# Patient Record
Sex: FEMALE | Race: OTHER | Employment: FULL TIME | URBAN - METROPOLITAN AREA
[De-identification: names, ages, dates, MRNs, and addresses within clinical notes are randomized per-mention and may not be internally consistent; named-entity substitution may affect disease eponyms.]

---

## 2017-01-23 RX ORDER — ESOMEPRAZOLE MAGNESIUM 40 MG/1
CAPSULE, DELAYED RELEASE ORAL
Qty: 30 CAPSULE | Refills: 2 | Status: SHIPPED | OUTPATIENT
Start: 2017-01-23

## 2017-11-30 DIAGNOSIS — I10 HTN (HYPERTENSION), BENIGN: ICD-10-CM

## 2017-11-30 RX ORDER — LISINOPRIL 20 MG/1
TABLET ORAL
Qty: 30 TABLET | Refills: 0 | Status: SHIPPED | OUTPATIENT
Start: 2017-11-30 | End: 2018-03-01 | Stop reason: SDUPTHER

## 2018-03-01 ENCOUNTER — TELEPHONE (OUTPATIENT)
Dept: INTERNAL MEDICINE CLINIC | Age: 58
End: 2018-03-01

## 2018-03-01 DIAGNOSIS — I10 HTN (HYPERTENSION), BENIGN: ICD-10-CM

## 2018-03-01 RX ORDER — LISINOPRIL 20 MG/1
20 TABLET ORAL DAILY
Qty: 30 TABLET | Refills: 0 | Status: SHIPPED | OUTPATIENT
Start: 2018-03-01 | End: 2018-03-12 | Stop reason: SDUPTHER

## 2018-03-12 ENCOUNTER — OFFICE VISIT (OUTPATIENT)
Dept: INTERNAL MEDICINE CLINIC | Age: 58
End: 2018-03-12
Payer: COMMERCIAL

## 2018-03-12 VITALS
HEIGHT: 62 IN | TEMPERATURE: 98.9 F | WEIGHT: 184.6 LBS | DIASTOLIC BLOOD PRESSURE: 80 MMHG | RESPIRATION RATE: 16 BRPM | HEART RATE: 76 BPM | SYSTOLIC BLOOD PRESSURE: 138 MMHG | OXYGEN SATURATION: 99 % | BODY MASS INDEX: 33.97 KG/M2

## 2018-03-12 DIAGNOSIS — I10 HTN (HYPERTENSION), BENIGN: Primary | ICD-10-CM

## 2018-03-12 DIAGNOSIS — L21.9 SEBORRHEIC DERMATITIS: ICD-10-CM

## 2018-03-12 DIAGNOSIS — Z12.11 SCREENING FOR COLON CANCER: ICD-10-CM

## 2018-03-12 DIAGNOSIS — M32.19 OTHER SYSTEMIC LUPUS ERYTHEMATOSUS WITH OTHER ORGAN INVOLVEMENT (HCC): ICD-10-CM

## 2018-03-12 DIAGNOSIS — Z12.31 VISIT FOR SCREENING MAMMOGRAM: ICD-10-CM

## 2018-03-12 PROCEDURE — 99214 OFFICE O/P EST MOD 30 MIN: CPT | Performed by: FAMILY MEDICINE

## 2018-03-12 RX ORDER — GABAPENTIN 300 MG/1
2 CAPSULE ORAL EVERY EVENING
COMMUNITY
Start: 2018-02-02 | End: 2018-12-02

## 2018-03-12 RX ORDER — LISINOPRIL 20 MG/1
20 TABLET ORAL DAILY
Qty: 90 TABLET | Refills: 3 | Status: SHIPPED | OUTPATIENT
Start: 2018-03-12

## 2018-03-12 RX ORDER — IBUPROFEN 800 MG/1
800 TABLET ORAL EVERY 8 HOURS PRN
COMMUNITY
Start: 2018-02-09

## 2018-03-12 ASSESSMENT — PATIENT HEALTH QUESTIONNAIRE - PHQ9
SUM OF ALL RESPONSES TO PHQ QUESTIONS 1-9: 1
SUM OF ALL RESPONSES TO PHQ9 QUESTIONS 1 & 2: 1
2. FEELING DOWN, DEPRESSED OR HOPELESS: 1
1. LITTLE INTEREST OR PLEASURE IN DOING THINGS: 0

## 2018-03-12 NOTE — PATIENT INSTRUCTIONS
Patient Education        Seborrheic Dermatitis: Care Instructions  Your Care Instructions  Seborrheic dermatitis (say \"vpy-tcu-KHW-ick ahm-spz-AE-tus\") is a skin problem that causes a reddish rash with greasy, flaky, yellow skin patches. The rash may appear on many parts of the body. It may be on the scalp, face (especially the eyebrow area and between the nose and mouth), ears, breasts, underarms, and genital area. The flaky skin on the scalp is called dandruff. This rash is often a long-term (chronic) condition. It may last for years. But the symptoms may come and go. Symptoms can be treated with special creams, shampoos, or other skin care. The cause of seborrheic dermatitis is not fully understood. It may occur when skin glands make too much oil. It may get worse in cold weather or with stress. A type of skin fungus, or yeast, may also be linked with this condition. Follow-up care is a key part of your treatment and safety. Be sure to make and go to all appointments, and call your doctor if you are having problems. It's also a good idea to know your test results and keep a list of the medicines you take. How can you care for yourself at home? · If your doctor prescribes a steroid cream, dandruff shampoo, or antifungal cream or medicine, use it as directed. If your doctor prescribes other medicine, take it as directed. · Use a dandruff shampoo if seborrheic dermatitis affects your scalp. This includes Head & Shoulders, Sebulex, and Selsun Blue. You may need to try a few kinds of shampoo to find the one that works best for you. · To help with itching:  ¨ Use hydrocortisone cream. Follow the directions on the label. ¨ Use cold, wet cloths. ¨ Take an over-the-counter antihistamine, such as diphenhydramine (Benadryl) or loratadine (Claritin). Read and follow all instructions on the label. When should you call for help?   Call your doctor now or seek immediate medical care if:  ? · You have signs of

## 2018-03-12 NOTE — PROGRESS NOTES
Patient: Ryan Santoyo    YOB: 1960    Date: 3/12/18    Chief Complaint   Patient presents with    Hypertension     6 month follow up. She states she feels like someting is crawling sensation in her right ear.  Health Maintenance     She declines flu, tdap vaccine. Patient Active Problem List    Diagnosis Date Noted    Seborrheic dermatitis 03/12/2018    Fibromyalgia     Raynaud phenomenon     Reflux esophagitis     HTN (hypertension), benign     Systemic lupus erythematosus (HCC)        Allergies   Allergen Reactions    Sulfa Antibiotics Hives    Sulfa Antibiotics        Vitals:    03/12/18 1740   BP: 138/80   Site: Right Arm   Position: Sitting   Cuff Size: Large Adult   Pulse: 76   Resp: 16   Temp: 98.9 °F (37.2 °C)   TempSrc: Oral   SpO2: 99%   Weight: 184 lb 9.6 oz (83.7 kg)   Height: 5' 2\" (1.575 m)      Body mass index is 33.76 kg/m². HPI    She is here to follow-up on her blood pressure and her reflux. She now has fibromyalgia and of course is been dealing with her systemic lupus. She's also been told she has plantar fasciitis as well as what sounds like a neuroma on her foot. She has no headache no cough no fever no chills no nausea no vomiting no problems with her bowels or bladder. We'll write her another mammogram ordered. She feels occasionally like there's something crawling in her ears but she was told she had a skin irritation of her ears in the past but she's just fearful that there is something in her ear canal.  No trouble with hearing. Review of Systems    Constitutional: Negative for fatigue, fever and positive for sweats. HEENT: Negative for eye discharge and vision loss. Negative for ear drainage, hearing loss and nasal drainage. Respiratory: Negative for cough, dyspnea and wheezing. Cardiovascular:  Negative for chest pain, claudication and irregular heartbeat/palpitations.   Gastrointestinal: Negative for abdominal pain, nausea, constipation [facility] DIGITAL SCREEN W OR WO CAD BILATERAL               Plan:  Current Outpatient Prescriptions   Medication Sig Dispense Refill    gabapentin (NEURONTIN) 300 MG capsule Take 2 capsules by mouth every evening.  ibuprofen (ADVIL;MOTRIN) 800 MG tablet Take 800 mg by mouth every 8 hours as needed      lisinopril (PRINIVIL;ZESTRIL) 20 MG tablet Take 1 tablet by mouth daily 90 tablet 3    esomeprazole (NEXIUM) 40 MG delayed release capsule take 1 capsule by mouth once daily 30 capsule 2    methotrexate 2.5 MG tablet       hydroxychloroquine (PLAQUENIL) 200 MG tablet Take 200 mg by mouth 2 times daily.  folic acid (FOLVITE) 1 MG tablet Take 1 mg by mouth daily.  Cholecalciferol (VITAMIN D3) 1000 UNITS TABS Take 4,000 Units by mouth daily        No current facility-administered medications for this visit. Orders Placed This Encounter   Procedures    [facility] DIGITAL SCREEN W OR WO CAD BILATERAL     Standing Status:   Future     Standing Expiration Date:   5/12/2019    Comprehensive Metabolic Panel     Standing Status:   Future     Standing Expiration Date:   3/12/2019    POCT Fecal Immunochemical Test (FIT)     Standing Status:   Future     Standing Expiration Date:   3/12/2019       Orders Placed This Encounter   Medications    lisinopril (PRINIVIL;ZESTRIL) 20 MG tablet     Sig: Take 1 tablet by mouth daily     Dispense:  90 tablet     Refill:  3             Return in about 6 months (around 9/12/2018).     Dr. Kye Liagn      3/12/18  6:04 PM

## 2018-03-27 ENCOUNTER — NURSE ONLY (OUTPATIENT)
Dept: INTERNAL MEDICINE CLINIC | Age: 58
End: 2018-03-27
Payer: COMMERCIAL

## 2018-03-27 DIAGNOSIS — I10 HTN (HYPERTENSION), BENIGN: Primary | ICD-10-CM

## 2018-03-27 DIAGNOSIS — Z12.11 SCREENING FOR COLON CANCER: ICD-10-CM

## 2018-03-27 PROCEDURE — 82274 ASSAY TEST FOR BLOOD FECAL: CPT | Performed by: FAMILY MEDICINE

## 2018-03-27 PROCEDURE — 36415 COLL VENOUS BLD VENIPUNCTURE: CPT | Performed by: FAMILY MEDICINE

## 2018-03-28 LAB
ALBUMIN SERPL-MCNC: 4.3 G/DL (ref 3.9–4.9)
ALP BLD-CCNC: 169 U/L (ref 40–130)
ALT SERPL-CCNC: 60 U/L (ref 0–33)
ANION GAP SERPL CALCULATED.3IONS-SCNC: 14 MEQ/L (ref 7–13)
AST SERPL-CCNC: 76 U/L (ref 0–35)
BILIRUB SERPL-MCNC: 0.4 MG/DL (ref 0–1.2)
BUN BLDV-MCNC: 9 MG/DL (ref 6–20)
CALCIUM SERPL-MCNC: 8.9 MG/DL (ref 8.6–10.2)
CHLORIDE BLD-SCNC: 101 MEQ/L (ref 98–107)
CO2: 27 MEQ/L (ref 22–29)
CREAT SERPL-MCNC: 0.69 MG/DL (ref 0.5–0.9)
GFR AFRICAN AMERICAN: >60
GFR NON-AFRICAN AMERICAN: >60
GLOBULIN: 2.6 G/DL (ref 2.3–3.5)
GLUCOSE BLD-MCNC: 76 MG/DL (ref 74–109)
POTASSIUM SERPL-SCNC: 4 MEQ/L (ref 3.5–5.1)
SODIUM BLD-SCNC: 142 MEQ/L (ref 132–144)
TOTAL PROTEIN: 6.9 G/DL (ref 6.4–8.1)

## 2018-04-03 LAB
CONTROL: NORMAL
HEMOCCULT STL QL: NORMAL

## 2018-04-14 ENCOUNTER — HOSPITAL ENCOUNTER (OUTPATIENT)
Dept: WOMENS IMAGING | Age: 58
Discharge: HOME | End: 2018-04-16
Payer: COMMERCIAL

## 2018-04-14 DIAGNOSIS — Z12.31 VISIT FOR SCREENING MAMMOGRAM: ICD-10-CM

## 2018-04-14 PROCEDURE — 77067 SCR MAMMO BI INCL CAD: CPT

## 2018-12-20 ENCOUNTER — TELEPHONE (OUTPATIENT)
Dept: INTERNAL MEDICINE CLINIC | Age: 58
End: 2018-12-20

## 2018-12-20 NOTE — TELEPHONE ENCOUNTER
[address] called back and states the lab work was ordered by Dr. Marvel Dos Santos, and patient just wanted you to know about them.

## 2020-04-21 ENCOUNTER — TELEPHONE (OUTPATIENT)
Dept: FAMILY MEDICINE CLINIC | Age: 60
End: 2020-04-21

## 2020-04-28 ENCOUNTER — TELEPHONE (OUTPATIENT)
Dept: FAMILY MEDICINE CLINIC | Age: 60
End: 2020-04-28

## 2023-04-07 ENCOUNTER — OFFICE VISIT (OUTPATIENT)
Dept: PRIMARY CARE | Facility: CLINIC | Age: 63
End: 2023-04-07
Payer: COMMERCIAL

## 2023-04-07 VITALS
BODY MASS INDEX: 36.78 KG/M2 | SYSTOLIC BLOOD PRESSURE: 122 MMHG | TEMPERATURE: 97.9 F | HEIGHT: 61 IN | RESPIRATION RATE: 16 BRPM | DIASTOLIC BLOOD PRESSURE: 72 MMHG | WEIGHT: 194.8 LBS

## 2023-04-07 DIAGNOSIS — K21.9 GASTROESOPHAGEAL REFLUX DISEASE WITHOUT ESOPHAGITIS: Primary | ICD-10-CM

## 2023-04-07 DIAGNOSIS — L91.8 SKIN TAG: ICD-10-CM

## 2023-04-07 DIAGNOSIS — R73.03 PREDIABETES: ICD-10-CM

## 2023-04-07 PROBLEM — H25.13 AGE-RELATED NUCLEAR CATARACT OF BOTH EYES: Status: ACTIVE | Noted: 2023-04-07

## 2023-04-07 PROBLEM — R51.9 HEADACHE: Status: ACTIVE | Noted: 2023-04-07

## 2023-04-07 PROBLEM — B36.0 TINEA VERSICOLOR: Status: ACTIVE | Noted: 2023-04-07

## 2023-04-07 PROBLEM — I10 HYPERTENSION: Status: ACTIVE | Noted: 2023-04-07

## 2023-04-07 PROBLEM — M79.673 FOOT PAIN: Status: ACTIVE | Noted: 2023-04-07

## 2023-04-07 PROBLEM — R10.11 ABDOMINAL PAIN, ACUTE, RIGHT UPPER QUADRANT: Status: ACTIVE | Noted: 2023-04-07

## 2023-04-07 PROBLEM — D69.6 THROMBOCYTOPENIA (CMS-HCC): Status: ACTIVE | Noted: 2023-04-07

## 2023-04-07 PROBLEM — T16.2XXA FOREIGN BODY OF LEFT EAR: Status: ACTIVE | Noted: 2023-04-07

## 2023-04-07 PROBLEM — H18.413 ARCUS SENILIS, BILATERAL: Status: ACTIVE | Noted: 2023-04-07

## 2023-04-07 PROBLEM — E66.812 CLASS 2 SEVERE OBESITY DUE TO EXCESS CALORIES WITH SERIOUS COMORBIDITY AND BODY MASS INDEX (BMI) OF 37.0 TO 37.9 IN ADULT: Status: ACTIVE | Noted: 2023-04-07

## 2023-04-07 PROBLEM — E55.9 VITAMIN D DEFICIENCY: Status: RESOLVED | Noted: 2023-04-07 | Resolved: 2023-04-07

## 2023-04-07 PROBLEM — E66.01 CLASS 2 SEVERE OBESITY DUE TO EXCESS CALORIES WITH SERIOUS COMORBIDITY AND BODY MASS INDEX (BMI) OF 37.0 TO 37.9 IN ADULT (MULTI): Status: ACTIVE | Noted: 2023-04-07

## 2023-04-07 PROBLEM — M94.0 COSTOCHONDRITIS: Status: ACTIVE | Noted: 2023-04-07

## 2023-04-07 PROBLEM — H25.042 POSTERIOR SUBCAPSULAR AGE-RELATED CATARACT OF LEFT EYE: Status: ACTIVE | Noted: 2023-04-07

## 2023-04-07 PROBLEM — M25.569 KNEE PAIN: Status: ACTIVE | Noted: 2023-04-07

## 2023-04-07 PROBLEM — R74.8 ALKALINE PHOSPHATASE ELEVATION: Status: ACTIVE | Noted: 2023-04-07

## 2023-04-07 PROBLEM — I83.90 VARICOSE VEINS OF LOWER EXTREMITY: Status: ACTIVE | Noted: 2023-04-07

## 2023-04-07 PROBLEM — K13.0 ANGULAR CHEILOSIS: Status: ACTIVE | Noted: 2023-04-07

## 2023-04-07 PROBLEM — M79.7 FIBROMYALGIA: Status: ACTIVE | Noted: 2023-04-07

## 2023-04-07 PROBLEM — T78.40XA ALLERGIC REACTION: Status: ACTIVE | Noted: 2023-04-07

## 2023-04-07 PROBLEM — S83.90XA KNEE SPRAIN: Status: ACTIVE | Noted: 2023-04-07

## 2023-04-07 PROBLEM — I83.90 VARICOSE VEIN OF LEG: Status: ACTIVE | Noted: 2023-04-07

## 2023-04-07 PROBLEM — M32.9 SLE (SYSTEMIC LUPUS ERYTHEMATOSUS) (MULTI): Status: ACTIVE | Noted: 2023-04-07

## 2023-04-07 PROBLEM — K13.79 SORE IN MOUTH: Status: ACTIVE | Noted: 2023-04-07

## 2023-04-07 PROBLEM — M23.90 KNEE INTERNAL DERANGEMENT: Status: ACTIVE | Noted: 2023-04-07

## 2023-04-07 PROBLEM — R74.01 TRANSAMINITIS: Status: ACTIVE | Noted: 2023-04-07

## 2023-04-07 PROBLEM — S93.609A FOOT SPRAIN: Status: ACTIVE | Noted: 2023-04-07

## 2023-04-07 PROCEDURE — 3078F DIAST BP <80 MM HG: CPT | Performed by: PHYSICIAN ASSISTANT

## 2023-04-07 PROCEDURE — 1036F TOBACCO NON-USER: CPT | Performed by: PHYSICIAN ASSISTANT

## 2023-04-07 PROCEDURE — 3074F SYST BP LT 130 MM HG: CPT | Performed by: PHYSICIAN ASSISTANT

## 2023-04-07 PROCEDURE — 99213 OFFICE O/P EST LOW 20 MIN: CPT | Performed by: PHYSICIAN ASSISTANT

## 2023-04-07 RX ORDER — ESOMEPRAZOLE MAGNESIUM 40 MG/1
40 CAPSULE, DELAYED RELEASE ORAL DAILY
Qty: 90 CAPSULE | Refills: 1 | Status: SHIPPED | OUTPATIENT
Start: 2023-04-07 | End: 2023-10-09 | Stop reason: SDUPTHER

## 2023-04-07 RX ORDER — HYDROXYCHLOROQUINE SULFATE 200 MG/1
200 TABLET ORAL 2 TIMES DAILY
COMMUNITY

## 2023-04-07 RX ORDER — METFORMIN HYDROCHLORIDE 500 MG/1
500 TABLET ORAL DAILY
COMMUNITY
Start: 2021-09-10 | End: 2023-05-01 | Stop reason: SDUPTHER

## 2023-04-07 RX ORDER — CHOLECALCIFEROL (VITAMIN D3) 1250 MCG
50000 TABLET ORAL
COMMUNITY
Start: 2021-09-10 | End: 2023-10-09 | Stop reason: ALTCHOICE

## 2023-04-07 RX ORDER — ESOMEPRAZOLE MAGNESIUM 40 MG/1
40 CAPSULE, DELAYED RELEASE ORAL DAILY
COMMUNITY
End: 2023-04-07 | Stop reason: SDUPTHER

## 2023-04-07 RX ORDER — AZATHIOPRINE 50 MG/1
25 TABLET ORAL DAILY
COMMUNITY
End: 2023-10-09 | Stop reason: ALTCHOICE

## 2023-04-07 RX ORDER — DULOXETIN HYDROCHLORIDE 60 MG/1
1 CAPSULE, DELAYED RELEASE ORAL DAILY
COMMUNITY
Start: 2022-10-20 | End: 2023-10-09 | Stop reason: SDUPTHER

## 2023-04-07 RX ORDER — PREGABALIN 150 MG/1
150 CAPSULE ORAL 3 TIMES DAILY PRN
COMMUNITY

## 2023-04-07 RX ORDER — TRIAMCINOLONE ACETONIDE 5 MG/G
CREAM TOPICAL
COMMUNITY
Start: 2022-10-20

## 2023-04-07 RX ORDER — LISINOPRIL 10 MG/1
10 TABLET ORAL DAILY
COMMUNITY
End: 2023-10-09 | Stop reason: SDUPTHER

## 2023-04-07 RX ORDER — BELIMUMAB 200 MG/ML
1 SOLUTION SUBCUTANEOUS
COMMUNITY

## 2023-04-07 NOTE — PROGRESS NOTES
"Subjective   Patient ID: Daylin Tyler is a 62 y.o. female who presents for Skin Tag (Patient would like to have skin tags removed from both eyes. She has one on her top right eyelid and another bottom left eye. ) and Med Refill.    HPI   Right upper eyelid skin lesion   Left lower eyelid skin lesion     Review of Systems   Skin:         Skin lesions right upper eyelid, left lower eyelid        Objective   /72 (BP Location: Right arm, Patient Position: Sitting)   Temp 36.6 °C (97.9 °F)   Resp 16   Ht 1.549 m (5' 1\")   Wt 88.4 kg (194 lb 12.8 oz)   BMI 36.81 kg/m²     Physical Exam  Skin:     Findings: Lesion (right upper eyelid skin lesion. Left lower eyelid skin tag) present.         PROCEDURE   - snip leftlower eyelid  - cryo right upper eyelid   Patient ID: Daylin Tyler is a 62 y.o. female.    Destruction of lesion    Date/Time: 4/9/2023 9:50 PM    Performed by: Regina Romo PA-C  Authorized by: Regina Romo PA-C    Number of Lesions: 2  Lesion 1:     Body area: head/neck    Head/neck location: right upper eyelid.    Malignancy: benign lesion      Destruction method: cryotherapy    Lesion 2:     Body area: head/neck    Head/neck location: left lower eyelid.    Malignancy: benign lesion      Destruction method: scissors used for extraction        Assessment/Plan   Problem List Items Addressed This Visit          Digestive    GERD (gastroesophageal reflux disease) - Primary    Relevant Medications    esomeprazole (NexIUM) 40 mg DR capsule       Endocrine/Metabolic    Prediabetes    Relevant Orders    Lipid Panel    CBC    Hemoglobin A1C          "

## 2023-04-22 ENCOUNTER — LAB (OUTPATIENT)
Dept: LAB | Facility: LAB | Age: 63
End: 2023-04-22
Payer: COMMERCIAL

## 2023-04-22 DIAGNOSIS — R73.03 PREDIABETES: ICD-10-CM

## 2023-04-22 LAB
CHOLESTEROL (MG/DL) IN SER/PLAS: 147 MG/DL (ref 0–199)
CHOLESTEROL IN HDL (MG/DL) IN SER/PLAS: 65.1 MG/DL
CHOLESTEROL/HDL RATIO: 2.3
ERYTHROCYTE DISTRIBUTION WIDTH (RATIO) BY AUTOMATED COUNT: 14 % (ref 11.5–14.5)
ERYTHROCYTE MEAN CORPUSCULAR HEMOGLOBIN CONCENTRATION (G/DL) BY AUTOMATED: 31.5 G/DL (ref 32–36)
ERYTHROCYTE MEAN CORPUSCULAR VOLUME (FL) BY AUTOMATED COUNT: 89 FL (ref 80–100)
ERYTHROCYTES (10*6/UL) IN BLOOD BY AUTOMATED COUNT: 4.26 X10E12/L (ref 4–5.2)
ESTIMATED AVERAGE GLUCOSE FOR HBA1C: 143 MG/DL
HEMATOCRIT (%) IN BLOOD BY AUTOMATED COUNT: 37.8 % (ref 36–46)
HEMOGLOBIN (G/DL) IN BLOOD: 11.9 G/DL (ref 12–16)
HEMOGLOBIN A1C/HEMOGLOBIN TOTAL IN BLOOD: 6.6 %
LDL: 61 MG/DL (ref 0–99)
LEUKOCYTES (10*3/UL) IN BLOOD BY AUTOMATED COUNT: 4.3 X10E9/L (ref 4.4–11.3)
PLATELETS (10*3/UL) IN BLOOD AUTOMATED COUNT: 85 X10E9/L (ref 150–450)
TRIGLYCERIDE (MG/DL) IN SER/PLAS: 103 MG/DL (ref 0–149)
VLDL: 21 MG/DL (ref 0–40)

## 2023-04-22 PROCEDURE — 85027 COMPLETE CBC AUTOMATED: CPT

## 2023-04-22 PROCEDURE — 80061 LIPID PANEL: CPT

## 2023-04-22 PROCEDURE — 83036 HEMOGLOBIN GLYCOSYLATED A1C: CPT

## 2023-04-22 PROCEDURE — 36415 COLL VENOUS BLD VENIPUNCTURE: CPT

## 2023-04-24 DIAGNOSIS — E11.9 TYPE 2 DIABETES MELLITUS WITHOUT COMPLICATION, WITHOUT LONG-TERM CURRENT USE OF INSULIN (MULTI): Primary | ICD-10-CM

## 2023-04-24 PROBLEM — Z00.00 ANNUAL PHYSICAL EXAM: Status: ACTIVE | Noted: 2023-04-24

## 2023-04-25 ENCOUNTER — TELEPHONE (OUTPATIENT)
Dept: PRIMARY CARE | Facility: CLINIC | Age: 63
End: 2023-04-25
Payer: COMMERCIAL

## 2023-04-26 ENCOUNTER — TELEPHONE (OUTPATIENT)
Dept: PRIMARY CARE | Facility: CLINIC | Age: 63
End: 2023-04-26
Payer: COMMERCIAL

## 2023-05-01 DIAGNOSIS — E11.9 TYPE 2 DIABETES MELLITUS WITHOUT COMPLICATION, WITHOUT LONG-TERM CURRENT USE OF INSULIN (MULTI): Primary | ICD-10-CM

## 2023-05-01 RX ORDER — METFORMIN HYDROCHLORIDE 500 MG/1
500 TABLET ORAL
Qty: 90 TABLET | Refills: 3 | Status: SHIPPED | OUTPATIENT
Start: 2023-05-01 | End: 2024-04-12 | Stop reason: SINTOL

## 2023-05-23 DIAGNOSIS — D69.6 LOW PLATELET COUNT (CMS-HCC): Primary | ICD-10-CM

## 2023-07-14 ENCOUNTER — TRANSCRIBE ORDERS (OUTPATIENT)
Dept: ADMINISTRATIVE | Age: 63
End: 2023-07-14

## 2023-07-14 DIAGNOSIS — R94.5 NONSPECIFIC ABNORMAL RESULTS OF LIVER FUNCTION STUDY: Primary | ICD-10-CM

## 2023-07-20 ENCOUNTER — HOSPITAL ENCOUNTER (OUTPATIENT)
Dept: ULTRASOUND IMAGING | Age: 63
Discharge: HOME | End: 2023-07-22
Attending: INTERNAL MEDICINE
Payer: COMMERCIAL

## 2023-07-20 DIAGNOSIS — R94.5 NONSPECIFIC ABNORMAL RESULTS OF LIVER FUNCTION STUDY: ICD-10-CM

## 2023-07-20 PROCEDURE — 76705 ECHO EXAM OF ABDOMEN: CPT

## 2023-10-09 ENCOUNTER — LAB (OUTPATIENT)
Dept: LAB | Facility: LAB | Age: 63
End: 2023-10-09
Payer: COMMERCIAL

## 2023-10-09 ENCOUNTER — OFFICE VISIT (OUTPATIENT)
Dept: PRIMARY CARE | Facility: CLINIC | Age: 63
End: 2023-10-09
Payer: COMMERCIAL

## 2023-10-09 ENCOUNTER — TELEPHONE (OUTPATIENT)
Dept: PRIMARY CARE | Facility: CLINIC | Age: 63
End: 2023-10-09

## 2023-10-09 VITALS
OXYGEN SATURATION: 98 % | HEART RATE: 72 BPM | SYSTOLIC BLOOD PRESSURE: 130 MMHG | RESPIRATION RATE: 18 BRPM | HEIGHT: 61 IN | DIASTOLIC BLOOD PRESSURE: 94 MMHG | BODY MASS INDEX: 35.87 KG/M2 | TEMPERATURE: 98.4 F | WEIGHT: 190 LBS

## 2023-10-09 DIAGNOSIS — R21 RASH OF FACE: ICD-10-CM

## 2023-10-09 DIAGNOSIS — L70.0 ACNE VULGARIS: ICD-10-CM

## 2023-10-09 DIAGNOSIS — Z12.31 ENCOUNTER FOR SCREENING MAMMOGRAM FOR BREAST CANCER: ICD-10-CM

## 2023-10-09 DIAGNOSIS — Z23 FLU VACCINE NEED: ICD-10-CM

## 2023-10-09 DIAGNOSIS — K21.9 GASTROESOPHAGEAL REFLUX DISEASE WITHOUT ESOPHAGITIS: Primary | ICD-10-CM

## 2023-10-09 DIAGNOSIS — E11.9 TYPE 2 DIABETES MELLITUS WITHOUT COMPLICATION, WITHOUT LONG-TERM CURRENT USE OF INSULIN (MULTI): ICD-10-CM

## 2023-10-09 DIAGNOSIS — Z71.89 CARDIAC RISK COUNSELING: ICD-10-CM

## 2023-10-09 DIAGNOSIS — Z00.00 ANNUAL PHYSICAL EXAM: ICD-10-CM

## 2023-10-09 DIAGNOSIS — F32.A DEPRESSION, UNSPECIFIED DEPRESSION TYPE: ICD-10-CM

## 2023-10-09 DIAGNOSIS — I10 PRIMARY HYPERTENSION: ICD-10-CM

## 2023-10-09 DIAGNOSIS — E66.01 CLASS 2 SEVERE OBESITY DUE TO EXCESS CALORIES WITH SERIOUS COMORBIDITY AND BODY MASS INDEX (BMI) OF 37.0 TO 37.9 IN ADULT (MULTI): ICD-10-CM

## 2023-10-09 DIAGNOSIS — J01.01 ACUTE RECURRENT MAXILLARY SINUSITIS: ICD-10-CM

## 2023-10-09 LAB
ALBUMIN SERPL BCP-MCNC: 3.8 G/DL (ref 3.4–5)
ALP SERPL-CCNC: 184 U/L (ref 33–136)
ALT SERPL W P-5'-P-CCNC: 30 U/L (ref 7–45)
ANION GAP SERPL CALC-SCNC: 11 MMOL/L (ref 10–20)
AST SERPL W P-5'-P-CCNC: 47 U/L (ref 9–39)
BILIRUB SERPL-MCNC: 1 MG/DL (ref 0–1.2)
BUN SERPL-MCNC: 10 MG/DL (ref 6–23)
CALCIUM SERPL-MCNC: 9 MG/DL (ref 8.6–10.3)
CHLORIDE SERPL-SCNC: 106 MMOL/L (ref 98–107)
CO2 SERPL-SCNC: 29 MMOL/L (ref 21–32)
CREAT SERPL-MCNC: 0.67 MG/DL (ref 0.5–1.05)
CREAT UR-MCNC: 103.8 MG/DL (ref 20–320)
ERYTHROCYTE [DISTWIDTH] IN BLOOD BY AUTOMATED COUNT: 14.3 % (ref 11.5–14.5)
GFR SERPL CREATININE-BSD FRML MDRD: >90 ML/MIN/1.73M*2
GLUCOSE SERPL-MCNC: 121 MG/DL (ref 74–99)
HCT VFR BLD AUTO: 38.8 % (ref 36–46)
HGB BLD-MCNC: 12 G/DL (ref 12–16)
MCH RBC QN AUTO: 26.7 PG (ref 26–34)
MCHC RBC AUTO-ENTMCNC: 30.9 G/DL (ref 32–36)
MCV RBC AUTO: 86 FL (ref 80–100)
MICROALBUMIN UR-MCNC: <7 MG/L
MICROALBUMIN/CREAT UR: NORMAL MG/G{CREAT}
NRBC BLD-RTO: 0 /100 WBCS (ref 0–0)
PLATELET # BLD AUTO: 93 X10*3/UL (ref 150–450)
PMV BLD AUTO: 11.6 FL (ref 7.5–11.5)
POTASSIUM SERPL-SCNC: 4 MMOL/L (ref 3.5–5.3)
PROT SERPL-MCNC: 6.8 G/DL (ref 6.4–8.2)
RBC # BLD AUTO: 4.5 X10*6/UL (ref 4–5.2)
SODIUM SERPL-SCNC: 142 MMOL/L (ref 136–145)
VIT B12 SERPL-MCNC: 1376 PG/ML (ref 211–911)
WBC # BLD AUTO: 4.5 X10*3/UL (ref 4.4–11.3)

## 2023-10-09 PROCEDURE — 90686 IIV4 VACC NO PRSV 0.5 ML IM: CPT | Performed by: PHYSICIAN ASSISTANT

## 2023-10-09 PROCEDURE — 4010F ACE/ARB THERAPY RXD/TAKEN: CPT | Performed by: PHYSICIAN ASSISTANT

## 2023-10-09 PROCEDURE — 90471 IMMUNIZATION ADMIN: CPT | Performed by: PHYSICIAN ASSISTANT

## 2023-10-09 PROCEDURE — 82570 ASSAY OF URINE CREATININE: CPT

## 2023-10-09 PROCEDURE — 82043 UR ALBUMIN QUANTITATIVE: CPT

## 2023-10-09 PROCEDURE — 85027 COMPLETE CBC AUTOMATED: CPT

## 2023-10-09 PROCEDURE — 36415 COLL VENOUS BLD VENIPUNCTURE: CPT

## 2023-10-09 PROCEDURE — 3044F HG A1C LEVEL LT 7.0%: CPT | Performed by: PHYSICIAN ASSISTANT

## 2023-10-09 PROCEDURE — 3080F DIAST BP >= 90 MM HG: CPT | Performed by: PHYSICIAN ASSISTANT

## 2023-10-09 PROCEDURE — 1036F TOBACCO NON-USER: CPT | Performed by: PHYSICIAN ASSISTANT

## 2023-10-09 PROCEDURE — 3075F SYST BP GE 130 - 139MM HG: CPT | Performed by: PHYSICIAN ASSISTANT

## 2023-10-09 PROCEDURE — 99396 PREV VISIT EST AGE 40-64: CPT | Performed by: PHYSICIAN ASSISTANT

## 2023-10-09 PROCEDURE — 3008F BODY MASS INDEX DOCD: CPT | Performed by: PHYSICIAN ASSISTANT

## 2023-10-09 PROCEDURE — 83036 HEMOGLOBIN GLYCOSYLATED A1C: CPT

## 2023-10-09 PROCEDURE — 82607 VITAMIN B-12: CPT

## 2023-10-09 PROCEDURE — 80053 COMPREHEN METABOLIC PANEL: CPT

## 2023-10-09 RX ORDER — DULOXETIN HYDROCHLORIDE 60 MG/1
60 CAPSULE, DELAYED RELEASE ORAL DAILY
Qty: 90 CAPSULE | Refills: 1 | Status: SHIPPED | OUTPATIENT
Start: 2023-10-09

## 2023-10-09 RX ORDER — BUPROPION HYDROCHLORIDE 150 MG/1
150 TABLET ORAL EVERY MORNING
Qty: 90 TABLET | Refills: 1 | Status: SHIPPED | OUTPATIENT
Start: 2023-10-09 | End: 2024-04-12

## 2023-10-09 RX ORDER — AMOXICILLIN AND CLAVULANATE POTASSIUM 875; 125 MG/1; MG/1
875 TABLET, FILM COATED ORAL
Qty: 20 TABLET | Refills: 0 | Status: SHIPPED | OUTPATIENT
Start: 2023-10-09 | End: 2023-10-19

## 2023-10-09 RX ORDER — FOLIC ACID 1 MG/1
1 TABLET ORAL DAILY
Qty: 90 TABLET | Refills: 1 | Status: SHIPPED | OUTPATIENT
Start: 2023-10-09 | End: 2024-10-08

## 2023-10-09 RX ORDER — LISINOPRIL 20 MG/1
20 TABLET ORAL DAILY
Qty: 90 TABLET | Refills: 1 | Status: SHIPPED | OUTPATIENT
Start: 2023-10-09 | End: 2024-04-12 | Stop reason: SDUPTHER

## 2023-10-09 RX ORDER — CLINDAMYCIN AND BENZOYL PEROXIDE 10; 50 MG/G; MG/G
GEL TOPICAL 2 TIMES DAILY
Qty: 50 G | Refills: 1 | Status: SHIPPED | OUTPATIENT
Start: 2023-10-09

## 2023-10-09 RX ORDER — ESOMEPRAZOLE MAGNESIUM 40 MG/1
40 CAPSULE, DELAYED RELEASE ORAL DAILY
Qty: 90 CAPSULE | Refills: 1 | Status: SHIPPED | OUTPATIENT
Start: 2023-10-09

## 2023-10-09 ASSESSMENT — ENCOUNTER SYMPTOMS: DYSPHORIC MOOD: 1

## 2023-10-09 NOTE — PROGRESS NOTES
Subjective   Patient ID: Daylin Tyler is a 63 y.o. female who presents for Annual Exam (Pt here today for a check up and med refills. Pt having a sinus pressure, drainage down throat, right ear full past couple weeks off/on. Pt has been having blisters all over face off/on states they said it was due to Lupus but not sure. ).    HPI     Preventive:   - Lives at home in [place] with  (Will) and 2 Schitzus (Shadow and Monica) - home life is good    - Employment - [employer] in [place]   - Labs: DUE   - Colon CA: UTD   - Mamm: DUE   - PAP: DUE, DECLINED   - Flu: UTD   - Shingrix: DUE, DECLINED   - Td: UTD ? She thinks   - COVID-19 vax: UTD   - Diet: trying to clean up her diet - has lost 7lbs since last year  - Exercise:   - Sexual hx: not active x 8 years ( impotent)   - Tobacco: never   - Illicit drugs: never   - Alcohol: on weekends drinks beer with her mom - will drink 2 beer per sitting     Rash:  - gerts little blisters on her face   - her rheum says it could be part of her lupus  - they hurt   - has been using hydrocortiosne OTC - not helping   - they get red     Sinus pressure  - onset: a couple weeks ago   - Clinical coure: up and down   - Associated with right earache, b/l maxilary, post nasal drainage   - Not taking anything for it right now     HTN:   - BP today: 130/94 mmHg   - current med: lisinopril 10 mg   - Compliant: yes but ran out of her rx past couple days   - Monitoring BP at home:   - Caffeine intake: no   - NSAID use: no   - Sodium intake: yes   - Activity level: 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        Objective   BP  "(!) 130/94   Pulse 72   Temp 36.9 °C (98.4 °F)   Resp 18   Ht 1.549 m (5' 1\")   Wt 86.2 kg (190 lb)   SpO2 98%   BMI 35.90 kg/m²     Physical Exam  Constitutional:       General: She is not in acute distress.     Appearance: She is obese.   HENT:      Head: Normocephalic.      Right Ear: Tympanic membrane and ear canal normal.      Left Ear: Tympanic membrane and ear canal normal.      Nose: Nose normal.      Mouth/Throat:      Mouth: Mucous membranes are moist.      Pharynx: Oropharynx is clear.   Eyes:      Extraocular Movements: Extraocular movements intact.      Conjunctiva/sclera: Conjunctivae normal.      Pupils: Pupils are equal, round, and reactive to light.   Cardiovascular:      Rate and Rhythm: Normal rate and regular rhythm.      Pulses: Normal pulses.      Heart sounds: No murmur heard.  Pulmonary:      Effort: Pulmonary effort is normal.      Breath sounds: Normal breath sounds. No wheezing, rhonchi or rales.   Abdominal:      General: Abdomen is protuberant. Bowel sounds are normal. There is no distension.      Palpations: Abdomen is soft. There is no mass.      Tenderness: There is no abdominal tenderness. There is no guarding.   Musculoskeletal:         General: Normal range of motion.      Cervical back: Neck supple.   Lymphadenopathy:      Cervical: No cervical adenopathy.   Skin:     General: Skin is warm and dry.      Findings: No lesion or rash.   Neurological:      General: No focal deficit present.      Mental Status: She is alert.      Gait: Gait normal.   Psychiatric:         Mood and Affect: Mood normal.         Assessment/Plan   Problem List Items Addressed This Visit             ICD-10-CM    GERD (gastroesophageal reflux disease) - Primary K21.9    Relevant Medications    esomeprazole (NexIUM) 40 mg DR capsule    Hypertension I10     PRIMARY HYPERTENSION:   - BP in the office today is 130/94 mmHg   - Pt is currently taking lisinopril 10 mg   - Plan is to taper up to lisinopril 20 " mg   - As always, pt is encouraged to continue healthy lifestyle modifications e.g. reduce salt in the diet, reduce fatty foods, red meat, carbs, sweets, and increase fruits, vegetables, fish, and whole grains.  Reduce NSAID use. Increase physical activity, particularly cardiovascular exercise at least 30 minutes daily. Reduce alcohol intake to small amounts in moderation and avoid cigarette smoking.          Relevant Medications    lisinopril 20 mg tablet    folic acid (Folvite) 1 mg tablet    Type 2 diabetes mellitus without complication, without long-term current use of insulin (CMS/[facility]) E11.9    Relevant Orders    Albumin , Urine Random    Comprehensive Metabolic Panel    Hemoglobin A1C    CBC    Vitamin B12    Class 2 severe obesity due to excess calories with serious comorbidity and body mass index (BMI) of 37.0 to 37.9 in adult (CMS/[facility]) E66.01, Z68.37    Annual physical exam Z00.00     PREVENTIVE CARE SCREENING:  - Mood is good  - Home life is good, lives in [place] with    - 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 She thinks   - COVID-19: UTD   Women's health:  - PAP: DUE, DECLINED   - Mammogram: DUE, ordered today   Lifestyle Modification:  - Discussed DIET -   limit snacks, processed foods, sugary and greasy foods, fast foods. Increase healthy alternatives, whole grains, fruits vegetables.  - Encouraged to take daily multivitamin.    - Discussed EXERCISE -   Recommended weight training for bone health and 30 minutes of cardiovascular exercise 5-7 days a week.  - Encouraged pt to get yearly eye and dental exams           Other Visit Diagnoses         Codes    Depression, unspecified depression type     F32.A    Relevant Medications    DULoxetine (Cymbalta) 60 mg DR capsule    buPROPion XL (Wellbutrin XL) 150  mg 24 hr tablet    Flu vaccine need     Z23    Relevant Orders    Flu vaccine (IIV4) age 6 months and greater, preservative free (Completed)    Acne vulgaris     L70.0    Relevant Medications    clindamycin-benzoyl peroxide (Benzaclin) gel    Rash of face     R21    Relevant Medications    clindamycin-benzoyl peroxide (Benzaclin) gel    Other Relevant Orders    Referral to Dermatology    Encounter for screening mammogram for breast cancer     Z12.31    Relevant Orders    BI mammo bilateral screening tomosynthesis    Cardiac risk counseling     Z71.89    Relevant Orders    CT cardiac scoring wo IV contrast    Acute recurrent maxillary sinusitis     J01.01    Relevant Medications    amoxicillin-pot clavulanate (Augmentin) 875-125 mg tablet

## 2023-10-09 NOTE — ASSESSMENT & PLAN NOTE
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 She thinks   - COVID-19: UTD   Women's health:  - PAP: DUE, DECLINED   - Mammogram: DUE, ordered today   Lifestyle Modification:  - Discussed DIET -   limit snacks, processed foods, sugary and greasy foods, fast foods. Increase healthy alternatives, whole grains, fruits vegetables.  - Encouraged to take daily multivitamin.    - Discussed EXERCISE -   Recommended weight training for bone health and 30 minutes of cardiovascular exercise 5-7 days a week.  - Encouraged pt to get yearly eye and dental exams

## 2023-10-09 NOTE — ASSESSMENT & PLAN NOTE
PRIMARY HYPERTENSION:   - BP in the office today is 130/94 mmHg   - Pt is currently taking lisinopril 10 mg   - Plan is to taper up to lisinopril 20 mg   - As always, pt is encouraged to continue healthy lifestyle modifications e.g. reduce salt in the diet, reduce fatty foods, red meat, carbs, sweets, and increase fruits, vegetables, fish, and whole grains.  Reduce NSAID use. Increase physical activity, particularly cardiovascular exercise at least 30 minutes daily. Reduce alcohol intake to small amounts in moderation and avoid cigarette smoking.

## 2023-10-10 LAB
EST. AVERAGE GLUCOSE BLD GHB EST-MCNC: 146 MG/DL
HBA1C MFR BLD: 6.7 %

## 2023-10-21 ENCOUNTER — HOSPITAL ENCOUNTER (OUTPATIENT)
Dept: RADIOLOGY | Facility: HOSPITAL | Age: 63
Discharge: HOME | End: 2023-10-21
Payer: COMMERCIAL

## 2023-10-21 DIAGNOSIS — Z12.31 ENCOUNTER FOR SCREENING MAMMOGRAM FOR BREAST CANCER: ICD-10-CM

## 2023-10-21 PROCEDURE — 77067 SCR MAMMO BI INCL CAD: CPT

## 2023-10-21 PROCEDURE — 77067 SCR MAMMO BI INCL CAD: CPT | Performed by: RADIOLOGY

## 2023-10-21 PROCEDURE — 77063 BREAST TOMOSYNTHESIS BI: CPT | Performed by: RADIOLOGY

## 2023-10-30 ENCOUNTER — HOSPITAL ENCOUNTER (OUTPATIENT)
Dept: RADIOLOGY | Facility: HOSPITAL | Age: 63
Discharge: HOME | End: 2023-10-30

## 2023-10-30 DIAGNOSIS — Z71.89 CARDIAC RISK COUNSELING: ICD-10-CM

## 2023-10-30 PROCEDURE — 75571 CT HRT W/O DYE W/CA TEST: CPT

## 2024-02-27 ENCOUNTER — OFFICE VISIT (OUTPATIENT)
Dept: OTOLARYNGOLOGY | Facility: CLINIC | Age: 64
End: 2024-02-27
Payer: COMMERCIAL

## 2024-02-27 DIAGNOSIS — H69.90 DYSFUNCTION OF EUSTACHIAN TUBE, UNSPECIFIED LATERALITY: Primary | ICD-10-CM

## 2024-02-27 PROCEDURE — 99213 OFFICE O/P EST LOW 20 MIN: CPT | Performed by: PHYSICIAN ASSISTANT

## 2024-02-27 PROCEDURE — 3008F BODY MASS INDEX DOCD: CPT | Performed by: PHYSICIAN ASSISTANT

## 2024-02-27 PROCEDURE — 1036F TOBACCO NON-USER: CPT | Performed by: PHYSICIAN ASSISTANT

## 2024-02-27 PROCEDURE — 4010F ACE/ARB THERAPY RXD/TAKEN: CPT | Performed by: PHYSICIAN ASSISTANT

## 2024-02-27 NOTE — PROGRESS NOTES
Daylin Tyler is a 63 y.o. year old female patient with Foreign Body in Ear     Patient presents to the office today for assessment of ears.  The patient is here today complaining that he feels as though something is moving or falling in the ears.  She has occasional popping cracking phenomenon with swallow.  She denies change in hearing.  She denies tinnitus.  She is without other ENT related concerns at this time.      Review of Systems   All other systems reviewed and are negative.        Physical Exam: General appearance: No acute distress. Normal facies. Symmetric facial movement. No gross lesions of the face are noted.  The external ear structures appear normal. The ear canals patent and the tympanic membranes are intact without evidence of air-fluid levels, retraction, or congenital defects.  Anterior rhinoscopy notes essentially a midline nasal septum. Examination is noted for normal healthy mucosal membranes without any evidence of lesions, polyps, or exudate. The tongue is normally mobile. There are no lesions on the gingiva, buccal, or oral mucosa. There are no oral cavity masses.  The neck is negative for mass lymphadenopathy. The trachea and parotid are clear. The thyroid bed is grossly unremarkable. The salivary gland structures are grossly unremarkable      Assessment/Plan   1.  Eustachian tube dysfunction    Patient seen in the office today for assessment of ears with complaints of popping cracking phenomenon in the ears as well as a sensation of something moving.  Her physical examination today was unremarkable with no evidence of foreign body cerumen or other abnormality.  Certainly the patient could consider utilization of nasal steroids.  There is nothing more worrisome today.  I did express that she could also utilize heat in the preauricular region.  We will see her back as needed

## 2024-04-12 ENCOUNTER — OFFICE VISIT (OUTPATIENT)
Dept: PRIMARY CARE | Facility: CLINIC | Age: 64
End: 2024-04-12
Payer: COMMERCIAL

## 2024-04-12 VITALS
WEIGHT: 188 LBS | TEMPERATURE: 98 F | BODY MASS INDEX: 35.5 KG/M2 | HEIGHT: 61 IN | OXYGEN SATURATION: 100 % | SYSTOLIC BLOOD PRESSURE: 142 MMHG | RESPIRATION RATE: 18 BRPM | HEART RATE: 76 BPM | DIASTOLIC BLOOD PRESSURE: 68 MMHG

## 2024-04-12 DIAGNOSIS — E11.59 HYPERTENSION ASSOCIATED WITH TYPE 2 DIABETES MELLITUS (MULTI): ICD-10-CM

## 2024-04-12 DIAGNOSIS — I15.2 HYPERTENSION ASSOCIATED WITH TYPE 2 DIABETES MELLITUS (MULTI): ICD-10-CM

## 2024-04-12 DIAGNOSIS — I10 PRIMARY HYPERTENSION: ICD-10-CM

## 2024-04-12 DIAGNOSIS — E11.59 TYPE 2 DIABETES MELLITUS WITH OTHER CIRCULATORY COMPLICATION, WITHOUT LONG-TERM CURRENT USE OF INSULIN (MULTI): Primary | ICD-10-CM

## 2024-04-12 DIAGNOSIS — E11.9 TYPE 2 DIABETES MELLITUS WITHOUT COMPLICATION, WITHOUT LONG-TERM CURRENT USE OF INSULIN (MULTI): ICD-10-CM

## 2024-04-12 LAB — POC HEMOGLOBIN A1C: 7.2 % (ref 4.2–6.5)

## 2024-04-12 PROCEDURE — 4010F ACE/ARB THERAPY RXD/TAKEN: CPT | Performed by: PHYSICIAN ASSISTANT

## 2024-04-12 PROCEDURE — 3078F DIAST BP <80 MM HG: CPT | Performed by: PHYSICIAN ASSISTANT

## 2024-04-12 PROCEDURE — 3008F BODY MASS INDEX DOCD: CPT | Performed by: PHYSICIAN ASSISTANT

## 2024-04-12 PROCEDURE — 3077F SYST BP >= 140 MM HG: CPT | Performed by: PHYSICIAN ASSISTANT

## 2024-04-12 PROCEDURE — 83036 HEMOGLOBIN GLYCOSYLATED A1C: CPT | Performed by: PHYSICIAN ASSISTANT

## 2024-04-12 PROCEDURE — 99213 OFFICE O/P EST LOW 20 MIN: CPT | Performed by: PHYSICIAN ASSISTANT

## 2024-04-12 RX ORDER — CLOBETASOL PROPIONATE 0.46 MG/ML
SOLUTION TOPICAL
COMMUNITY
Start: 2024-03-13

## 2024-04-12 RX ORDER — METFORMIN HYDROCHLORIDE 500 MG/1
500 TABLET, EXTENDED RELEASE ORAL
Qty: 90 TABLET | Refills: 0 | Status: SHIPPED | OUTPATIENT
Start: 2024-04-12 | End: 2024-07-11

## 2024-04-12 RX ORDER — LISINOPRIL 30 MG/1
30 TABLET ORAL DAILY
Qty: 90 TABLET | Refills: 1 | Status: SHIPPED | OUTPATIENT
Start: 2024-04-12

## 2024-04-12 ASSESSMENT — ENCOUNTER SYMPTOMS
SHORTNESS OF BREATH: 0
PALPITATIONS: 0

## 2024-04-12 NOTE — PROGRESS NOTES
"Subjective   Patient ID: Daylin Tyler is a 63 y.o. female who presents for Follow-up (Pt here today for a 6 month follow up for DM2; hasn't been checking sugars but just got a new monitor yesterday. Pt states stopped Metformin about a month after taking due to stomach pain and abnormal stool. Pt also only took Rybelsus for two months last year and hasn't been on since. ).    HPI     T2DM follow up:   Stopped her medsfor dm   Too much upset stomach with metformin  Rybelsus was good but stopped it because ran out of fills     HTN follow up   - has been compliant with her lisinopril 20 mg   - BP still running high     Review of Systems   Respiratory:  Negative for shortness of breath.    Cardiovascular:  Negative for chest pain, palpitations and leg swelling.       Objective   /68   Pulse 76   Temp 36.7 °C (98 °F)   Resp 18   Ht 1.549 m (5' 1\")   Wt 85.3 kg (188 lb)   SpO2 100%   BMI 35.52 kg/m²     Physical Exam  Constitutional:       Appearance: Normal appearance.   Cardiovascular:      Rate and Rhythm: Normal rate and regular rhythm.      Pulses: Normal pulses.      Heart sounds: Normal heart sounds. No murmur heard.  Pulmonary:      Effort: Pulmonary effort is normal.      Breath sounds: Normal breath sounds.   Neurological:      Mental Status: She is alert.   Psychiatric:         Mood and Affect: Mood and affect normal.         Assessment/Plan     Problem List Items Addressed This Visit       Hypertension associated with type 2 diabetes mellitus (Multi)    Overview     - Current med: lisinopril 20 mg          Current Assessment & Plan     - BP in the office today is 142/68 mmHg   - Plan is to taper up to lisinopril 30 mg   - As always, pt is encouraged to continue healthy lifestyle modifications e.g. reduce salt in the diet, reduce fatty foods, red meat, carbs, sweets, and increase fruits, vegetables, fish, and whole grains.  Reduce NSAID use. Increase physical activity, particularly cardiovascular " exercise at least 30 minutes daily. Reduce alcohol intake to small amounts in moderation and avoid cigarette smoking.          Relevant Medications    lisinopril 30 mg tablet    Type 2 diabetes mellitus with circulatory disorder, without long-term current use of insulin (Multi) - Primary    Overview     - Most recent A1c is 7.2% up from 6.6% prior   - Current meds: none   - Historical meds : metformin (diarrhea), Rybelsus (ran out of rx)   - Most recent eGFR was 103 (2/8/24)  - Most recent UACr was negative (10/9/23)   - Pt is on ACE-I - lisinopril   - NOT on aspirin   - NOT on statin          Current Assessment & Plan     - Diabetes is worsening, pt admits she stopped all her meds   - Educated about the importance of good compliance with tx to prevent serious complications of T2DM and pt expressed understanding and agreed  - Too much diarrhea with metformin will try switching to extended release formulation, take with dinner  - Restart Rybelsus as well   - Follow up with me in 3 months            Relevant Medications    metFORMIN XR (Glucophage-XR) 500 mg 24 hr tablet    semaglutide (Rybelsus) 3 mg tablet

## 2024-04-12 NOTE — ASSESSMENT & PLAN NOTE
- BP in the office today is 142/68 mmHg   - Plan is to taper up to lisinopril 30 mg   - As always, pt is encouraged to continue healthy lifestyle modifications e.g. reduce salt in the diet, reduce fatty foods, red meat, carbs, sweets, and increase fruits, vegetables, fish, and whole grains.  Reduce NSAID use. Increase physical activity, particularly cardiovascular exercise at least 30 minutes daily. Reduce alcohol intake to small amounts in moderation and avoid cigarette smoking.

## 2024-04-12 NOTE — ASSESSMENT & PLAN NOTE
- Diabetes is worsening, pt admits she stopped all her meds   - Educated about the importance of good compliance with tx to prevent serious complications of T2DM and pt expressed understanding and agreed  - Too much diarrhea with metformin will try switching to extended release formulation, take with dinner  - Restart Rybelsus as well   - Follow up with me in 3 months

## 2024-04-23 DIAGNOSIS — E11.59 TYPE 2 DIABETES MELLITUS WITH OTHER CIRCULATORY COMPLICATION, WITHOUT LONG-TERM CURRENT USE OF INSULIN (MULTI): ICD-10-CM

## 2024-04-23 DIAGNOSIS — Z12.11 SCREENING FOR COLON CANCER: Primary | ICD-10-CM

## 2024-04-26 ENCOUNTER — TELEPHONE (OUTPATIENT)
Dept: PRIMARY CARE | Facility: CLINIC | Age: 64
End: 2024-04-26
Payer: COMMERCIAL

## 2024-05-11 LAB — NONINV COLON CA DNA+OCC BLD SCRN STL QL: NEGATIVE

## 2024-05-14 ENCOUNTER — TELEMEDICINE (OUTPATIENT)
Dept: PHARMACY | Facility: HOSPITAL | Age: 64
End: 2024-05-14
Payer: COMMERCIAL

## 2024-05-14 DIAGNOSIS — E11.9 TYPE 2 DIABETES MELLITUS WITHOUT COMPLICATION, WITHOUT LONG-TERM CURRENT USE OF INSULIN (MULTI): ICD-10-CM

## 2024-05-14 DIAGNOSIS — E11.59 TYPE 2 DIABETES MELLITUS WITH OTHER CIRCULATORY COMPLICATION, WITHOUT LONG-TERM CURRENT USE OF INSULIN (MULTI): ICD-10-CM

## 2024-05-14 NOTE — PROGRESS NOTES
Pharmacist Clinic: ARLTEH Osorio was referred to the Clinical Pharmacy Team for financial assistance with Rybelsus.    Referring Provider: Regina Romo PA-C  Problem List Items Addressed This Visit       Type 2 diabetes mellitus with circulatory disorder, without long-term current use of insulin (Multi)     HISTORY OF PRESENT ILLNESS  Spoke with patient today regarding diabetes management and financial assistance with [employer]. Patient reports taking metformin  mg daily, however has not been able to  Rybelsus due to $900/month copay. Patient does test blood glucose and reports fasting blood glucose readings between 180-200 mg/dL. Patient has been trying to decrease sweets.    MEDICATION ASSESSMENT    Allergies: Sulfamethoxazole     [id number] - [place], [place] - [address]  [address]  [facility] [address]  Phone: [phone] Fax: [phone]    LAB  Lab Results   Component Value Date    BILITOT 1.0 10/09/2023    CALCIUM 9.0 10/09/2023    CO2 29 10/09/2023     10/09/2023    CREATININE 0.67 10/09/2023    GLUCOSE 121 (H) 10/09/2023    ALKPHOS 184 (H) 10/09/2023    K 4.0 10/09/2023    PROT 6.8 10/09/2023     10/09/2023    AST 47 (H) 10/09/2023    ALT 30 10/09/2023    BUN 10 10/09/2023    ANIONGAP 11 10/09/2023    ALBUMIN 3.8 10/09/2023    LIPASE 35 03/13/2020    EGFR >90 10/09/2023     Lab Results   Component Value Date    TRIG 103 04/22/2023    CHOL 147 04/22/2023    HDL 65.1 04/22/2023     Lab Results   Component Value Date    HGBA1C 7.2 (A) 04/12/2024       DRUG INTERACTIONS  - No significant drug-drug interactions exist that require adjustment to therapy    CURRENT PHARMACOTHERAPY  - metformin  mg daily     PATIENT EDUCATION/DISCUSSION  - Reviewed blood glucose goals, and advised blood glucose readings are too high  - Hemoglobin A1c on 4/12/2024 was 7.2%, and has increased over the past year  - Advised will attempt  patient assistance program for  Rybelsus  - Patient verbally reports monthly or yearly income which is less than 400% federal poverty level  - Application for program has been submitted for the following medications: Rybelsus 3 mg  - Patient aware this process may take up to 2 weeks. Patient to bring federal 1040 form to [facility] later this week  - If approved medication must be filled through [facility] include 234 beds at the [facility] pharmacy and may be picked up or mailed to patient.   - Counseled patient on MOA, expectations, side effects, duration of therapy, contraindications, administration, and monitoring parameters  - Answered all patient questions and concerns; provided [facility] phone number if issues/questions arise    RECOMMENDATIONS/PLAN  1.  Start: Rybelsus 3 mg daily  2. Prescription sent to [facility] include 234 beds at the [facility] pharmacy  3. Continue: Continue all meds under the continuation of care with the referring provider and clinical pharmacy team.    Clinical Pharmacist follow up: 5/27/24 @ 4 pm  Type of Encounter: Virtual    Thank you,  Apoorva Koch, PharmD    Verbal consent to manage patient's drug therapy was obtained from patient. They were informed they may decline to participate or withdraw from participation in pharmacy services at any time.

## 2024-05-20 PROCEDURE — RXMED WILLOW AMBULATORY MEDICATION CHARGE

## 2024-05-24 ENCOUNTER — PHARMACY VISIT (OUTPATIENT)
Dept: PHARMACY | Facility: CLINIC | Age: 64
End: 2024-05-24
Payer: COMMERCIAL

## 2024-05-28 ENCOUNTER — TELEMEDICINE (OUTPATIENT)
Dept: PHARMACY | Facility: HOSPITAL | Age: 64
End: 2024-05-28
Payer: COMMERCIAL

## 2024-05-28 DIAGNOSIS — E11.59 TYPE 2 DIABETES MELLITUS WITH OTHER CIRCULATORY COMPLICATION, WITHOUT LONG-TERM CURRENT USE OF INSULIN (MULTI): ICD-10-CM

## 2024-05-28 DIAGNOSIS — E11.9 TYPE 2 DIABETES MELLITUS WITHOUT COMPLICATION, WITHOUT LONG-TERM CURRENT USE OF INSULIN (MULTI): ICD-10-CM

## 2024-05-28 NOTE — PROGRESS NOTES
Pharmacist Clinic: ARLETH Goins was referred to the Clinical Pharmacy Team for financial assistance with Rybelsus.    Referring Provider: Regina Room PA-C  Problem List Items Addressed This Visit       Type 2 diabetes mellitus with circulatory disorder, without long-term current use of insulin (Multi)     Other Visit Diagnoses       Type 2 diabetes mellitus without complication, without long-term current use of insulin (Multi)              HISTORY OF PRESENT ILLNESS  Spoke with patient today regarding diabetes management and financial assistance with Rybelsus. Patient reports taking metformin  mg daily, however has not been able to  Rybelsus due to $900/month copay.     Since last visit, patient was approved for  PAP for Rybelsus until 5/2025.     MEDICATION ASSESSMENT    Allergies: Sulfamethoxazole     [facility] - [place], [place] - [address]  [address]  [facility] [address]  Phone: [phone] Fax: [phone]    [facility]  [address], [address]  [facility] [address]  Phone: [phone] Fax: [phone]    LAB  Lab Results   Component Value Date    BILITOT 1.0 10/09/2023    CALCIUM 9.0 10/09/2023    CO2 29 10/09/2023     10/09/2023    CREATININE 0.67 10/09/2023    GLUCOSE 121 (H) 10/09/2023    ALKPHOS 184 (H) 10/09/2023    K 4.0 10/09/2023    PROT 6.8 10/09/2023     10/09/2023    AST 47 (H) 10/09/2023    ALT 30 10/09/2023    BUN 10 10/09/2023    ANIONGAP 11 10/09/2023    ALBUMIN 3.8 10/09/2023    LIPASE 35 03/13/2020    EGFR >90 10/09/2023     Lab Results   Component Value Date    TRIG 103 04/22/2023    CHOL 147 04/22/2023    HDL 65.1 04/22/2023     Lab Results   Component Value Date    HGBA1C 7.2 (A) 04/12/2024       DRUG INTERACTIONS  - No significant drug-drug interactions exist that require adjustment to therapy    CURRENT PHARMACOTHERAPY  - metformin  mg daily   - Rybelsus 3 mg daily    PATIENT  EDUCATION/DISCUSSION  - Will plan to start Rybelsus, however patient has not yet received medication in mail from  pharmacy  - [facility] to reach out to [facility] pharmacy for mail order  - Advised patient to call [facility] if has not received medication   - Counseled patient on MOA, expectations, side effects, duration of therapy, contraindications, administration, and monitoring parameters  - Answered all patient questions and concerns; provided [facility] phone number if issues/questions arise    RECOMMENDATIONS/PLAN  1.  Start: Rybelsus 3 mg daily  2. Continue: Continue all meds under the continuation of care with the referring provider and clinical pharmacy team.    Clinical Pharmacist follow up: 7/23/24 @ 12:30 pm (patient's lunch break)  Type of Encounter: Virtual    Thank you,  Apoorva Koch, PharmD    Verbal consent to manage patient's drug therapy was obtained from patient. They were informed they may decline to participate or withdraw from participation in pharmacy services at any time.

## 2024-05-30 ENCOUNTER — TELEPHONE (OUTPATIENT)
Dept: PHARMACY | Facility: HOSPITAL | Age: 64
End: 2024-05-30
Payer: COMMERCIAL

## 2024-05-30 NOTE — TELEPHONE ENCOUNTER
[facility] received voicemail from patient that Rybelsus prescription was delivered to her home. No further action needed.    Will plan to follow-up on 7/23/2024.    Thank you,  Apoorva Koch, PharmD

## 2024-06-29 DIAGNOSIS — I10 PRIMARY HYPERTENSION: ICD-10-CM

## 2024-07-02 RX ORDER — LISINOPRIL 20 MG/1
20 TABLET ORAL DAILY
Qty: 90 TABLET | Refills: 1 | Status: SHIPPED | OUTPATIENT
Start: 2024-07-02 | End: 2024-07-03 | Stop reason: WASHOUT

## 2024-07-03 ENCOUNTER — APPOINTMENT (OUTPATIENT)
Dept: PRIMARY CARE | Facility: CLINIC | Age: 64
End: 2024-07-03
Payer: COMMERCIAL

## 2024-07-03 ENCOUNTER — LAB (OUTPATIENT)
Dept: LAB | Facility: LAB | Age: 64
End: 2024-07-03
Payer: COMMERCIAL

## 2024-07-03 VITALS
RESPIRATION RATE: 18 BRPM | OXYGEN SATURATION: 100 % | BODY MASS INDEX: 35.12 KG/M2 | HEIGHT: 61 IN | TEMPERATURE: 98.9 F | DIASTOLIC BLOOD PRESSURE: 82 MMHG | HEART RATE: 85 BPM | WEIGHT: 186 LBS | SYSTOLIC BLOOD PRESSURE: 122 MMHG

## 2024-07-03 DIAGNOSIS — R74.01 TRANSAMINITIS: ICD-10-CM

## 2024-07-03 DIAGNOSIS — Z23 NEED FOR SHINGLES VACCINE: ICD-10-CM

## 2024-07-03 DIAGNOSIS — E11.59 TYPE 2 DIABETES MELLITUS WITH OTHER CIRCULATORY COMPLICATION, WITHOUT LONG-TERM CURRENT USE OF INSULIN (MULTI): ICD-10-CM

## 2024-07-03 DIAGNOSIS — Z00.00 ANNUAL PHYSICAL EXAM: Primary | ICD-10-CM

## 2024-07-03 DIAGNOSIS — D64.9 ANEMIA, UNSPECIFIED TYPE: ICD-10-CM

## 2024-07-03 DIAGNOSIS — Z00.00 ANNUAL PHYSICAL EXAM: ICD-10-CM

## 2024-07-03 DIAGNOSIS — D64.9 ANEMIA, UNSPECIFIED TYPE: Primary | ICD-10-CM

## 2024-07-03 LAB
ALBUMIN SERPL BCP-MCNC: 3.8 G/DL (ref 3.4–5)
ALP SERPL-CCNC: 193 U/L (ref 33–136)
ALT SERPL W P-5'-P-CCNC: 33 U/L (ref 7–45)
ANION GAP SERPL CALC-SCNC: 11 MMOL/L (ref 10–20)
AST SERPL W P-5'-P-CCNC: 62 U/L (ref 9–39)
BILIRUB SERPL-MCNC: 0.7 MG/DL (ref 0–1.2)
BUN SERPL-MCNC: 8 MG/DL (ref 6–23)
CALCIUM SERPL-MCNC: 8.9 MG/DL (ref 8.6–10.3)
CHLORIDE SERPL-SCNC: 106 MMOL/L (ref 98–107)
CHOLEST SERPL-MCNC: 155 MG/DL (ref 0–199)
CHOLESTEROL/HDL RATIO: 2.4
CO2 SERPL-SCNC: 28 MMOL/L (ref 21–32)
CREAT SERPL-MCNC: 0.49 MG/DL (ref 0.5–1.05)
CREAT UR-MCNC: 69.4 MG/DL (ref 20–320)
EGFRCR SERPLBLD CKD-EPI 2021: >90 ML/MIN/1.73M*2
ERYTHROCYTE [DISTWIDTH] IN BLOOD BY AUTOMATED COUNT: 16.8 % (ref 11.5–14.5)
FERRITIN SERPL-MCNC: 26 NG/ML (ref 8–150)
GLUCOSE SERPL-MCNC: 185 MG/DL (ref 74–99)
HAV IGM SER QL: NONREACTIVE
HBV CORE IGM SER QL: NONREACTIVE
HBV SURFACE AG SERPL QL IA: NONREACTIVE
HCT VFR BLD AUTO: 35.8 % (ref 36–46)
HCV AB SER QL: NONREACTIVE
HDLC SERPL-MCNC: 64.6 MG/DL
HGB BLD-MCNC: 10.8 G/DL (ref 12–16)
LDLC SERPL CALC-MCNC: 66 MG/DL
MCH RBC QN AUTO: 25.2 PG (ref 26–34)
MCHC RBC AUTO-ENTMCNC: 30.2 G/DL (ref 32–36)
MCV RBC AUTO: 84 FL (ref 80–100)
MICROALBUMIN UR-MCNC: <7 MG/L
MICROALBUMIN/CREAT UR: NORMAL MG/G{CREAT}
NON HDL CHOLESTEROL: 90 MG/DL (ref 0–149)
NRBC BLD-RTO: 0 /100 WBCS (ref 0–0)
PLATELET # BLD AUTO: 86 X10*3/UL (ref 150–450)
POC HEMOGLOBIN A1C: 6.9 % (ref 4.2–6.5)
POTASSIUM SERPL-SCNC: 4.8 MMOL/L (ref 3.5–5.3)
PROT SERPL-MCNC: 6.8 G/DL (ref 6.4–8.2)
RBC # BLD AUTO: 4.28 X10*6/UL (ref 4–5.2)
SODIUM SERPL-SCNC: 140 MMOL/L (ref 136–145)
TRIGL SERPL-MCNC: 122 MG/DL (ref 0–149)
VIT B12 SERPL-MCNC: 914 PG/ML (ref 211–911)
VLDL: 24 MG/DL (ref 0–40)
WBC # BLD AUTO: 4.6 X10*3/UL (ref 4.4–11.3)

## 2024-07-03 PROCEDURE — 3062F POS MACROALBUMINURIA REV: CPT | Performed by: PHYSICIAN ASSISTANT

## 2024-07-03 PROCEDURE — 1160F RVW MEDS BY RX/DR IN RCRD: CPT | Performed by: PHYSICIAN ASSISTANT

## 2024-07-03 PROCEDURE — 3008F BODY MASS INDEX DOCD: CPT | Performed by: PHYSICIAN ASSISTANT

## 2024-07-03 PROCEDURE — 80074 ACUTE HEPATITIS PANEL: CPT

## 2024-07-03 PROCEDURE — 90750 HZV VACC RECOMBINANT IM: CPT | Performed by: PHYSICIAN ASSISTANT

## 2024-07-03 PROCEDURE — 4010F ACE/ARB THERAPY RXD/TAKEN: CPT | Performed by: PHYSICIAN ASSISTANT

## 2024-07-03 PROCEDURE — 82570 ASSAY OF URINE CREATININE: CPT

## 2024-07-03 PROCEDURE — 83540 ASSAY OF IRON: CPT

## 2024-07-03 PROCEDURE — 82607 VITAMIN B-12: CPT

## 2024-07-03 PROCEDURE — 82728 ASSAY OF FERRITIN: CPT

## 2024-07-03 PROCEDURE — 3074F SYST BP LT 130 MM HG: CPT | Performed by: PHYSICIAN ASSISTANT

## 2024-07-03 PROCEDURE — 36415 COLL VENOUS BLD VENIPUNCTURE: CPT

## 2024-07-03 PROCEDURE — 1036F TOBACCO NON-USER: CPT | Performed by: PHYSICIAN ASSISTANT

## 2024-07-03 PROCEDURE — 3048F LDL-C <100 MG/DL: CPT | Performed by: PHYSICIAN ASSISTANT

## 2024-07-03 PROCEDURE — 1159F MED LIST DOCD IN RCRD: CPT | Performed by: PHYSICIAN ASSISTANT

## 2024-07-03 PROCEDURE — 82390 ASSAY OF CERULOPLASMIN: CPT

## 2024-07-03 PROCEDURE — 80053 COMPREHEN METABOLIC PANEL: CPT

## 2024-07-03 PROCEDURE — 99397 PER PM REEVAL EST PAT 65+ YR: CPT | Performed by: PHYSICIAN ASSISTANT

## 2024-07-03 PROCEDURE — 85027 COMPLETE CBC AUTOMATED: CPT

## 2024-07-03 PROCEDURE — 82043 UR ALBUMIN QUANTITATIVE: CPT

## 2024-07-03 PROCEDURE — 3079F DIAST BP 80-89 MM HG: CPT | Performed by: PHYSICIAN ASSISTANT

## 2024-07-03 PROCEDURE — 80061 LIPID PANEL: CPT

## 2024-07-03 PROCEDURE — 83036 HEMOGLOBIN GLYCOSYLATED A1C: CPT | Performed by: PHYSICIAN ASSISTANT

## 2024-07-03 PROCEDURE — 90471 IMMUNIZATION ADMIN: CPT | Performed by: PHYSICIAN ASSISTANT

## 2024-07-03 PROCEDURE — 83550 IRON BINDING TEST: CPT

## 2024-07-03 ASSESSMENT — ENCOUNTER SYMPTOMS
NAUSEA: 0
VOMITING: 0
CHEST TIGHTNESS: 0
SHORTNESS OF BREATH: 0
DIARRHEA: 0
PALPITATIONS: 0
HEADACHES: 0
ABDOMINAL PAIN: 0
FATIGUE: 0
LIGHT-HEADEDNESS: 0
CONSTIPATION: 0
MYALGIAS: 1

## 2024-07-03 NOTE — ASSESSMENT & PLAN NOTE
- Sugars have been very well controlled! Congratulated the pt on healthy lifestyle modifications and medication compliance   - Will continue current tx plan   - Follow up 6 months

## 2024-07-03 NOTE — ASSESSMENT & PLAN NOTE
- Potentially d/t steatosis noted on hepatic U/S (no indication of hepatotoxic medications in meds list)  - LFTs ordered (ceruloplasmin, ferritin, hepatitis panel)  - Encouraged to continue lifestyle and medication adherence given decreasing A1C

## 2024-07-03 NOTE — ASSESSMENT & PLAN NOTE
Preventive:   - Lives at home in [place] with    - Employment - [employer]    - Labs: Amenable to receiving labs today  - Colon CA: Cologuard this year (clear)  - Mamm: Received last year (clear)  - PAP: Not received over past 3 years (unknown results prior to 2020)  - DEXA: No indications of osteopenia/porosis  - Shingrix: Received today  - Pneumovax/ Prevnar: Declined today (readdress next visit)  - Td: May need to address next visit  - Diet: Some fatty foods, limiting sweets and spices  - Exercise: At work (walking, climbing)  - Tobacco: None  - Illicit drugs: None  - Alcohol: 3-4 beers on Fridays  - Mood: more perturbed lately - attributing to losing abilities with age   - Dentist visits: twice annually  - Eye exams: annual

## 2024-07-03 NOTE — PROGRESS NOTES
Subjective   Patient ID: Daylin Tyler is a 64 y.o. female who presents for Follow-up (Pt here today for a DM2 follow up and get A1C in office. Pt states had veins taken out of bilateral legs on 6/28 just wanted to let you know. Pt states she hasn't been on the Wellbutrin since beginning of June because she had ran out but made her shaky anyway's so didn't want to start it back up.  ) and Medication Question (Pt needs to know if she should be on the Lisinopril 20mg or 30mg because yesterday a script was sent for the 20mg but she thought she was on 30mg. Please verify with pt and change in computer too. ).    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 Next in 2027  - Mamm: Received mammogram last year (clear)  - PAP: Has not received pap in a few years (has declined past 3 years)  - DEXA: Returned (lowest score -0.9 in spine - no indication of osteopenia/porosis).  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)  - Tobacco: No  - Illicit drugs: No  - Alcohol: 3-4 beers on Fridays  - Mood: Overall feeling slightly more aggravated, attributes part of this to growing older (growing weaker, more pain)  - Dentist visits: Q6 mo  - Eye exams: Annually    DM II  - A1C 6.9 (decrease from 7.2 on 4/12)  - Some weight loss (2 lbs from last visit)  - Trying to eat less fatty, fried foods, more water (stopped drinking pop)  - Adhering to medications     GERD  - Experiencing with a lot of foods every day  - Taking Nexium every day  "(understands PRN use)    SLE and Fibromyalgia  - Following w/ Reumatology  - Receiving Benlysta (maybe moving to infusions)      Review of Systems   Constitutional:  Negative for fatigue.   Respiratory:  Negative for chest tightness and shortness of breath.    Cardiovascular:  Negative for palpitations.   Gastrointestinal:  Negative for abdominal pain, constipation, diarrhea, nausea and vomiting.   Endocrine: Positive for cold intolerance.   Musculoskeletal:  Positive for myalgias.   Neurological:  Negative for light-headedness and headaches.       Objective   /82   Pulse 85   Temp 37.2 °C (98.9 °F)   Resp 18   Ht 1.549 m (5' 1\")   Wt 84.4 kg (186 lb)   SpO2 100%   BMI 35.14 kg/m²     Physical Exam  Constitutional:       Appearance: Normal appearance. She is obese.   Cardiovascular:      Rate and Rhythm: Normal rate and regular rhythm.   Pulmonary:      Effort: Pulmonary effort is normal.      Breath sounds: Normal breath sounds.   Neurological:      Mental Status: She is alert.   Psychiatric:         Mood and Affect: Mood normal.         Behavior: Behavior normal.         Thought Content: Thought content normal.         Judgment: Judgment normal.       Assessment/Plan   Problem List Items Addressed This Visit       Type 2 diabetes mellitus with circulatory disorder, without long-term current use of insulin (Multi)    Overview     - Most recent A1c was 6.9% (7/3/24) down from 7.2%  prior   - Current meds: metformin  mg and Rybelsus 3 mg  - Most recent eGFR was 104 (2/8/24)  - Most recent UACr was negative (5/9/24)   - Pt is on ACE-I - lisinopril   - NOT on aspirin   - NOT on statin          Current Assessment & Plan     - Sugars have been very well controlled! Congratulated the pt on healthy lifestyle modifications and medication compliance   - Will continue current tx plan   - Follow up 6 months          Relevant Orders    POCT glycosylated hemoglobin (Hb A1C) manually resulted (Completed)    " Albumin-Creatinine Ratio, Urine Random (Completed)    Transaminitis    Current Assessment & Plan     - Potentially d/t steatosis noted on hepatic U/S (no indication of hepatotoxic medications in meds list)  - LFTs ordered (ceruloplasmin, ferritin, hepatitis panel)  - Encouraged to continue lifestyle and medication adherence given decreasing A1C         Relevant Orders    Ceruloplasmin    Ferritin (Completed)    Hepatitis Panel, Acute    Annual physical exam - Primary    Overview     - Lives in [place] with her  Will   - They have 3 adults sons who live close - doesn't see them often though   - Works as  for transport trucks   - Cologuard neg 5/2/24 - next due 5/2027           Current Assessment & Plan     Preventive:   - Lives at home in [place] with    - Employment - [employer]    - Labs: Amenable to receiving labs today  - Colon CA: Cologuard this year (clear)  - Mamm: Received last year (clear)  - PAP: Not received over past 3 years (unknown results prior to 2020)  - DEXA: No indications of osteopenia/porosis  - Shingrix: Received today  - Pneumovax/ Prevnar: Declined today (readdress next visit)  - Td: May need to address next visit  - Diet: Some fatty foods, limiting sweets and spices  - Exercise: At work (walking, climbing)  - Tobacco: None  - Illicit drugs: None  - Alcohol: 3-4 beers on Fridays  - Mood: more perturbed lately - attributing to losing abilities with age   - Dentist visits: twice annually  - Eye exams: annual         Relevant Orders    CBC (Completed)    Comprehensive Metabolic Panel (Completed)    Albumin-Creatinine Ratio, Urine Random (Completed)    Lipid Panel (Completed)     Other Visit Diagnoses       Need for shingles vaccine        Relevant Orders    Zoster vaccine, recombinant, adult (SHINGRIX) (Completed)               Signed: KAITLYN Muñoz-S2     I was present with the PA student who participated in the documentation of this note. I have  personally seen and re-examined the patient and performed the medical decision-making components (assessment and plan of care). I have reviewed the PA student documentation and verified the findings in the note as written with additions or exceptions as stated in the body of this note.    Regina Romo PA-C

## 2024-07-03 NOTE — PROGRESS NOTES
Removed varicose veins in    Cologuard - next 3 yrs  Mammogram - last year  Dexa - last year  PAP -     Tdap - believe UTD  Pneumococcal -   Shingrix - amenable to receiving today    No smoking, no drugs  Alcohol - 3-4 on Fridays each week    Mood - overall sometimes feeling ok, sometimes feeling aggravated.       DM II  - A1C 6.9  - Weight went down (  - Trying to eat less food, more water (not drinking pop)  - Keeping up with medications  - Walking, climbing ("[employer] )    Lupus and Fibromyalgia  - Follow up w/ Reumatology  - Receiving Benlysta (maybe moving to infusions)    GERD  - Experiencing with a lot of foods every day  - Educated on PRN

## 2024-07-04 LAB — CERULOPLASMIN SERPL-MCNC: 38.4 MG/DL (ref 20–60)

## 2024-07-08 LAB
IRON SATN MFR SERPL: ABNORMAL %
IRON SERPL-MCNC: 50 UG/DL (ref 35–150)
TIBC SERPL-MCNC: ABNORMAL UG/DL
UIBC SERPL-MCNC: >450 UG/DL (ref 110–370)

## 2024-07-23 ENCOUNTER — APPOINTMENT (OUTPATIENT)
Dept: PHARMACY | Facility: HOSPITAL | Age: 64
End: 2024-07-23
Payer: COMMERCIAL

## 2024-07-23 DIAGNOSIS — E11.9 TYPE 2 DIABETES MELLITUS WITHOUT COMPLICATION, WITHOUT LONG-TERM CURRENT USE OF INSULIN (MULTI): ICD-10-CM

## 2024-07-23 DIAGNOSIS — E11.59 TYPE 2 DIABETES MELLITUS WITH OTHER CIRCULATORY COMPLICATION, WITHOUT LONG-TERM CURRENT USE OF INSULIN (MULTI): ICD-10-CM

## 2024-07-23 NOTE — PROGRESS NOTES
Pharmacist Clinic: ARLETH Goins was referred to the Clinical Pharmacy Team for financial assistance with Rybelsus.    Referring Provider: Regina Romo PA-C  Problem List Items Addressed This Visit       Type 2 diabetes mellitus with circulatory disorder, without long-term current use of insulin (Multi)     Other Visit Diagnoses       Type 2 diabetes mellitus without complication, without long-term current use of insulin (Multi)              HISTORY OF PRESENT ILLNESS  Spoke with patient today regarding diabetes management and financial assistance with Rybelsus. Patient reports taking metformin  mg daily and Rybelsus 3 mg daily. Denies any side effects or concerns with therapy. Patient reports losing a few pounds and is interested in additional weight loss.     Patient was approved for  PAP for Rybelsus until 5/2025.     MEDICATION ASSESSMENT    Allergies: Sulfamethoxazole     [facility] - [place], [place] - [address]  [address]  [facility] [address]  Phone: [phone] Fax: [phone]    [facility]  [address], [address]  [facility] [address]  Phone: [phone] Fax: [phone]    LAB  Lab Results   Component Value Date    BILITOT 0.7 07/03/2024    CALCIUM 8.9 07/03/2024    CO2 28 07/03/2024     07/03/2024    CREATININE 0.49 (L) 07/03/2024    GLUCOSE 185 (H) 07/03/2024    ALKPHOS 193 (H) 07/03/2024    K 4.8 07/03/2024    PROT 6.8 07/03/2024     07/03/2024    AST 62 (H) 07/03/2024    ALT 33 07/03/2024    BUN 8 07/03/2024    ANIONGAP 11 07/03/2024    ALBUMIN 3.8 07/03/2024    LIPASE 35 03/13/2020    EGFR >90 07/03/2024     Lab Results   Component Value Date    TRIG 122 07/03/2024    CHOL 155 07/03/2024    LDLCALC 66 07/03/2024    HDL 64.6 07/03/2024     Lab Results   Component Value Date    HGBA1C 6.9 (A) 07/03/2024       DRUG INTERACTIONS  - No significant drug-drug interactions exist that require adjustment to therapy    CURRENT  PHARMACOTHERAPY  - metformin  mg daily   - Rybelsus 3 mg daily    PATIENT EDUCATION/DISCUSSION  - Will plan to increase Rybelsus today for additional blood glucose lowering and weight loss potential  - Counseled patient on MOA, expectations, side effects, duration of therapy, contraindications, administration, and monitoring parameters  - Answered all patient questions and concerns; provided [facility] phone number if issues/questions arise    RECOMMENDATIONS/PLAN  1.  Increase: Rybelsus 7 mg daily. Prescription sent to [facility] pharmacy  2. Continue: Continue all meds under the continuation of care with the referring provider and clinical pharmacy team.    Clinical Pharmacist follow up: 8/20/24 @ 12:30 pm (patient's lunch break)  Type of Encounter: Virtual    Thank you,  Apoorva Koch, PharmD    Verbal consent to manage patient's drug therapy was obtained from patient. They were informed they may decline to participate or withdraw from participation in pharmacy services at any time.

## 2024-07-27 PROCEDURE — RXMED WILLOW AMBULATORY MEDICATION CHARGE

## 2024-07-31 ENCOUNTER — PHARMACY VISIT (OUTPATIENT)
Dept: PHARMACY | Facility: CLINIC | Age: 64
End: 2024-07-31
Payer: COMMERCIAL

## 2024-08-04 DIAGNOSIS — I10 PRIMARY HYPERTENSION: ICD-10-CM

## 2024-08-05 RX ORDER — FOLIC ACID 1 MG/1
1 TABLET ORAL DAILY
Qty: 90 TABLET | Refills: 1 | Status: SHIPPED | OUTPATIENT
Start: 2024-08-05 | End: 2025-08-05

## 2024-08-20 ENCOUNTER — APPOINTMENT (OUTPATIENT)
Dept: PHARMACY | Facility: HOSPITAL | Age: 64
End: 2024-08-20
Payer: COMMERCIAL

## 2024-08-20 DIAGNOSIS — E11.59 TYPE 2 DIABETES MELLITUS WITH OTHER CIRCULATORY COMPLICATION, WITHOUT LONG-TERM CURRENT USE OF INSULIN (MULTI): ICD-10-CM

## 2024-08-20 DIAGNOSIS — E11.9 TYPE 2 DIABETES MELLITUS WITHOUT COMPLICATION, WITHOUT LONG-TERM CURRENT USE OF INSULIN (MULTI): ICD-10-CM

## 2024-08-20 NOTE — PROGRESS NOTES
Pharmacist Clinic: ARLETH Goins was referred to the Clinical Pharmacy Team for financial assistance with Rybelsus.    Referring Provider: Regina Romo PA-C  Problem List Items Addressed This Visit       Type 2 diabetes mellitus with circulatory disorder, without long-term current use of insulin (Multi)     Other Visit Diagnoses       Type 2 diabetes mellitus without complication, without long-term current use of insulin (Multi)              HISTORY OF PRESENT ILLNESS  Spoke with patient today regarding diabetes management and financial assistance with Rybelsus. Since last visit, patient reports metformin was discontinued, and no longer having GI side effects.     Patient reports she has been taking Rybelsus 7 mg daily. Denies any side effects or concerns with therapy. Patient also states she was approved for Benlysta infusion and set to start soon.    Patient was approved for  PAP for Rybelsus until 5/2025.     MEDICATION ASSESSMENT    Allergies: Sulfamethoxazole     [facility] - [place], [place] - [address]  [address] [facility] [address]  Phone: [phone] Fax: [phone]    [facility]  [address], [address]  [facility] [address]  Phone: [phone] Fax: [phone]    LAB  Lab Results   Component Value Date    BILITOT 0.7 07/03/2024    CALCIUM 8.9 07/03/2024    CO2 28 07/03/2024     07/03/2024    CREATININE 0.49 (L) 07/03/2024    GLUCOSE 185 (H) 07/03/2024    ALKPHOS 193 (H) 07/03/2024    K 4.8 07/03/2024    PROT 6.8 07/03/2024     07/03/2024    AST 62 (H) 07/03/2024    ALT 33 07/03/2024    BUN 8 07/03/2024    ANIONGAP 11 07/03/2024    ALBUMIN 3.8 07/03/2024    LIPASE 35 03/13/2020    EGFR >90 07/03/2024     Lab Results   Component Value Date    TRIG 122 07/03/2024    CHOL 155 07/03/2024    LDLCALC 66 07/03/2024    HDL 64.6 07/03/2024     Lab Results   Component Value Date    HGBA1C 6.9 (A) 07/03/2024       DRUG INTERACTIONS  - No  significant drug-drug interactions exist that require adjustment to therapy    CURRENT PHARMACOTHERAPY  - Rybelsus 7 mg daily    PATIENT EDUCATION/DISCUSSION  - Will plan to continue current therapy today  - Patient confirmed follow-up with PCP in Jan. 2025; will have one more follow-up until PCP follow-up  - Counseled patient on MOA, expectations, side effects, duration of therapy, contraindications, administration, and monitoring parameters  - Answered all patient questions and concerns; provided [facility] phone number if issues/questions arise    RECOMMENDATIONS/PLAN  1. Continue: Rybelsus 7 mg daily.   2. Continue: Continue all meds under the continuation of care with the referring provider and clinical pharmacy team.    Clinical Pharmacist follow up: 10/15/24 @ 12:30 pm (patient's lunch break)  Type of Encounter: Virtual    Thank you,  Apoorva Koch, PharmD    Verbal consent to manage patient's drug therapy was obtained from patient. They were informed they may decline to participate or withdraw from participation in pharmacy services at any time.

## 2024-08-23 PROCEDURE — RXMED WILLOW AMBULATORY MEDICATION CHARGE

## 2024-08-29 ENCOUNTER — PHARMACY VISIT (OUTPATIENT)
Dept: PHARMACY | Facility: CLINIC | Age: 64
End: 2024-08-29
Payer: COMMERCIAL

## 2024-09-22 PROCEDURE — RXMED WILLOW AMBULATORY MEDICATION CHARGE

## 2024-09-27 ENCOUNTER — PHARMACY VISIT (OUTPATIENT)
Dept: PHARMACY | Facility: CLINIC | Age: 64
End: 2024-09-27
Payer: COMMERCIAL

## 2024-10-15 ENCOUNTER — APPOINTMENT (OUTPATIENT)
Dept: PHARMACY | Facility: HOSPITAL | Age: 64
End: 2024-10-15
Payer: COMMERCIAL

## 2024-10-15 DIAGNOSIS — E11.9 TYPE 2 DIABETES MELLITUS WITHOUT COMPLICATION, WITHOUT LONG-TERM CURRENT USE OF INSULIN (MULTI): ICD-10-CM

## 2024-10-15 DIAGNOSIS — E11.59 TYPE 2 DIABETES MELLITUS WITH OTHER CIRCULATORY COMPLICATION, WITHOUT LONG-TERM CURRENT USE OF INSULIN: ICD-10-CM

## 2024-10-15 NOTE — PROGRESS NOTES
Pharmacist Clinic: ARLETH Goins was referred to the Clinical Pharmacy Team for financial assistance with Rybelsus.    Referring Provider: Regina Romo PA-C  Covering Provider until Jan. 2025: Dr. Viktoriya Edouard    Problem List Items Addressed This Visit       Type 2 diabetes mellitus with circulatory disorder, without long-term current use of insulin     Other Visit Diagnoses       Type 2 diabetes mellitus without complication, without long-term current use of insulin (Multi)              HISTORY OF PRESENT ILLNESS  Spoke with patient today regarding diabetes management and financial assistance with Rybelsus. Patient reports she has been taking Rybelsus 7 mg daily. Denies any side effects or concerns with therapy.     Patient also states she was approved for Benlysta infusion every 4th week is completed first infusion. Reports fatigue after infusion, however prefers this to injections.    Patient was approved for  PAP for Rybelsus until 5/2025.     MEDICATION ASSESSMENT    Allergies: Metformin and Sulfamethoxazole     [facility] - [place], [place] - [address]  [address] [facility] [address]  Phone: [phone] Fax: [phone]    [facility]  [address], [address]  [facility] [address]  Phone: [phone] Fax: [phone]    LAB  Lab Results   Component Value Date    BILITOT 0.7 07/03/2024    CALCIUM 8.9 07/03/2024    CO2 28 07/03/2024     07/03/2024    CREATININE 0.49 (L) 07/03/2024    GLUCOSE 185 (H) 07/03/2024    ALKPHOS 193 (H) 07/03/2024    K 4.8 07/03/2024    PROT 6.8 07/03/2024     07/03/2024    AST 62 (H) 07/03/2024    ALT 33 07/03/2024    BUN 8 07/03/2024    ANIONGAP 11 07/03/2024    ALBUMIN 3.8 07/03/2024    LIPASE 35 03/13/2020    EGFR >90 07/03/2024     Lab Results   Component Value Date    TRIG 122 07/03/2024    CHOL 155 07/03/2024    LDLCALC 66 07/03/2024    HDL 64.6 07/03/2024     Lab Results   Component Value Date     HGBA1C 6.9 (A) 07/03/2024       DRUG INTERACTIONS  - No significant drug-drug interactions exist that require adjustment to therapy    CURRENT PHARMACOTHERAPY  - Rybelsus 7 mg daily    PATIENT EDUCATION/DISCUSSION  - Will plan to continue current therapy today  - Patient confirmed follow-up with PCP in Jan. 2025; will follow-up after labs/PCP follow-up  - Counseled patient on MOA, expectations, side effects, duration of therapy, contraindications, administration, and monitoring parameters  - Answered all patient questions and concerns; provided [facility] phone number if issues/questions arise    RECOMMENDATIONS/PLAN  1. Continue: Rybelsus 7 mg daily.   2. Continue: Continue all meds under the continuation of care with the referring provider and clinical pharmacy team.    Clinical Pharmacist follow up: 1/14/25 @ 12:30 pm (patient's lunch break)  Type of Encounter: Virtual    Thank you,  Apoorva Koch, PharmD    Verbal consent to manage patient's drug therapy was obtained from patient. They were informed they may decline to participate or withdraw from participation in pharmacy services at any time.

## 2024-10-16 PROCEDURE — RXMED WILLOW AMBULATORY MEDICATION CHARGE

## 2024-10-21 ENCOUNTER — PHARMACY VISIT (OUTPATIENT)
Dept: PHARMACY | Facility: CLINIC | Age: 64
End: 2024-10-21
Payer: COMMERCIAL

## 2024-11-30 DIAGNOSIS — I10 PRIMARY HYPERTENSION: ICD-10-CM

## 2024-12-02 RX ORDER — LISINOPRIL 30 MG/1
30 TABLET ORAL DAILY
Qty: 90 TABLET | Refills: 1 | Status: SHIPPED | OUTPATIENT
Start: 2024-12-02

## 2025-01-03 ENCOUNTER — APPOINTMENT (OUTPATIENT)
Dept: PRIMARY CARE | Facility: CLINIC | Age: 65
End: 2025-01-03
Payer: COMMERCIAL

## 2025-01-07 ENCOUNTER — APPOINTMENT (OUTPATIENT)
Dept: PRIMARY CARE | Facility: CLINIC | Age: 65
End: 2025-01-07
Payer: COMMERCIAL

## 2025-01-07 VITALS
DIASTOLIC BLOOD PRESSURE: 74 MMHG | OXYGEN SATURATION: 99 % | HEART RATE: 76 BPM | WEIGHT: 184 LBS | HEIGHT: 61 IN | SYSTOLIC BLOOD PRESSURE: 132 MMHG | TEMPERATURE: 97.7 F | BODY MASS INDEX: 34.74 KG/M2

## 2025-01-07 DIAGNOSIS — I15.2 HYPERTENSION ASSOCIATED WITH TYPE 2 DIABETES MELLITUS (MULTI): Primary | ICD-10-CM

## 2025-01-07 DIAGNOSIS — Z23 FLU VACCINE NEED: ICD-10-CM

## 2025-01-07 DIAGNOSIS — Z12.31 SCREENING MAMMOGRAM, ENCOUNTER FOR: ICD-10-CM

## 2025-01-07 DIAGNOSIS — E11.59 HYPERTENSION ASSOCIATED WITH TYPE 2 DIABETES MELLITUS (MULTI): Primary | ICD-10-CM

## 2025-01-07 DIAGNOSIS — K21.9 GASTROESOPHAGEAL REFLUX DISEASE WITHOUT ESOPHAGITIS: ICD-10-CM

## 2025-01-07 DIAGNOSIS — M79.7 FIBROMYALGIA: ICD-10-CM

## 2025-01-07 DIAGNOSIS — M32.9 SYSTEMIC LUPUS ERYTHEMATOSUS, UNSPECIFIED SLE TYPE, UNSPECIFIED ORGAN INVOLVEMENT STATUS (MULTI): ICD-10-CM

## 2025-01-07 DIAGNOSIS — R09.81 NASAL CONGESTION: ICD-10-CM

## 2025-01-07 DIAGNOSIS — E11.59 TYPE 2 DIABETES MELLITUS WITH OTHER CIRCULATORY COMPLICATION, WITHOUT LONG-TERM CURRENT USE OF INSULIN: ICD-10-CM

## 2025-01-07 LAB — POC HEMOGLOBIN A1C: 6.1 % (ref 4.2–6.5)

## 2025-01-07 PROCEDURE — 83036 HEMOGLOBIN GLYCOSYLATED A1C: CPT | Performed by: PHYSICIAN ASSISTANT

## 2025-01-07 PROCEDURE — 99213 OFFICE O/P EST LOW 20 MIN: CPT | Performed by: PHYSICIAN ASSISTANT

## 2025-01-07 PROCEDURE — 90662 IIV NO PRSV INCREASED AG IM: CPT | Performed by: PHYSICIAN ASSISTANT

## 2025-01-07 PROCEDURE — 4010F ACE/ARB THERAPY RXD/TAKEN: CPT | Performed by: PHYSICIAN ASSISTANT

## 2025-01-07 PROCEDURE — 3008F BODY MASS INDEX DOCD: CPT | Performed by: PHYSICIAN ASSISTANT

## 2025-01-07 PROCEDURE — 1159F MED LIST DOCD IN RCRD: CPT | Performed by: PHYSICIAN ASSISTANT

## 2025-01-07 PROCEDURE — 3078F DIAST BP <80 MM HG: CPT | Performed by: PHYSICIAN ASSISTANT

## 2025-01-07 PROCEDURE — 3075F SYST BP GE 130 - 139MM HG: CPT | Performed by: PHYSICIAN ASSISTANT

## 2025-01-07 PROCEDURE — 1160F RVW MEDS BY RX/DR IN RCRD: CPT | Performed by: PHYSICIAN ASSISTANT

## 2025-01-07 PROCEDURE — 90471 IMMUNIZATION ADMIN: CPT | Performed by: PHYSICIAN ASSISTANT

## 2025-01-07 PROCEDURE — 1036F TOBACCO NON-USER: CPT | Performed by: PHYSICIAN ASSISTANT

## 2025-01-07 RX ORDER — IBUPROFEN 800 MG/1
1 TABLET ORAL EVERY 8 HOURS PRN
COMMUNITY
Start: 2024-11-15

## 2025-01-07 RX ORDER — FLUTICASONE PROPIONATE 50 MCG
1 SPRAY, SUSPENSION (ML) NASAL DAILY
Qty: 16 G | Refills: 5 | Status: SHIPPED | OUTPATIENT
Start: 2025-01-07 | End: 2026-01-07

## 2025-01-07 NOTE — PROGRESS NOTES
Subjective   Patient ID: Daylin Tyler is a 64 y.o. female who presents for Follow-up.    HPI   Mammo due  DEXA done 12/27/23, Dr Tsai ordered  Regina pt here today for a 6 month follow up for DM2.    Towards the evening started getting nasal/throat congestion and hard to breathe out of nose when laying down. Sx x 3 wk, she feels good.    Wants Flu vaccine  Last labs: 7/3/24      HTN-patient is compliant with all antihypertensives and does not complain of any chest pain, shortness of breath, lower extremity edema, or palpitations.  Patient does not check blood pressure at home but here in the office the blood pressure is stable.    DM II  - A1C TODAY 6.1%, decreased from 6.9% on 7/3/24  - Some weight loss (2 lbs from last visit)  - Trying to eat less fatty, fried foods, more water (stopped drinking pop)  - Adhering to medications  No polyuria/polydipsia  Meds: Rybelsus 7mg daily  She is on Lisinopril 30mg (LDL 7/3/24 was 66)  Not on ASA  Not on statin        GERD  - Experiencing with a lot of foods every day  - Taking Nexium every day   She denies blood in her stool     SLE and Fibromyalgia  - Following w/ Rheumatology  - Receiving Benlysta (maybe moving to infusions q 4 wk)  Last appt was Nov 17th, next f/up is in Feb, she follows every 3 mo.        Review of Systems  Constitutional: Patient denies any fever, chills, loss of appetite, or unexplained weight loss.  Cardiovascular: Patient denies any chest pain, shortness of breath with exertion, tachycardia, palpitations, orthopnea, or paroxysmal nocturnal dyspnea.  Respiratory: Patient denies any cough, shortness breath, or wheezing.  Gastrointestinal patient denies any nausea, vomiting, diarrhea, constipation, melena, hematochezia, or reflux symptoms  Skin: Denies any rashes or skin lesions   Neurology: Patient denies any new motor or sensory losses.  Denies any numbness, tingling, weakness, and incoordination of the extremities.  Patient also denies any tremor,  "seizures, or gait instability.  Endocrinology: Denies any polyuria, polydipsia, polyphagia, or heat/cold intolerance.    Objective   /74   Pulse 76   Temp 36.5 °C (97.7 °F)   Ht 1.549 m (5' 1\")   Wt 83.5 kg (184 lb)   SpO2 99%   BMI 34.77 kg/m²     Physical Exam  Gen. appearance: Alert and cooperative, in no acute distress, well-developed, well-nourished obese female.  Neck: Supple and without adenopathy or rigidity.  There is no JVD at 90° and no carotid bruits are noted.  There is no thyromegaly, thyroid tenderness, or palpable thyroid nodules.  Heart: Regular rate and rhythm without murmur or ectopy.  Lungs: Lungs are clear to auscultation bilaterally with good air exchange.  Skin: Good turgor, moist, warm and without rashes or lesions.  Neurological exam: Alert and oriented ×3, no tremor, normal gait.  Extremities: No clubbing, cyanosis, or edema    Assessment/Plan   Diagnoses and all orders for this visit:  Hypertension associated with type 2 diabetes mellitus (Multi)-  Blood pressure is well-controlled at 132/74 today.  She is lost 2 pounds since her last visit  She tolerates the medication well and was told to continue current dosing of lisinopril 30 mg.      Type 2 diabetes mellitus with other circulatory complication, without long-term current use of insulin  -     POCT glycosylated hemoglobin (Hb A1C) manually resulted  -     Follow Up In [facility] - PCP - Established; Future  Patient's A1c dropped from 6.9 to 6.1% on Rybelsus 7 mg daily.  She denies side effects from use.  She will continue with current dosing and we will continue to monitor.  She should be checking her feet weekly, she knows that she needs a diabetic eye exam yearly.   She is currently on a statin  She is not on ASA    Nasal congestion  -     fluticasone (Flonase) 50 mcg/actuation nasal spray; Administer 1 spray into each nostril once daily. Shake gently. Before first use, prime pump. After use, clean tip and " replace cap.  Patient's nasal congestion is mainly at night.  Humidifier was suggested for her room and she will try Flonase nightly to see if this improves symptoms.  She does not feel sick today.    Gastroesophageal reflux disease without esophagitis-Nexium is working well for her symptoms.  She will continue with current dosing.    Systemic lupus erythematosus & Fibromyalgia-patient sees Dr. Tsai, Rheumatologist regularly.  She states that her symptoms are well-controlled on the Benlysta every 4 week infusion.   She will continue to see her rheumatologist.      Screening mammogram, encounter for  -     BI mammo bilateral screening tomosynthesis; Future  Flu vaccine need  -     Flu vaccine, trivalent, preservative free, HIGH-DOSE, age 65y+ (Fluzone)  Pt understands with verbalization that vaccines all have risks (to include: local reaction, systemic reaction). Pt has reviewed the VIS (Vaccine information sheet) and gives consent to have this vaccination despite the risks.    Patient is to return to the clinic in 6 months.  Mammogram was due so that was ordered for today.  Her 6-month follow-up will be for diabetes and a wellness exam.  She will have labs done on that day.    Patient understands that should they have testing outside   facilities that we may not receive the results and was told to call us if they have not heard from our office within a week after testing.    [facility] uses voice recognition technology for dictations. Sometimes the software misinterprets words. Please take this into account when reading this.

## 2025-01-08 ENCOUNTER — HOSPITAL ENCOUNTER (OUTPATIENT)
Dept: RADIOLOGY | Facility: HOSPITAL | Age: 65
Discharge: HOME | End: 2025-01-08
Payer: COMMERCIAL

## 2025-01-08 DIAGNOSIS — Z12.31 SCREENING MAMMOGRAM, ENCOUNTER FOR: ICD-10-CM

## 2025-01-08 PROCEDURE — 77067 SCR MAMMO BI INCL CAD: CPT | Performed by: RADIOLOGY

## 2025-01-08 PROCEDURE — 77067 SCR MAMMO BI INCL CAD: CPT

## 2025-01-08 PROCEDURE — 77063 BREAST TOMOSYNTHESIS BI: CPT | Performed by: RADIOLOGY

## 2025-01-14 ENCOUNTER — APPOINTMENT (OUTPATIENT)
Dept: PHARMACY | Facility: HOSPITAL | Age: 65
End: 2025-01-14
Payer: COMMERCIAL

## 2025-01-14 DIAGNOSIS — E11.9 TYPE 2 DIABETES MELLITUS WITHOUT COMPLICATION, WITHOUT LONG-TERM CURRENT USE OF INSULIN (MULTI): ICD-10-CM

## 2025-01-14 DIAGNOSIS — E11.59 TYPE 2 DIABETES MELLITUS WITH OTHER CIRCULATORY COMPLICATION, WITHOUT LONG-TERM CURRENT USE OF INSULIN: ICD-10-CM

## 2025-01-14 NOTE — PROGRESS NOTES
Pharmacist Clinic: ARLETH Goins was referred to the Clinical Pharmacy Team for financial assistance with Rybelsus.    Referring Provider: Regina Romo PA-C  Covering Provider until Jan. 2025: Dr. Viktoriya Edouard    Problem List Items Addressed This Visit       Type 2 diabetes mellitus with circulatory disorder, without long-term current use of insulin     Other Visit Diagnoses       Type 2 diabetes mellitus without complication, without long-term current use of insulin (Multi)              HISTORY OF PRESENT ILLNESS  Spoke with patient today regarding diabetes management and financial assistance with Rybelsus. Patient reports she has been taking Rybelsus 7 mg daily. Denies any side effects or concerns with therapy. Patient saw covering PCP earlier this month/labs. A1c has improved to 6.1%.    Patient was approved for  PAP for Rybelsus until 5/2025.     MEDICATION ASSESSMENT    Allergies: Metformin and Sulfamethoxazole     [facility] - [place], [place] - [address]  [address] [facility] [address]  Phone: [phone] Fax: [phone]    [facility]  [address], [address]  [facility] [address]  Phone: [phone] Fax: [phone]    LAB  Lab Results   Component Value Date    BILITOT 0.7 07/03/2024    CALCIUM 8.9 07/03/2024    CO2 28 07/03/2024     07/03/2024    CREATININE 0.49 (L) 07/03/2024    GLUCOSE 185 (H) 07/03/2024    ALKPHOS 193 (H) 07/03/2024    K 4.8 07/03/2024    PROT 6.8 07/03/2024     07/03/2024    AST 62 (H) 07/03/2024    ALT 33 07/03/2024    BUN 8 07/03/2024    ANIONGAP 11 07/03/2024    ALBUMIN 3.8 07/03/2024    LIPASE 35 03/13/2020    EGFR >90 07/03/2024     Lab Results   Component Value Date    TRIG 122 07/03/2024    CHOL 155 07/03/2024    LDLCALC 66 07/03/2024    HDL 64.6 07/03/2024     Lab Results   Component Value Date    HGBA1C 6.1 01/07/2025       DRUG INTERACTIONS  - No significant drug-drug interactions exist that  require adjustment to therapy    CURRENT PHARMACOTHERAPY  - Rybelsus 7 mg daily    PATIENT EDUCATION/DISCUSSION  - Will plan to continue current therapy today  - Advised to bring in financials to PCP's office in April 2025 for  PAP re-enrollment  - Counseled patient on MOA, expectations, side effects, duration of therapy, contraindications, administration, and monitoring parameters  - Answered all patient questions and concerns; provided [facility] phone number if issues/questions arise    RECOMMENDATIONS/PLAN  1. Continue: Rybelsus 7 mg daily.   2. Continue: Continue all meds under the continuation of care with the referring provider and clinical pharmacy team.    Clinical Pharmacist follow up: 4/15/25 @ 12:30 pm (patient's lunch break)  Type of Encounter: Virtual    Thank you,  Apoorva Koch, PharmD    Verbal consent to manage patient's drug therapy was obtained from patient. They were informed they may decline to participate or withdraw from participation in pharmacy services at any time.

## 2025-01-20 PROCEDURE — RXMED WILLOW AMBULATORY MEDICATION CHARGE

## 2025-01-23 ENCOUNTER — PHARMACY VISIT (OUTPATIENT)
Dept: PHARMACY | Facility: CLINIC | Age: 65
End: 2025-01-23
Payer: COMMERCIAL

## 2025-02-18 DIAGNOSIS — D50.0 IRON DEFICIENCY ANEMIA DUE TO CHRONIC BLOOD LOSS: ICD-10-CM

## 2025-02-18 DIAGNOSIS — E11.59 TYPE 2 DIABETES MELLITUS WITH OTHER CIRCULATORY COMPLICATION, WITHOUT LONG-TERM CURRENT USE OF INSULIN: ICD-10-CM

## 2025-02-18 DIAGNOSIS — D64.9 ANEMIA, UNSPECIFIED TYPE: Primary | ICD-10-CM

## 2025-02-20 LAB
ALBUMIN/CREAT UR: NORMAL MG/G CREAT
CREAT UR-MCNC: 50 MG/DL (ref 20–275)
ERYTHROCYTE [DISTWIDTH] IN BLOOD BY AUTOMATED COUNT: 17 % (ref 11–15)
FERRITIN SERPL-MCNC: 7 NG/ML (ref 16–288)
HCT VFR BLD AUTO: 27.7 % (ref 35–45)
HGB BLD-MCNC: 7.8 G/DL (ref 11.7–15.5)
IRON SATN MFR SERPL: 4 % (CALC) (ref 16–45)
IRON SERPL-MCNC: 21 MCG/DL (ref 45–160)
MCH RBC QN AUTO: 20.6 PG (ref 27–33)
MCHC RBC AUTO-ENTMCNC: 28.2 G/DL (ref 32–36)
MCV RBC AUTO: 73.1 FL (ref 80–100)
MICROALBUMIN UR-MCNC: <0.2 MG/DL
PLATELET # BLD AUTO: 90 THOUSAND/UL (ref 140–400)
PMV BLD REES-ECKER: ABNORMAL FL
RBC # BLD AUTO: 3.79 MILLION/UL (ref 3.8–5.1)
TIBC SERPL-MCNC: 507 MCG/DL (CALC) (ref 250–450)
WBC # BLD AUTO: 4.8 THOUSAND/UL (ref 3.8–10.8)

## 2025-02-21 PROBLEM — D50.0 IRON DEFICIENCY ANEMIA DUE TO CHRONIC BLOOD LOSS: Status: ACTIVE | Noted: 2025-02-21

## 2025-02-21 RX ORDER — EPINEPHRINE 0.3 MG/.3ML
0.3 INJECTION SUBCUTANEOUS EVERY 5 MIN PRN
Status: CANCELLED | OUTPATIENT
Start: 2025-02-21

## 2025-02-21 RX ORDER — FAMOTIDINE 10 MG/ML
20 INJECTION, SOLUTION INTRAVENOUS ONCE AS NEEDED
Status: CANCELLED | OUTPATIENT
Start: 2025-02-21

## 2025-02-21 RX ORDER — DIPHENHYDRAMINE HYDROCHLORIDE 50 MG/ML
50 INJECTION INTRAMUSCULAR; INTRAVENOUS AS NEEDED
Status: CANCELLED | OUTPATIENT
Start: 2025-02-21

## 2025-02-21 RX ORDER — ALBUTEROL SULFATE 0.83 MG/ML
3 SOLUTION RESPIRATORY (INHALATION) AS NEEDED
Status: CANCELLED | OUTPATIENT
Start: 2025-02-21

## 2025-02-24 RX ORDER — EPINEPHRINE 0.3 MG/.3ML
0.3 INJECTION SUBCUTANEOUS EVERY 5 MIN PRN
OUTPATIENT
Start: 2025-02-24

## 2025-02-24 RX ORDER — DIPHENHYDRAMINE HYDROCHLORIDE 50 MG/ML
50 INJECTION INTRAMUSCULAR; INTRAVENOUS AS NEEDED
OUTPATIENT
Start: 2025-02-24

## 2025-02-24 RX ORDER — FAMOTIDINE 10 MG/ML
20 INJECTION, SOLUTION INTRAVENOUS ONCE AS NEEDED
OUTPATIENT
Start: 2025-02-24

## 2025-02-24 RX ORDER — ALBUTEROL SULFATE 0.83 MG/ML
3 SOLUTION RESPIRATORY (INHALATION) AS NEEDED
OUTPATIENT
Start: 2025-02-24

## 2025-03-05 ENCOUNTER — INFUSION (OUTPATIENT)
Dept: HEMATOLOGY/ONCOLOGY | Facility: CLINIC | Age: 65
End: 2025-03-05
Payer: COMMERCIAL

## 2025-03-05 VITALS
DIASTOLIC BLOOD PRESSURE: 75 MMHG | RESPIRATION RATE: 20 BRPM | BODY MASS INDEX: 33.99 KG/M2 | SYSTOLIC BLOOD PRESSURE: 117 MMHG | WEIGHT: 180 LBS | HEIGHT: 61 IN | TEMPERATURE: 98.2 F | HEART RATE: 82 BPM | OXYGEN SATURATION: 95 %

## 2025-03-05 DIAGNOSIS — D50.0 IRON DEFICIENCY ANEMIA DUE TO CHRONIC BLOOD LOSS: ICD-10-CM

## 2025-03-05 PROCEDURE — 2500000004 HC RX 250 GENERAL PHARMACY W/ HCPCS (ALT 636 FOR OP/ED): Mod: JZ | Performed by: PHYSICIAN ASSISTANT

## 2025-03-05 PROCEDURE — 96365 THER/PROPH/DIAG IV INF INIT: CPT | Mod: INF

## 2025-03-05 RX ORDER — EPINEPHRINE 0.3 MG/.3ML
0.3 INJECTION SUBCUTANEOUS EVERY 5 MIN PRN
OUTPATIENT
Start: 2025-03-05

## 2025-03-05 RX ORDER — DIPHENHYDRAMINE HYDROCHLORIDE 50 MG/ML
50 INJECTION INTRAMUSCULAR; INTRAVENOUS AS NEEDED
OUTPATIENT
Start: 2025-03-05

## 2025-03-05 RX ORDER — ALBUTEROL SULFATE 0.83 MG/ML
3 SOLUTION RESPIRATORY (INHALATION) AS NEEDED
OUTPATIENT
Start: 2025-03-05

## 2025-03-05 RX ORDER — FAMOTIDINE 10 MG/ML
20 INJECTION, SOLUTION INTRAVENOUS ONCE AS NEEDED
OUTPATIENT
Start: 2025-03-05

## 2025-03-05 RX ADMIN — FERUMOXYTOL 1020 MG: 510 INJECTION INTRAVENOUS at 11:00

## 2025-03-05 ASSESSMENT — PAIN SCALES - GENERAL: PAINLEVEL_OUTOF10: 7

## 2025-03-05 NOTE — PROGRESS NOTES
Pt given Feraheme  info. Patient tolerated Feraheme infusion without sign of adverse reaction.  Patient observed 30 minutes post infusion.   To follow up with doctor.  Discharged in stable condition.

## 2025-03-09 DIAGNOSIS — I10 PRIMARY HYPERTENSION: ICD-10-CM

## 2025-03-10 ENCOUNTER — LAB (OUTPATIENT)
Dept: LAB | Facility: HOSPITAL | Age: 65
End: 2025-03-10
Payer: COMMERCIAL

## 2025-03-10 ENCOUNTER — APPOINTMENT (OUTPATIENT)
Dept: LAB | Facility: HOSPITAL | Age: 65
End: 2025-03-10
Payer: COMMERCIAL

## 2025-03-10 DIAGNOSIS — D50.0 IRON DEFICIENCY ANEMIA DUE TO CHRONIC BLOOD LOSS: ICD-10-CM

## 2025-03-10 LAB
ERYTHROCYTE [DISTWIDTH] IN BLOOD BY AUTOMATED COUNT: 22.5 % (ref 11.5–14.5)
FERRITIN SERPL-MCNC: 697 NG/ML (ref 8–150)
HCT VFR BLD AUTO: 26.9 % (ref 36–46)
HGB BLD-MCNC: 8.1 G/DL (ref 12–16)
IRON SATN MFR SERPL: ABNORMAL %
IRON SERPL-MCNC: 619 UG/DL (ref 35–150)
MCH RBC QN AUTO: 21.6 PG (ref 26–34)
MCHC RBC AUTO-ENTMCNC: 30.1 G/DL (ref 32–36)
MCV RBC AUTO: 72 FL (ref 80–100)
NRBC BLD-RTO: 0.3 /100 WBCS (ref 0–0)
PLATELET # BLD AUTO: 56 X10*3/UL (ref 150–450)
RBC # BLD AUTO: 3.75 X10*6/UL (ref 4–5.2)
TIBC SERPL-MCNC: ABNORMAL UG/DL
UIBC SERPL-MCNC: <55 UG/DL (ref 110–370)
WBC # BLD AUTO: 6.2 X10*3/UL (ref 4.4–11.3)

## 2025-03-10 PROCEDURE — 82728 ASSAY OF FERRITIN: CPT

## 2025-03-10 PROCEDURE — 83550 IRON BINDING TEST: CPT

## 2025-03-10 PROCEDURE — 85027 COMPLETE CBC AUTOMATED: CPT

## 2025-03-10 PROCEDURE — 83540 ASSAY OF IRON: CPT

## 2025-03-10 RX ORDER — FOLIC ACID 1 MG/1
1 TABLET ORAL DAILY
Qty: 90 TABLET | Refills: 1 | Status: SHIPPED | OUTPATIENT
Start: 2025-03-10 | End: 2026-03-10

## 2025-03-13 ENCOUNTER — APPOINTMENT (OUTPATIENT)
Dept: PRIMARY CARE | Facility: CLINIC | Age: 65
End: 2025-03-13
Payer: COMMERCIAL

## 2025-03-13 VITALS
RESPIRATION RATE: 18 BRPM | HEIGHT: 61 IN | DIASTOLIC BLOOD PRESSURE: 74 MMHG | BODY MASS INDEX: 34.17 KG/M2 | TEMPERATURE: 98.4 F | OXYGEN SATURATION: 99 % | WEIGHT: 181 LBS | HEART RATE: 76 BPM | SYSTOLIC BLOOD PRESSURE: 118 MMHG

## 2025-03-13 DIAGNOSIS — D50.9 IRON DEFICIENCY ANEMIA, UNSPECIFIED IRON DEFICIENCY ANEMIA TYPE: Primary | ICD-10-CM

## 2025-03-13 DIAGNOSIS — E11.59 TYPE 2 DIABETES MELLITUS WITH OTHER CIRCULATORY COMPLICATION, WITHOUT LONG-TERM CURRENT USE OF INSULIN: ICD-10-CM

## 2025-03-13 LAB — POC HEMOGLOBIN A1C: 6.3 % (ref 4.2–6.5)

## 2025-03-13 PROCEDURE — 3074F SYST BP LT 130 MM HG: CPT | Performed by: PHYSICIAN ASSISTANT

## 2025-03-13 PROCEDURE — 3078F DIAST BP <80 MM HG: CPT | Performed by: PHYSICIAN ASSISTANT

## 2025-03-13 PROCEDURE — 4010F ACE/ARB THERAPY RXD/TAKEN: CPT | Performed by: PHYSICIAN ASSISTANT

## 2025-03-13 PROCEDURE — 99212 OFFICE O/P EST SF 10 MIN: CPT | Performed by: PHYSICIAN ASSISTANT

## 2025-03-13 PROCEDURE — 3008F BODY MASS INDEX DOCD: CPT | Performed by: PHYSICIAN ASSISTANT

## 2025-03-13 PROCEDURE — 1159F MED LIST DOCD IN RCRD: CPT | Performed by: PHYSICIAN ASSISTANT

## 2025-03-13 PROCEDURE — 83036 HEMOGLOBIN GLYCOSYLATED A1C: CPT | Performed by: PHYSICIAN ASSISTANT

## 2025-03-13 NOTE — PROGRESS NOTES
"Subjective   Patient ID: Daylin Tyler is a 64 y.o. female who presents for Follow-up (Pt here today for a follow up and to discuss lab work and get A1C in office. /), Skin Problem (States she is having cracked/dry skin on fingers and has tried things OTC but not helping. Then bumps it ten hurts. ), and Cough (Cough with some white mucus off/on past couple weeks).    HPI     Iron def anemia   - Her rheumatologist got labs and found she was severly anemic with Hgb of 7.9, iron24, TIBC 532, sat 4.5 (2/18/25). He told her to discuss these results with me  - Pt sent me [employer] message and I referred to GI for reval for possible GI blood loss. She is scheduled to see GI 4/2/25, likely will need scopes.   - I did also order Ferraheme infusion which she got done 3/5/25  - Repeat CBC 3/10/25 showed Hgb trended up to 8.1  - Pt reports already some improvement in energy and not eating as much ice/ less pica cravings      Dry skin by nails, skin cracking   Using topical medicines and they aren't helping   Tried neosporin, first aid cream, diabetic skin lotion, henry with a bandaid, liquid New Skin     Mild cough   - not every day just comes and goes   - A little mucous   - Doesn't feel sick   - Past couple weeks    Review of Systems   Constitutional:  Positive for fatigue.   Respiratory:  Positive for cough. Negative for shortness of breath and wheezing.    Skin:         Dry skin       Objective   /74   Pulse 76   Temp 36.9 °C (98.4 °F)   Resp 18   Ht 1.549 m (5' 1\")   Wt 82.1 kg (181 lb)   SpO2 99%   BMI 34.20 kg/m²     Physical Exam  Constitutional:       Appearance: Normal appearance.   Cardiovascular:      Rate and Rhythm: Normal rate and regular rhythm.      Pulses: Normal pulses.      Heart sounds: Normal heart sounds. No murmur heard.  Pulmonary:      Effort: Pulmonary effort is normal.      Breath sounds: Normal breath sounds.   Musculoskeletal:      Right lower leg: No edema.      Left lower leg: No edema. "   Neurological:      Mental Status: She is alert.   Psychiatric:         Mood and Affect: Mood and affect normal.       Assessment/Plan     Problem List Items Addressed This Visit       Type 2 diabetes mellitus with circulatory disorder, without long-term current use of insulin    Overview     - Most recent A1c was 6.3% (3/13/25) down from 6.9% prior   - Current meds: metformin  mg and Rybelsus 3 mg  - Most recent eGFR was 101 (2/17/25)  - Most recent UACr was negative (2/19/25)   - Pt is on ACE-I - lisinopril   - NOT on aspirin   - NOT on statin          Current Assessment & Plan     - Stable and well controlled  - Continue current tx plan           Relevant Orders    POCT glycosylated hemoglobin (Hb A1C) manually resulted (Completed)    Iron deficiency anemia - Primary    Overview     - Most recent labs: Hgb 8.1, MCV 72  - Tx: Ferraheme infusion 3/5/25  - Unclear cause at present - ?? chronic PPI         Current Assessment & Plan     - Appt with GI is scheduled for 4/2/25 to assess for GI blood loss   - If no source of chronic bleed is identified, the anemia may be secondary to chronic esomeprazole use for the past few years and resultant malabsorption of iron

## 2025-03-14 PROBLEM — D50.9 IRON DEFICIENCY ANEMIA: Status: ACTIVE | Noted: 2025-02-21

## 2025-03-14 ASSESSMENT — ENCOUNTER SYMPTOMS
FATIGUE: 1
COUGH: 1
WHEEZING: 0
SHORTNESS OF BREATH: 0
ROS SKIN COMMENTS: DRY SKIN

## 2025-03-14 NOTE — ASSESSMENT & PLAN NOTE
Specialty Pharmacy Patient Management Program  Reassessment     Sherin Azul is a 61 y.o. female with eczema and enrolled in the Patient Management program offered by [facility]. A follow-up outreach was conducted, including assessment of continued therapy appropriateness, medication adherence, and side effect incidence and management for skyrizi.     Changes to Insurance Coverage or Financial Support  none    Relevant Past Medical History and Comorbidities  Relevant medical history and concomitant health conditions were discussed with the patient. The patient's chart has been reviewed for relevant past medical history and comorbid health conditions and updated as necessary.   Past Medical History:   Diagnosis Date    Acrochordon     Acute maxillary sinusitis     Acute otitis media     Breast cancer 2005    left    Dermatitis     Drug therapy 2005    Eczema     H/O alcohol abuse     Herpes     History of breast cancer     History of cervical dysplasia     History of MRSA infection     Hx of radiation therapy 2005    Joint pain, hip     Joint pain, knee     Osteoarthritis     Psoriasis     Scabies     Tendonitis     Urinary tract infection     Varicose veins of legs     Viral syndrome     Weight gain      Social History     Socioeconomic History    Marital status:    Tobacco Use    Smoking status: Former     Average packs/day: 1 pack/day for 49.0 years (49.0 ttl pk-yrs)     Types: Cigarettes     Start date: 1975     Passive exposure: Current    Smokeless tobacco: Never   Vaping Use    Vaping status: Never Used   Substance and Sexual Activity    Alcohol use: No    Drug use: No    Sexual activity: Not Currently     Problem list reviewed by Alejandro Gant RPH on 1/24/2025 at 11:37 AM    Allergies  Known allergies and reactions were discussed with the patient. The patient's chart has been reviewed for allergy information and updated as necessary.   Allergies   Allergen  - Appt with GI is scheduled for 4/2/25 to assess for GI blood loss   - If no source of chronic bleed is identified, the anemia may be secondary to chronic esomeprazole use for the past few years and resultant malabsorption of iron    Reactions    Meloxicam Nausea Only     Allergies reviewed by Alejandro Gant RPH on 1/24/2025 at 11:37 AM    Relevant Laboratory Values  Lab Results   Component Value Date    GLUCOSE 84 08/09/2024    CALCIUM 10.0 08/09/2024     08/09/2024    K 4.9 08/09/2024    CO2 26.1 08/09/2024     08/09/2024    BUN 10 08/09/2024    CREATININE 0.72 08/09/2024    BCR 13.9 08/09/2024    ANIONGAP 7.9 08/09/2024     Lab Results   Component Value Date    WBC 9.05 08/09/2024    HGB 15.3 08/09/2024    HCT 45.0 08/09/2024    MCV 95.1 08/09/2024     08/09/2024     Lab Value Review  The above lab values have been reviewed; the following specialty medication(s) dose adjustment(s) are recommended: none.    Current Medication List  This medication list has been reviewed with the patient and evaluated for any interactions or necessary modifications/recommendations, and updated to include all prescription medications, OTC medications, and supplements the patient is currently taking. This list reflects what is contained in the patient's profile, which has also been marked as reviewed to communicate to other providers it is the most up to date version of the patient's current medication therapy.     Current Outpatient Medications:     HYDROcodone-acetaminophen (NORCO)  MG per tablet, Take 2 tablets by mouth Every 12 (Twelve) Hours As Needed., Disp: , Rfl:     omeprazole (priLOSEC) 40 MG capsule, Take 1 capsule by mouth Daily as directed for GERD, Disp: 90 capsule, Rfl: 3    Risankizumab-rzaa (Skyrizi Pen) 150 MG/ML solution auto-injector, Inject 150 mg under the skin into the appropriate area as directed Every 3 (Three) Months., Disp: 1 mL, Rfl: 0  Medicines reviewed by Alejandro Gant RPH on 1/24/2025 at 11:37 AM    Drug Interactions  none     Adverse Drug Reactions  Medication tolerability: Tolerating with no to minimal ADRs  Medication plan: Continue therapy with normal follow-up  Plan for ADR Management:  none    Hospitalizations and Urgent Care Since Last Assessment  Hospitalizations or Admissions: none  ED Visits: none  Urgent Office Visits: none     Adherence, Self-Administration, and Current Therapy Problems  Adherence related to the patient's specialty therapy was discussed with the patient. The Adherence segment of this outreach has been reviewed and updated.     Adherence Questions  Linked Medication(s) Assessed: Risankizumab-rzaa (Skyrizi Pen)  On average, how many doses/injections does the patient miss per month?: 0  What are the identified reasons for non-adherence or missed doses? : no problems identified  What is the estimated medication adherence level?: %  Based on the patient/caregiver response and refill history, does this patient require an MTP to track adherence improvements?: no    Additional Barriers to Patient Self-Administration: none  Methods for Supporting Patient Self-Administration: none    Open Medication Therapy Problems  No medication therapy recommendations to display    Goals of Therapy  Goals related to the patient's specialty therapy were discussed with the patient. The Patient Goals segment of this outreach has been reviewed and updated.   Goals Addressed Today        Specialty Pharmacy General Goal      A reduction in BSA of skin lesions on the body.                Progress Toward Meeting Patient-Identified Goals of Therapy: On Track  New Patient-Identified Goals, If Applicable:     Progress Toward Meeting Clinical Goals or Therapeutic Targets: On Track  New Clinical Goals or Therapeutic Targets, If Applicable:     Quality of Life Assessment   Quality of Life related to the patient's enrollment in the patient management program and services provided was discussed with the patient. The QOL segment of this outreach has been reviewed and updated.  Quality of Life Improvement Scale: 9-A good deal better    Reassessment Plan & Follow-Up  Medication Therapy Changes: none  Additional  Plans, Therapy Recommendations, or Therapy Problems to Be Addressed: none   Pharmacist to perform regular reassessments no more than (6) months from the previous assessment.  Care Coordinator to set up future refill outreaches, coordinate prescription delivery, and escalate clinical questions to pharmacist.     Attestation  I attest the patient was actively involved in and has agreed to the above plan of care. I attest that the specialty medication(s) addressed above are appropriate for the patient based on my reassessment. If the prescribed therapy is at any point deemed not appropriate based on the current or future assessments, a consultation will be initiated with the patient's specialty care provider to determine the best course of action. The revised plan of therapy will be documented along with any required assessments and/or additional patient education provided.     Electronically signed by Alejandro Gant RPH, 01/24/25, 11:38 AM EST.

## 2025-03-31 PROCEDURE — RXMED WILLOW AMBULATORY MEDICATION CHARGE

## 2025-04-02 ENCOUNTER — APPOINTMENT (OUTPATIENT)
Dept: GASTROENTEROLOGY | Facility: CLINIC | Age: 65
End: 2025-04-02
Payer: COMMERCIAL

## 2025-04-02 VITALS
BODY MASS INDEX: 35.53 KG/M2 | OXYGEN SATURATION: 96 % | DIASTOLIC BLOOD PRESSURE: 88 MMHG | SYSTOLIC BLOOD PRESSURE: 159 MMHG | HEIGHT: 60 IN | HEART RATE: 73 BPM | WEIGHT: 181 LBS

## 2025-04-02 DIAGNOSIS — D50.0 IRON DEFICIENCY ANEMIA DUE TO CHRONIC BLOOD LOSS: Primary | ICD-10-CM

## 2025-04-02 DIAGNOSIS — E11.59 TYPE 2 DIABETES MELLITUS WITH OTHER CIRCULATORY COMPLICATION, WITHOUT LONG-TERM CURRENT USE OF INSULIN: ICD-10-CM

## 2025-04-02 DIAGNOSIS — R74.8 ALKALINE PHOSPHATASE ELEVATION: ICD-10-CM

## 2025-04-02 DIAGNOSIS — D69.6 THROMBOCYTOPENIA (CMS-HCC): ICD-10-CM

## 2025-04-02 PROCEDURE — 4010F ACE/ARB THERAPY RXD/TAKEN: CPT | Performed by: STUDENT IN AN ORGANIZED HEALTH CARE EDUCATION/TRAINING PROGRAM

## 2025-04-02 PROCEDURE — 3079F DIAST BP 80-89 MM HG: CPT | Performed by: STUDENT IN AN ORGANIZED HEALTH CARE EDUCATION/TRAINING PROGRAM

## 2025-04-02 PROCEDURE — 1036F TOBACCO NON-USER: CPT | Performed by: STUDENT IN AN ORGANIZED HEALTH CARE EDUCATION/TRAINING PROGRAM

## 2025-04-02 PROCEDURE — 99205 OFFICE O/P NEW HI 60 MIN: CPT | Performed by: STUDENT IN AN ORGANIZED HEALTH CARE EDUCATION/TRAINING PROGRAM

## 2025-04-02 PROCEDURE — 3077F SYST BP >= 140 MM HG: CPT | Performed by: STUDENT IN AN ORGANIZED HEALTH CARE EDUCATION/TRAINING PROGRAM

## 2025-04-02 PROCEDURE — 3008F BODY MASS INDEX DOCD: CPT | Performed by: STUDENT IN AN ORGANIZED HEALTH CARE EDUCATION/TRAINING PROGRAM

## 2025-04-02 PROCEDURE — 1159F MED LIST DOCD IN RCRD: CPT | Performed by: STUDENT IN AN ORGANIZED HEALTH CARE EDUCATION/TRAINING PROGRAM

## 2025-04-02 RX ORDER — POLYETHYLENE GLYCOL 3350, SODIUM SULFATE ANHYDROUS, SODIUM BICARBONATE, SODIUM CHLORIDE, POTASSIUM CHLORIDE 236; 22.74; 6.74; 5.86; 2.97 G/4L; G/4L; G/4L; G/4L; G/4L
4 POWDER, FOR SOLUTION ORAL ONCE
Qty: 4000 ML | Refills: 0 | Status: SHIPPED | OUTPATIENT
Start: 2025-04-02 | End: 2025-04-02

## 2025-04-02 NOTE — PROGRESS NOTES
Subjective     History of Present Illness:   Daylin Tyler is a 64 y.o. female with hx of SLE, GERD, FM, T2DM (A2c of 6.7) who presents to GI clinic for evaluation of JESENIA.    Patient states she was in her normal state of health up until last month when she was noted to have worsening anemia to noel of 8.1 from previous baseline of 12 in 2023.  With iron sat of 4% and ferritin of 7.    She denies any issues with blood in her stool, abdominal pain, nausea or vomiting.  She denies any family history of GI malignancy.    Patient completed 1 session of iron infusion with improvement in her ferritin now to 600.      Past Medical History   has a past medical history of Encounter for examination of eyes and vision without abnormal findings and Systemic lupus erythematosus, unspecified.    She has no past medical history of Personal history of irradiation.     Social History   reports that she has never smoked. She has never used smokeless tobacco. She reports current alcohol use. She reports that she does not use drugs.     Family History  family history includes Diabetes in her father; Glaucoma in her father and mother; Hypertension in her mother; sarcoma in her father.     Allergies  Allergies   Allergen Reactions    Metformin GI Upset     Side effects stopped when medication was discontinued    Sulfamethoxazole Itching       Medications  Current Outpatient Medications   Medication Instructions    Benlysta 200 mg/mL injection 1 mL, Once Weekly    esomeprazole (NEXIUM) 40 mg, oral, Daily    fluticasone (Flonase) 50 mcg/actuation nasal spray 1 spray, Each Nostril, Daily, Shake gently. Before first use, prime pump. After use, clean tip and replace cap.    folic acid (FOLVITE) 1 mg, oral, Daily    lisinopril 30 mg, oral, Daily    PlaqueniL 200 mg, 2 times daily    polyethylene glycol (Golytely) 236-22.74-6.74 -5.86 gram solution 4,000 mL, oral, Once    pregabalin (LYRICA) 150 mg, 3 times daily PRN    Rybelsus 7 mg, oral,  Daily        Objective   Visit Vitals  /88   Pulse 73      Physical Exam  Constitutional:       General: She is not in acute distress.     Appearance: Normal appearance.   HENT:      Head: Normocephalic and atraumatic.      Mouth/Throat:      Mouth: Mucous membranes are moist.   Eyes:      Extraocular Movements: Extraocular movements intact.   Pulmonary:      Effort: Pulmonary effort is normal.      Breath sounds: No stridor. No wheezing.   Abdominal:      General: Abdomen is flat. There is no distension.   Musculoskeletal:         General: Normal range of motion.   Skin:     General: Skin is warm and dry.   Neurological:      General: No focal deficit present.      Mental Status: She is alert.   Psychiatric:         Mood and Affect: Mood normal.         Judgment: Judgment normal.         Assessment/Plan   Daylin Tyler is a 64 y.o. female with hx of SLE, GERD, FM, T2DM (A2c of 6.7) who presents to GI clinic for evaluation of JESENIA.    Suprisingly has had an overly robust response to one session of iron infusion with Ferritin increase to ?600. She remains anemic despite this to ~ 8. We will plan to repeat studies today to evaluate discrepancy. Will complete nutritional workup with folate level. Will plan on bidirectional endoscopy.     With regards to her thrombocytopenia she warrants US evaluation for cirrhosis and splenomegaly. Will repeat liver testing today. . Prior US in 2023 with diffuse fatty infiltration.  Prior testing for viral hepatitis was negative in 2024    Problem List Items Addressed This Visit       Alkaline phosphatase elevation    Relevant Orders    Folate    CBC and Auto Differential    Ferritin    Iron and TIBC    Hepatic function panel    US abdomen complete    Type 2 diabetes mellitus with circulatory disorder, without long-term current use of insulin    Thrombocytopenia (CMS-HCC)    Iron deficiency anemia - Primary    Relevant Orders    Colonoscopy Diagnostic (JESENIA)     Esophagogastroduodenoscopy (EGD)    Folate    CBC and Auto Differential    Ferritin    Iron and TIBC    Hepatic function panel              Isela Man MD         My final recommendations will be communicated back to the requesting physician by way of shared Medical record or letter to requesting physician via fax.

## 2025-04-03 ENCOUNTER — PHARMACY VISIT (OUTPATIENT)
Dept: PHARMACY | Facility: CLINIC | Age: 65
End: 2025-04-03
Payer: COMMERCIAL

## 2025-04-10 ENCOUNTER — HOSPITAL ENCOUNTER (OUTPATIENT)
Dept: RADIOLOGY | Facility: HOSPITAL | Age: 65
Discharge: HOME | End: 2025-04-10
Payer: COMMERCIAL

## 2025-04-10 DIAGNOSIS — R74.8 ALKALINE PHOSPHATASE ELEVATION: ICD-10-CM

## 2025-04-10 LAB
ALBUMIN SERPL-MCNC: 3.5 G/DL (ref 3.6–5.1)
ALBUMIN/GLOB SERPL: 1.4 (CALC) (ref 1–2.5)
ALP SERPL-CCNC: 190 U/L (ref 37–153)
ALT SERPL-CCNC: 42 U/L (ref 6–29)
AST SERPL-CCNC: 59 U/L (ref 10–35)
BASOPHILS # BLD AUTO: 30 CELLS/UL (ref 0–200)
BASOPHILS NFR BLD AUTO: 0.8 %
BILIRUB DIRECT SERPL-MCNC: 0.4 MG/DL
BILIRUB INDIRECT SERPL-MCNC: 0.6 MG/DL (CALC) (ref 0.2–1.2)
BILIRUB SERPL-MCNC: 1 MG/DL (ref 0.2–1.2)
EOSINOPHIL # BLD AUTO: 220 CELLS/UL (ref 15–500)
EOSINOPHIL NFR BLD AUTO: 5.8 %
ERYTHROCYTE [DISTWIDTH] IN BLOOD BY AUTOMATED COUNT: ABNORMAL %
FERRITIN SERPL-MCNC: 77 NG/ML (ref 16–288)
FOLATE SERPL-MCNC: 23.1 NG/ML
GLOBULIN SER CALC-MCNC: 2.5 G/DL (CALC) (ref 1.9–3.7)
HCT VFR BLD AUTO: 35.3 % (ref 35–45)
HGB BLD-MCNC: 10.9 G/DL (ref 11.7–15.5)
IRON SATN MFR SERPL: 34 % (CALC) (ref 16–45)
IRON SERPL-MCNC: 113 MCG/DL (ref 45–160)
LYMPHOCYTES # BLD AUTO: 559 CELLS/UL (ref 850–3900)
LYMPHOCYTES NFR BLD AUTO: 14.7 %
MCH RBC QN AUTO: 25.6 PG (ref 27–33)
MCHC RBC AUTO-ENTMCNC: 30.9 G/DL (ref 32–36)
MCV RBC AUTO: 83.1 FL (ref 80–100)
MONOCYTES # BLD AUTO: 270 CELLS/UL (ref 200–950)
MONOCYTES NFR BLD AUTO: 7.1 %
NEUTROPHILS # BLD AUTO: 2721 CELLS/UL (ref 1500–7800)
NEUTROPHILS NFR BLD AUTO: 71.6 %
PLATELET # BLD AUTO: 68 THOUSAND/UL (ref 140–400)
PMV BLD REES-ECKER: ABNORMAL FL
PROT SERPL-MCNC: 6 G/DL (ref 6.1–8.1)
RBC # BLD AUTO: 4.25 MILLION/UL (ref 3.8–5.1)
SERVICE CMNT-IMP: ABNORMAL
TIBC SERPL-MCNC: 336 MCG/DL (CALC) (ref 250–450)
WBC # BLD AUTO: 3.8 THOUSAND/UL (ref 3.8–10.8)

## 2025-04-10 PROCEDURE — 76700 US EXAM ABDOM COMPLETE: CPT

## 2025-04-15 ENCOUNTER — ANESTHESIA EVENT (OUTPATIENT)
Dept: GASTROENTEROLOGY | Facility: EXTERNAL LOCATION | Age: 65
End: 2025-04-15

## 2025-04-15 ENCOUNTER — APPOINTMENT (OUTPATIENT)
Dept: PHARMACY | Facility: HOSPITAL | Age: 65
End: 2025-04-15
Payer: COMMERCIAL

## 2025-04-15 DIAGNOSIS — E11.9 TYPE 2 DIABETES MELLITUS WITHOUT COMPLICATION, WITHOUT LONG-TERM CURRENT USE OF INSULIN: ICD-10-CM

## 2025-04-15 DIAGNOSIS — E11.59 TYPE 2 DIABETES MELLITUS WITH OTHER CIRCULATORY COMPLICATION, WITHOUT LONG-TERM CURRENT USE OF INSULIN: ICD-10-CM

## 2025-04-15 NOTE — PROGRESS NOTES
Pharmacist Clinic: ARLETH Goins was referred to the Clinical Pharmacy Team for financial assistance with Rybelsus.    Referring Provider: Regina Romo PA-C    Problem List Items Addressed This Visit       Type 2 diabetes mellitus with circulatory disorder, without long-term current use of insulin     Other Visit Diagnoses       Type 2 diabetes mellitus without complication, without long-term current use of insulin              HISTORY OF PRESENT ILLNESS  Spoke with patient today regarding diabetes management and financial assistance with Rybelsus. Patient reports she has been taking Rybelsus 7 mg daily. Denies any side effects or concerns with therapy. A1c remains at goal. Patient reports feeling much better since iron infusion, and has upcoming colonoscopy. Aware she will need to hold Rybelsus prior to procedure.    Patient was approved for  PAP for Rybelsus until 3/2026.     MEDICATION ASSESSMENT    Allergies: Metformin and Sulfamethoxazole     [id number] - [place], [place] - [address]  [address] [facility] [address]  Phone: [phone] Fax: [phone]    [facility]  [address], [address]  [facility] [address]  Phone: [phone] Fax: [phone]    LAB  Lab Results   Component Value Date    BILITOT 1.0 04/09/2025    CALCIUM 8.9 07/03/2024    CO2 28 07/03/2024     07/03/2024    CREATININE 0.49 (L) 07/03/2024    GLUCOSE 185 (H) 07/03/2024    ALKPHOS 190 (H) 04/09/2025    K 4.8 07/03/2024    PROT 6.0 (L) 04/09/2025     07/03/2024    AST 59 (H) 04/09/2025    ALT 42 (H) 04/09/2025    BUN 8 07/03/2024    ANIONGAP 11 07/03/2024    ALBUMIN 3.5 (L) 04/09/2025    LIPASE 35 03/13/2020    EGFR >90 07/03/2024     Lab Results   Component Value Date    TRIG 122 07/03/2024    CHOL 155 07/03/2024    LDLCALC 66 07/03/2024    HDL 64.6 07/03/2024     Lab Results   Component Value Date    HGBA1C 6.3 03/13/2025     DRUG INTERACTIONS  - No significant  drug-drug interactions exist that require adjustment to therapy    CURRENT PHARMACOTHERAPY  - Rybelsus 7 mg daily    PATIENT EDUCATION/DISCUSSION  - Will plan to continue current therapy today  - Counseled patient on MOA, expectations, side effects, duration of therapy, contraindications, administration, and monitoring parameters  - Answered all patient questions and concerns; provided [facility] phone number if issues/questions arise    RECOMMENDATIONS/PLAN  1. Continue: Rybelsus 7 mg daily.   2. Continue: Continue all meds under the continuation of care with the referring provider and clinical pharmacy team.    Clinical Pharmacist follow up: 2/3/26 @ 12:40 pm (patient's lunch break)  Type of Encounter: Virtual    Thank you,  Apoorva Koch, PharmD    Verbal consent to manage patient's drug therapy was obtained from patient. They were informed they may decline to participate or withdraw from participation in pharmacy services at any time.

## 2025-04-28 RX ORDER — ONDANSETRON HYDROCHLORIDE 2 MG/ML
4 INJECTION, SOLUTION INTRAVENOUS ONCE AS NEEDED
Status: CANCELLED | OUTPATIENT
Start: 2025-04-28

## 2025-04-29 ENCOUNTER — APPOINTMENT (OUTPATIENT)
Dept: GASTROENTEROLOGY | Facility: EXTERNAL LOCATION | Age: 65
End: 2025-04-29
Payer: COMMERCIAL

## 2025-04-29 ENCOUNTER — ANESTHESIA (OUTPATIENT)
Dept: GASTROENTEROLOGY | Facility: EXTERNAL LOCATION | Age: 65
End: 2025-04-29

## 2025-04-29 VITALS
HEIGHT: 61 IN | TEMPERATURE: 97.3 F | SYSTOLIC BLOOD PRESSURE: 152 MMHG | BODY MASS INDEX: 32.85 KG/M2 | DIASTOLIC BLOOD PRESSURE: 70 MMHG | HEART RATE: 65 BPM | RESPIRATION RATE: 17 BRPM | OXYGEN SATURATION: 97 % | WEIGHT: 174 LBS

## 2025-04-29 DIAGNOSIS — D50.9 IRON DEFICIENCY ANEMIA, UNSPECIFIED IRON DEFICIENCY ANEMIA TYPE: ICD-10-CM

## 2025-04-29 DIAGNOSIS — D50.0 IRON DEFICIENCY ANEMIA DUE TO CHRONIC BLOOD LOSS: ICD-10-CM

## 2025-04-29 DIAGNOSIS — I85.00 ESOPHAGEAL VARICES WITHOUT BLEEDING, UNSPECIFIED ESOPHAGEAL VARICES TYPE (MULTI): Primary | ICD-10-CM

## 2025-04-29 PROCEDURE — 45378 DIAGNOSTIC COLONOSCOPY: CPT | Performed by: STUDENT IN AN ORGANIZED HEALTH CARE EDUCATION/TRAINING PROGRAM

## 2025-04-29 PROCEDURE — 43239 EGD BIOPSY SINGLE/MULTIPLE: CPT | Performed by: STUDENT IN AN ORGANIZED HEALTH CARE EDUCATION/TRAINING PROGRAM

## 2025-04-29 RX ORDER — CARVEDILOL 6.25 MG/1
6.25 TABLET ORAL 2 TIMES DAILY
Qty: 60 TABLET | Refills: 3 | Status: SHIPPED | OUTPATIENT
Start: 2025-04-29 | End: 2025-04-29

## 2025-04-29 RX ORDER — LIDOCAINE HYDROCHLORIDE 20 MG/ML
INJECTION, SOLUTION INFILTRATION; PERINEURAL AS NEEDED
Status: DISCONTINUED | OUTPATIENT
Start: 2025-04-29 | End: 2025-04-29

## 2025-04-29 RX ORDER — FERROUS SULFATE 325(65) MG
325 TABLET, DELAYED RELEASE (ENTERIC COATED) ORAL
Qty: 60 TABLET | Refills: 3 | Status: SHIPPED | OUTPATIENT
Start: 2025-04-29

## 2025-04-29 RX ORDER — PROPOFOL 10 MG/ML
INJECTION, EMULSION INTRAVENOUS AS NEEDED
Status: DISCONTINUED | OUTPATIENT
Start: 2025-04-29 | End: 2025-04-29

## 2025-04-29 RX ORDER — SODIUM CHLORIDE 9 MG/ML
INJECTION, SOLUTION INTRAVENOUS CONTINUOUS PRN
Status: DISCONTINUED | OUTPATIENT
Start: 2025-04-29 | End: 2025-04-29

## 2025-04-29 RX ORDER — CARVEDILOL 6.25 MG/1
6.25 TABLET ORAL NIGHTLY
Qty: 90 TABLET | Refills: 3 | Status: SHIPPED | OUTPATIENT
Start: 2025-04-29

## 2025-04-29 RX ADMIN — LIDOCAINE HYDROCHLORIDE 3 ML: 20 INJECTION, SOLUTION INFILTRATION; PERINEURAL at 12:22

## 2025-04-29 RX ADMIN — PROPOFOL 30 MG: 10 INJECTION, EMULSION INTRAVENOUS at 12:44

## 2025-04-29 RX ADMIN — PROPOFOL 50 MG: 10 INJECTION, EMULSION INTRAVENOUS at 12:32

## 2025-04-29 RX ADMIN — PROPOFOL 100 MG: 10 INJECTION, EMULSION INTRAVENOUS at 12:22

## 2025-04-29 RX ADMIN — PROPOFOL 50 MG: 10 INJECTION, EMULSION INTRAVENOUS at 12:28

## 2025-04-29 RX ADMIN — PROPOFOL 50 MG: 10 INJECTION, EMULSION INTRAVENOUS at 12:39

## 2025-04-29 RX ADMIN — SODIUM CHLORIDE: 9 INJECTION, SOLUTION INTRAVENOUS at 12:12

## 2025-04-29 RX ADMIN — PROPOFOL 100 MG: 10 INJECTION, EMULSION INTRAVENOUS at 12:24

## 2025-04-29 SDOH — HEALTH STABILITY: MENTAL HEALTH: CURRENT SMOKER: 0

## 2025-04-29 ASSESSMENT — PAIN - FUNCTIONAL ASSESSMENT
PAIN_FUNCTIONAL_ASSESSMENT: 0-10

## 2025-04-29 ASSESSMENT — PAIN SCALES - GENERAL
PAINLEVEL_OUTOF10: 0 - NO PAIN
PAIN_LEVEL: 0
PAINLEVEL_OUTOF10: 0 - NO PAIN
PAINLEVEL_OUTOF10: 0 - NO PAIN

## 2025-04-29 ASSESSMENT — COLUMBIA-SUICIDE SEVERITY RATING SCALE - C-SSRS
6. HAVE YOU EVER DONE ANYTHING, STARTED TO DO ANYTHING, OR PREPARED TO DO ANYTHING TO END YOUR LIFE?: NO
2. HAVE YOU ACTUALLY HAD ANY THOUGHTS OF KILLING YOURSELF?: NO
1. IN THE PAST MONTH, HAVE YOU WISHED YOU WERE DEAD OR WISHED YOU COULD GO TO SLEEP AND NOT WAKE UP?: NO

## 2025-04-29 NOTE — ANESTHESIA POSTPROCEDURE EVALUATION
Patient: Daylin Tyler    Procedure Summary       Date: 04/29/25 Room / Location: [facility]    Anesthesia Start: 1219 Anesthesia Stop: 1252    Procedures:       COLONOSCOPY      EGD Diagnosis: Iron deficiency anemia due to chronic blood loss    Scheduled Providers: Isela Man MD; Korin Eldridge RN Responsible Provider: MATT Joseph    Anesthesia Type: MAC ASA Status: 3            Anesthesia Type: MAC    Vitals Value Taken Time   /58 04/29/25 12:49   Temp 36.3 °C (97.3 °F) 04/29/25 12:49   Pulse 67 04/29/25 12:49   Resp 3 04/29/25 12:49   SpO2 98 % 04/29/25 12:49       Anesthesia Post Evaluation    Patient location during evaluation: bedside  Patient participation: complete - patient participated  Level of consciousness: awake  Pain score: 0  Pain management: adequate  Airway patency: patent  Cardiovascular status: acceptable  Respiratory status: acceptable  Hydration status: acceptable  Postoperative Nausea and Vomiting: none        There were no known notable events for this encounter.

## 2025-04-29 NOTE — ANESTHESIA PREPROCEDURE EVALUATION
Patient: Daylin Tyler    Procedure Information       Date/Time: 25 1230    Scheduled providers: Isela Man MD; Korin Eldridge RN    Procedures:       COLONOSCOPY      EGD    Location: [facility]            Relevant Problems   Cardiac   (+) Hypertension associated with type 2 diabetes mellitus      GI   (+) GERD (gastroesophageal reflux disease)      Endocrine   (+) Class 2 severe obesity due to excess calories with serious comorbidity and body mass index (BMI) of 37.0 to 37.9 in adult   (+) Type 2 diabetes mellitus with circulatory disorder, without long-term current use of insulin      Hematology   (+) Iron deficiency anemia   (+) Thrombocytopenia (CMS-HCC)      Musculoskeletal   (+) Fibromyalgia      ID   (+) Tinea versicolor      Skin   (+) Tinea versicolor       Clinical information reviewed:   Tobacco  Allergies  Meds   Med Hx  Surg Hx  OB Status  Fam Hx  Soc   Hx        NPO Detail:  NPO/Void Status  Date of Last Liquid: 25  Time of Last Liquid: 0900  Date of Last Solid: 25         Physical Exam    Airway  Mallampati: III  TM distance: >3 FB  Neck ROM: full  Mouth openin finger widths     Cardiovascular - normal exam  Rhythm: regular  Rate: normal     Dental - normal exam     Pulmonary - normal examBreath sounds clear to auscultation     Abdominal - normal exam(+) obese  Abdomen: soft             Anesthesia Plan    History of general anesthesia?: yes  History of complications of general anesthesia?: no    ASA 3     MAC     The patient is not a current smoker.    intravenous induction   Anesthetic plan and risks discussed with patient.    Plan discussed with CRNA.

## 2025-04-29 NOTE — H&P
Procedure H&P    Patient Profile-Procedures  Name Daylin Tyler  Date of Birth 1960  MRN [id number]  Address   [address] [place] [id number] [place] [address]    Primary Phone Number [phone]  Secondary Phone Number    Regina Zayas    Procedure(s):  Procedures: EGD and Colonoscopy  Primary contact name and number   Extended Emergency Contact Information  Primary Emergency Contact: Bj Tyler  Home Phone: [phone]  Relation: Spouse    General Health  Weight There were no vitals filed for this visit.  BMI There is no height or weight on file to calculate BMI.    Allergies  RX Allergies[1]    Past Medical History   Medical History[2]    Provider assessment  Diagnosis: Anemia  Medication Reviewed - yes  Prior to Admission medications    Medication Sig Start Date End Date Taking? Authorizing Provider   Benlysta 200 mg/mL injection Inject 1 mL (200 mg) under the skin 1 (one) time per week.    Historical Provider, MD   esomeprazole (NexIUM) 40 mg DR capsule Take 1 capsule (40 mg) by mouth once daily. 10/9/23   Regina Romo PA-C   fluticasone (Flonase) 50 mcg/actuation nasal spray Administer 1 spray into each nostril once daily. Shake gently. Before first use, prime pump. After use, clean tip and replace cap. 1/7/25 1/7/26  Susana Grimm PA-C   folic acid (Folvite) 1 mg tablet Take 1 tablet (1 mg) by mouth once daily. 3/10/25 3/10/26  Regina Romo PA-C   lisinopril 30 mg tablet Take 1 tablet (30 mg) by mouth once daily. 12/2/24   Durga Villareal DO   PlaqueniL 200 mg tablet Take 1 tablet (200 mg) by mouth twice a day. With food    Historical Provider, MD   pregabalin (Lyrica) 150 mg capsule Take 1 capsule (150 mg) by mouth 3 times a day as needed.    Historical Provider, MD   semaglutide (Rybelsus) 7 mg tablet Take 1 tablet (7 mg) by mouth once daily. 4/15/25   Regina oRmo PA-C       Physical Exam  There were no vitals filed for this visit.     General: A&Ox3,  NAD.  HEENT: AT/NC.   CV: RRR. No murmur.  Resp: CTA bilaterally. No wheezing, rhonchi or rales.   GI: Soft, NT/ND. BSx4.  Extrem: No edema. Pulses intact.  Neuro: No focal deficits.   Psych: Normal mood and affect.      Procedure Plan - pre-procedural (re)assesment completed by physician:  discharge/transfer patient when discharge criteria met    Isela Man MD  4/29/2025 11:50 AM           [1]   Allergies  Allergen Reactions    Metformin GI Upset     Side effects stopped when medication was discontinued    Sulfamethoxazole Itching   [2]   Past Medical History:  Diagnosis Date    Encounter for examination of eyes and vision without abnormal findings     Encounter for eye exam    Systemic lupus erythematosus, unspecified     History of systemic lupus erythematosus (SLE)

## 2025-04-29 NOTE — DISCHARGE INSTRUCTIONS

## 2025-05-02 ENCOUNTER — APPOINTMENT (OUTPATIENT)
Dept: PRIMARY CARE | Facility: CLINIC | Age: 65
End: 2025-05-02
Payer: COMMERCIAL

## 2025-05-09 LAB
LAB AP ASR DISCLAIMER: NORMAL
LABORATORY COMMENT REPORT: NORMAL
PATH REPORT.FINAL DX SPEC: NORMAL
PATH REPORT.GROSS SPEC: NORMAL
PATH REPORT.TOTAL CANCER: NORMAL

## 2025-05-13 ENCOUNTER — APPOINTMENT (OUTPATIENT)
Dept: PRIMARY CARE | Facility: CLINIC | Age: 65
End: 2025-05-13
Payer: COMMERCIAL

## 2025-06-09 ENCOUNTER — APPOINTMENT (OUTPATIENT)
Dept: GASTROENTEROLOGY | Facility: CLINIC | Age: 65
End: 2025-06-09
Payer: COMMERCIAL

## 2025-06-09 VITALS
HEIGHT: 61 IN | WEIGHT: 179 LBS | OXYGEN SATURATION: 95 % | SYSTOLIC BLOOD PRESSURE: 167 MMHG | DIASTOLIC BLOOD PRESSURE: 85 MMHG | BODY MASS INDEX: 33.79 KG/M2 | HEART RATE: 69 BPM

## 2025-06-09 DIAGNOSIS — R74.8 ALKALINE PHOSPHATASE ELEVATION: ICD-10-CM

## 2025-06-09 DIAGNOSIS — K74.60 CIRRHOSIS OF LIVER WITHOUT ASCITES, UNSPECIFIED HEPATIC CIRRHOSIS TYPE (MULTI): Primary | ICD-10-CM

## 2025-06-09 PROCEDURE — 99215 OFFICE O/P EST HI 40 MIN: CPT | Performed by: STUDENT IN AN ORGANIZED HEALTH CARE EDUCATION/TRAINING PROGRAM

## 2025-06-09 PROCEDURE — 3008F BODY MASS INDEX DOCD: CPT | Performed by: STUDENT IN AN ORGANIZED HEALTH CARE EDUCATION/TRAINING PROGRAM

## 2025-06-09 PROCEDURE — 1159F MED LIST DOCD IN RCRD: CPT | Performed by: STUDENT IN AN ORGANIZED HEALTH CARE EDUCATION/TRAINING PROGRAM

## 2025-06-09 PROCEDURE — 4010F ACE/ARB THERAPY RXD/TAKEN: CPT | Performed by: STUDENT IN AN ORGANIZED HEALTH CARE EDUCATION/TRAINING PROGRAM

## 2025-06-09 PROCEDURE — 1160F RVW MEDS BY RX/DR IN RCRD: CPT | Performed by: STUDENT IN AN ORGANIZED HEALTH CARE EDUCATION/TRAINING PROGRAM

## 2025-06-09 PROCEDURE — 3077F SYST BP >= 140 MM HG: CPT | Performed by: STUDENT IN AN ORGANIZED HEALTH CARE EDUCATION/TRAINING PROGRAM

## 2025-06-09 PROCEDURE — 1036F TOBACCO NON-USER: CPT | Performed by: STUDENT IN AN ORGANIZED HEALTH CARE EDUCATION/TRAINING PROGRAM

## 2025-06-09 PROCEDURE — 3044F HG A1C LEVEL LT 7.0%: CPT | Performed by: STUDENT IN AN ORGANIZED HEALTH CARE EDUCATION/TRAINING PROGRAM

## 2025-06-09 PROCEDURE — 3079F DIAST BP 80-89 MM HG: CPT | Performed by: STUDENT IN AN ORGANIZED HEALTH CARE EDUCATION/TRAINING PROGRAM

## 2025-06-09 NOTE — PROGRESS NOTES
"Subjective     History of Present Illness:   Daylin Tyler is a 64 y.o. female with hx of SLE on Benlysta and Plaquenil, GERD, FM, T2DM (A2c of 6.7) who presents to GI clinic for follow up of compensated cirrhosis as well as JESENIA.     Since our last visit she completed bidirectional endoscopy that noted portal colopathy, + grade 2 EV. She was started on coreg BID and tolerating well.     Today she reports some issues with constipation since her colonoscopy. She has not been taking any regular laxatives.        Patient completed 1 session of iron infusion with improvement in her ferritin now to 600. Repeat testing shows improvement in Hg level to 11.7 as well as ferritin of 77. She continues on iron supplementation.       She continues to have smoldering elevation in ALT/AST. She reports she drinks about 3-4 beers on social occasions, at least once a week. Review of prior records show she was seen by Hepatology in 2020 for elevated LFTs. At that time \"he consumes at least a 12 pack plus 4+ shots liquor typically every weekend.\"    She reports she did drink more during the pandemic.    She denies any family history of GI malignancy.    Past Medical History   has a past medical history of Encounter for examination of eyes and vision without abnormal findings, Hypertension, and Systemic lupus erythematosus, unspecified.    She has no past medical history of Personal history of irradiation.     Social History   reports that she has never smoked. She has never used smokeless tobacco. She reports current alcohol use. She reports that she does not use drugs.     Family History  family history includes Diabetes in her father; Glaucoma in her father and mother; Hypertension in her mother; sarcoma in her father.     Allergies  Allergies   Allergen Reactions    Metformin GI Upset     Side effects stopped when medication was discontinued    Sulfamethoxazole Itching       Medications  Current Outpatient Medications   Medication " Instructions    Benlysta 200 mg/mL injection 1 mL, Once Weekly    carvedilol (COREG) 6.25 mg, oral, Nightly    esomeprazole (NEXIUM) 40 mg, oral, Daily    ferrous sulfate 325 (65 Fe) mg EC tablet 1 tablet, oral, Daily with breakfast, Do not crush, chew, or split.    fluticasone (Flonase) 50 mcg/actuation nasal spray 1 spray, Each Nostril, Daily, Shake gently. Before first use, prime pump. After use, clean tip and replace cap.    folic acid (FOLVITE) 1 mg, oral, Daily    lisinopril 30 mg, oral, Daily    PlaqueniL 200 mg, 2 times daily    pregabalin (LYRICA) 150 mg, 3 times daily PRN    semaglutide (RYBELSUS) 7 mg, oral, Daily        Objective   Visit Vitals  /85   Pulse 69      Physical Exam  Constitutional:       General: She is not in acute distress.     Appearance: Normal appearance.   HENT:      Head: Normocephalic and atraumatic.      Mouth/Throat:      Mouth: Mucous membranes are moist.   Eyes:      Extraocular Movements: Extraocular movements intact.   Pulmonary:      Effort: Pulmonary effort is normal.      Breath sounds: No stridor. No wheezing.   Abdominal:      General: Abdomen is flat. There is no distension.   Musculoskeletal:         General: Normal range of motion.   Skin:     General: Skin is warm and dry.   Neurological:      General: No focal deficit present.      Mental Status: She is alert.   Psychiatric:         Mood and Affect: Mood normal.         Judgment: Judgment normal.         Assessment/Plan   Daylin Tyler is a 64 y.o. female with hx of SLE, GERD, FM, T2DM (A2c of 6.7) who presents to GI clinic for follow up of compensated cirrhosis as well as JESENIA.     Bidirectional endoscopy completed as above. She has since responded well to iron supplementation and anemia resolved. Will hold off on small bowel eval given this. She has new constipation likely related to iron supplement' will start tablespoon of benefiber and capful of miralax daily for this.    With regards to her  thrombocytopenia, US evaluation showing splenomegaly and cirrhosis.  . Prior US in 2023 with diffuse fatty infiltration.  Prior testing for viral hepatitis was negative in 2024. Suspect combination of EtOH given prior hx as well as MASLD. She appears compensated at this time. She was counseled on EtOH and recommend complete abstinence. Will complete CLD workup with autoimmune serologies as she continues to have smoldering liver enzymes and her hx of SLE.     Unable to calculate MELD with no INR level. Will obtain today.    - Ascites: no appreciable ascites   - HE: no hx,  - EV screening: EGD every 1-2 year or in event of acute decompensation. Continue coreg nightly. If HR remains >60 and BP allows will plan to increase to BID dosing at next visit. Her BP at 160 SBP today should allow uptitration.   - HCC: Abd imaging +/- AFP every 6 months       RTC in 3 months     In addition, I would recommend vaccination against hepatitis A and B. I recommend lifelong abstinence from alcohol and tobacco. I would recommend adherence to a strict 2 gram, low sodium diet. I would strictly avoid using NSAIDs for pain control given their risk for mucosal ulceration, bleeding and nephrotoxicity. For pain, I recommend acetaminophen, up to but not exceeding 2,000 mg daily. I would recommend against using narcotics or benzodiazepines given their risk for precipitating or exacerbating hepatic encephalopathy. I would recommend ongoing surveillance for HCC with abdominal imaging in conjunction with serum AFP. I would strictly avoid raw shellfish given the risk of Vibrio Vulnificus in cirrhotics.        Problem List Items Addressed This Visit       Alkaline phosphatase elevation    Relevant Orders    US abdomen complete    Anti-Smooth Muscle Antibody    Anti-Mitochondrial Antibody    Immunoglobulins (IgG, IgA, IgM)    Hepatic function panel    Protime-INR    Basic metabolic panel    Alpha-Fetoprotein     Other Visit Diagnoses         Cirrhosis  of liver without ascites, unspecified hepatic cirrhosis type (Multi)    -  Primary    Relevant Orders    Protime-INR                   Isela Man MD         My final recommendations will be communicated back to the requesting physician by way of shared Medical record or letter to requesting physician via fax.

## 2025-07-07 ENCOUNTER — APPOINTMENT (OUTPATIENT)
Dept: PRIMARY CARE | Facility: CLINIC | Age: 65
End: 2025-07-07
Payer: COMMERCIAL

## 2025-07-07 ENCOUNTER — TELEPHONE (OUTPATIENT)
Dept: GASTROENTEROLOGY | Facility: CLINIC | Age: 65
End: 2025-07-07

## 2025-07-07 ENCOUNTER — HOSPITAL ENCOUNTER (INPATIENT)
Facility: HOSPITAL | Age: 65
LOS: 2 days | Discharge: HOME | DRG: 432 | End: 2025-07-09
Attending: EMERGENCY MEDICINE | Admitting: STUDENT IN AN ORGANIZED HEALTH CARE EDUCATION/TRAINING PROGRAM
Payer: COMMERCIAL

## 2025-07-07 ENCOUNTER — APPOINTMENT (OUTPATIENT)
Dept: RADIOLOGY | Facility: HOSPITAL | Age: 65
DRG: 432 | End: 2025-07-07
Payer: COMMERCIAL

## 2025-07-07 DIAGNOSIS — R10.13 EPIGASTRIC PAIN: ICD-10-CM

## 2025-07-07 DIAGNOSIS — I85.00 ESOPHAGEAL VARICES WITHOUT BLEEDING, UNSPECIFIED ESOPHAGEAL VARICES TYPE (MULTI): ICD-10-CM

## 2025-07-07 DIAGNOSIS — K92.2 ACUTE UPPER GI BLEED: Primary | ICD-10-CM

## 2025-07-07 DIAGNOSIS — R11.2 NAUSEA AND VOMITING, UNSPECIFIED VOMITING TYPE: ICD-10-CM

## 2025-07-07 DIAGNOSIS — K74.60 CIRRHOSIS OF LIVER WITHOUT ASCITES, UNSPECIFIED HEPATIC CIRRHOSIS TYPE (MULTI): ICD-10-CM

## 2025-07-07 LAB
ALBUMIN SERPL BCP-MCNC: 3.7 G/DL (ref 3.4–5)
ALP SERPL-CCNC: 221 U/L (ref 33–136)
ALT SERPL W P-5'-P-CCNC: 46 U/L (ref 7–45)
ANION GAP SERPL CALC-SCNC: 13 MMOL/L (ref 10–20)
AST SERPL W P-5'-P-CCNC: 69 U/L (ref 9–39)
BASOPHILS # BLD AUTO: 0.04 X10*3/UL (ref 0–0.1)
BASOPHILS NFR BLD AUTO: 0.7 %
BILIRUB DIRECT SERPL-MCNC: 0.4 MG/DL (ref 0–0.3)
BILIRUB SERPL-MCNC: 1.4 MG/DL (ref 0–1.2)
BUN SERPL-MCNC: 11 MG/DL (ref 6–23)
CALCIUM SERPL-MCNC: 9.1 MG/DL (ref 8.6–10.3)
CHLORIDE SERPL-SCNC: 105 MMOL/L (ref 98–107)
CO2 SERPL-SCNC: 27 MMOL/L (ref 21–32)
CREAT SERPL-MCNC: 0.54 MG/DL (ref 0.5–1.05)
EGFRCR SERPLBLD CKD-EPI 2021: >90 ML/MIN/1.73M*2
EOSINOPHIL # BLD AUTO: 0.16 X10*3/UL (ref 0–0.7)
EOSINOPHIL NFR BLD AUTO: 3 %
ERYTHROCYTE [DISTWIDTH] IN BLOOD BY AUTOMATED COUNT: 14.7 % (ref 11.5–14.5)
GLUCOSE SERPL-MCNC: 129 MG/DL (ref 74–99)
HCT VFR BLD AUTO: 34.3 % (ref 36–46)
HGB BLD-MCNC: 11.1 G/DL (ref 12–16)
IMM GRANULOCYTES # BLD AUTO: 0.02 X10*3/UL (ref 0–0.7)
IMM GRANULOCYTES NFR BLD AUTO: 0.4 % (ref 0–0.9)
INR PPP: 1.1 (ref 0.9–1.1)
LACTATE SERPL-SCNC: 1.6 MMOL/L (ref 0.4–2)
LIPASE SERPL-CCNC: 16 U/L (ref 9–82)
LYMPHOCYTES # BLD AUTO: 0.92 X10*3/UL (ref 1.2–4.8)
LYMPHOCYTES NFR BLD AUTO: 17.1 %
MCH RBC QN AUTO: 29.4 PG (ref 26–34)
MCHC RBC AUTO-ENTMCNC: 32.4 G/DL (ref 32–36)
MCV RBC AUTO: 91 FL (ref 80–100)
MONOCYTES # BLD AUTO: 0.3 X10*3/UL (ref 0.1–1)
MONOCYTES NFR BLD AUTO: 5.6 %
NEUTROPHILS # BLD AUTO: 3.94 X10*3/UL (ref 1.2–7.7)
NEUTROPHILS NFR BLD AUTO: 73.2 %
NRBC BLD-RTO: 0 /100 WBCS (ref 0–0)
PLATELET # BLD AUTO: 69 X10*3/UL (ref 150–450)
POTASSIUM SERPL-SCNC: 3.8 MMOL/L (ref 3.5–5.3)
PROT SERPL-MCNC: 6.6 G/DL (ref 6.4–8.2)
PROTHROMBIN TIME: 12.5 SECONDS (ref 9.8–12.4)
RBC # BLD AUTO: 3.78 X10*6/UL (ref 4–5.2)
SODIUM SERPL-SCNC: 141 MMOL/L (ref 136–145)
WBC # BLD AUTO: 5.4 X10*3/UL (ref 4.4–11.3)

## 2025-07-07 PROCEDURE — 99222 1ST HOSP IP/OBS MODERATE 55: CPT | Performed by: STUDENT IN AN ORGANIZED HEALTH CARE EDUCATION/TRAINING PROGRAM

## 2025-07-07 PROCEDURE — 99285 EMERGENCY DEPT VISIT HI MDM: CPT | Mod: 25 | Performed by: EMERGENCY MEDICINE

## 2025-07-07 PROCEDURE — 85025 COMPLETE CBC W/AUTO DIFF WBC: CPT | Performed by: EMERGENCY MEDICINE

## 2025-07-07 PROCEDURE — 96374 THER/PROPH/DIAG INJ IV PUSH: CPT

## 2025-07-07 PROCEDURE — 74174 CTA ABD&PLVS W/CONTRAST: CPT

## 2025-07-07 PROCEDURE — 1200000002 HC GENERAL ROOM WITH TELEMETRY DAILY

## 2025-07-07 PROCEDURE — 80053 COMPREHEN METABOLIC PANEL: CPT | Performed by: EMERGENCY MEDICINE

## 2025-07-07 PROCEDURE — 96361 HYDRATE IV INFUSION ADD-ON: CPT

## 2025-07-07 PROCEDURE — 85610 PROTHROMBIN TIME: CPT | Performed by: EMERGENCY MEDICINE

## 2025-07-07 PROCEDURE — 96375 TX/PRO/DX INJ NEW DRUG ADDON: CPT

## 2025-07-07 PROCEDURE — 2500000004 HC RX 250 GENERAL PHARMACY W/ HCPCS (ALT 636 FOR OP/ED): Performed by: INTERNAL MEDICINE

## 2025-07-07 PROCEDURE — 2500000004 HC RX 250 GENERAL PHARMACY W/ HCPCS (ALT 636 FOR OP/ED)

## 2025-07-07 PROCEDURE — 83690 ASSAY OF LIPASE: CPT | Performed by: EMERGENCY MEDICINE

## 2025-07-07 PROCEDURE — 74174 CTA ABD&PLVS W/CONTRAST: CPT | Mod: FOREIGN READ | Performed by: RADIOLOGY

## 2025-07-07 PROCEDURE — 82248 BILIRUBIN DIRECT: CPT | Performed by: EMERGENCY MEDICINE

## 2025-07-07 PROCEDURE — 36415 COLL VENOUS BLD VENIPUNCTURE: CPT | Performed by: EMERGENCY MEDICINE

## 2025-07-07 PROCEDURE — 2500000004 HC RX 250 GENERAL PHARMACY W/ HCPCS (ALT 636 FOR OP/ED): Performed by: EMERGENCY MEDICINE

## 2025-07-07 PROCEDURE — 2550000001 HC RX 255 CONTRASTS: Performed by: EMERGENCY MEDICINE

## 2025-07-07 PROCEDURE — 2500000004 HC RX 250 GENERAL PHARMACY W/ HCPCS (ALT 636 FOR OP/ED): Performed by: STUDENT IN AN ORGANIZED HEALTH CARE EDUCATION/TRAINING PROGRAM

## 2025-07-07 PROCEDURE — 83605 ASSAY OF LACTIC ACID: CPT | Performed by: EMERGENCY MEDICINE

## 2025-07-07 PROCEDURE — 99222 1ST HOSP IP/OBS MODERATE 55: CPT | Performed by: INTERNAL MEDICINE

## 2025-07-07 RX ORDER — ONDANSETRON HYDROCHLORIDE 2 MG/ML
4 INJECTION, SOLUTION INTRAVENOUS EVERY 8 HOURS PRN
Status: DISCONTINUED | OUTPATIENT
Start: 2025-07-07 | End: 2025-07-09 | Stop reason: HOSPADM

## 2025-07-07 RX ORDER — IBUPROFEN 800 MG/1
1 TABLET, FILM COATED ORAL EVERY 8 HOURS PRN
Status: ON HOLD | COMMUNITY
Start: 2025-04-05 | End: 2025-07-08 | Stop reason: SINTOL

## 2025-07-07 RX ORDER — PANTOPRAZOLE SODIUM 40 MG/10ML
40 INJECTION, POWDER, LYOPHILIZED, FOR SOLUTION INTRAVENOUS 2 TIMES DAILY
Status: DISCONTINUED | OUTPATIENT
Start: 2025-07-07 | End: 2025-07-07 | Stop reason: SDUPTHER

## 2025-07-07 RX ORDER — POLYETHYLENE GLYCOL 3350 17 G/17G
17 POWDER, FOR SOLUTION ORAL DAILY PRN
Status: DISCONTINUED | OUTPATIENT
Start: 2025-07-07 | End: 2025-07-09 | Stop reason: HOSPADM

## 2025-07-07 RX ORDER — ONDANSETRON HYDROCHLORIDE 2 MG/ML
4 INJECTION, SOLUTION INTRAVENOUS ONCE
Status: COMPLETED | OUTPATIENT
Start: 2025-07-07 | End: 2025-07-07

## 2025-07-07 RX ORDER — PANTOPRAZOLE SODIUM 40 MG/10ML
INJECTION, POWDER, LYOPHILIZED, FOR SOLUTION INTRAVENOUS
Status: COMPLETED
Start: 2025-07-07 | End: 2025-07-07

## 2025-07-07 RX ORDER — PANTOPRAZOLE SODIUM 40 MG/10ML
40 INJECTION, POWDER, LYOPHILIZED, FOR SOLUTION INTRAVENOUS 2 TIMES DAILY
Status: DISCONTINUED | OUTPATIENT
Start: 2025-07-07 | End: 2025-07-09

## 2025-07-07 RX ORDER — PANTOPRAZOLE SODIUM 40 MG/10ML
80 INJECTION, POWDER, LYOPHILIZED, FOR SOLUTION INTRAVENOUS ONCE
Status: COMPLETED | OUTPATIENT
Start: 2025-07-07 | End: 2025-07-07

## 2025-07-07 RX ORDER — MORPHINE SULFATE 4 MG/ML
4 INJECTION, SOLUTION INTRAMUSCULAR; INTRAVENOUS
Status: DISCONTINUED | OUTPATIENT
Start: 2025-07-07 | End: 2025-07-09 | Stop reason: HOSPADM

## 2025-07-07 RX ORDER — CEFTRIAXONE 1 G/50ML
1 INJECTION, SOLUTION INTRAVENOUS EVERY 24 HOURS
Status: DISCONTINUED | OUTPATIENT
Start: 2025-07-07 | End: 2025-07-07

## 2025-07-07 RX ORDER — SODIUM CHLORIDE, SODIUM LACTATE, POTASSIUM CHLORIDE, CALCIUM CHLORIDE 600; 310; 30; 20 MG/100ML; MG/100ML; MG/100ML; MG/100ML
100 INJECTION, SOLUTION INTRAVENOUS CONTINUOUS
Status: DISCONTINUED | OUTPATIENT
Start: 2025-07-07 | End: 2025-07-08

## 2025-07-07 RX ORDER — ESOMEPRAZOLE MAGNESIUM 20 MG/1
40 GRANULE, DELAYED RELEASE ORAL 2 TIMES DAILY
Status: DISCONTINUED | OUTPATIENT
Start: 2025-07-07 | End: 2025-07-09

## 2025-07-07 RX ORDER — ONDANSETRON HYDROCHLORIDE 2 MG/ML
INJECTION, SOLUTION INTRAVENOUS
Status: COMPLETED
Start: 2025-07-07 | End: 2025-07-07

## 2025-07-07 RX ADMIN — SODIUM CHLORIDE 1000 ML: 0.9 INJECTION, SOLUTION INTRAVENOUS at 11:42

## 2025-07-07 RX ADMIN — MORPHINE SULFATE 4 MG: 4 INJECTION, SOLUTION INTRAMUSCULAR; INTRAVENOUS at 18:11

## 2025-07-07 RX ADMIN — PANTOPRAZOLE SODIUM 80 MG: 40 INJECTION, POWDER, LYOPHILIZED, FOR SOLUTION INTRAVENOUS at 11:41

## 2025-07-07 RX ADMIN — ONDANSETRON 4 MG: 2 INJECTION INTRAMUSCULAR; INTRAVENOUS at 11:41

## 2025-07-07 RX ADMIN — ONDANSETRON 4 MG: 2 INJECTION INTRAMUSCULAR; INTRAVENOUS at 11:40

## 2025-07-07 RX ADMIN — ONDANSETRON 4 MG: 2 INJECTION, SOLUTION INTRAMUSCULAR; INTRAVENOUS at 11:41

## 2025-07-07 RX ADMIN — SODIUM CHLORIDE, SODIUM LACTATE, POTASSIUM CHLORIDE, AND CALCIUM CHLORIDE 100 ML/HR: .6; .31; .03; .02 INJECTION, SOLUTION INTRAVENOUS at 18:11

## 2025-07-07 RX ADMIN — IOHEXOL 75 ML: 350 INJECTION, SOLUTION INTRAVENOUS at 12:23

## 2025-07-07 RX ADMIN — PANTOPRAZOLE SODIUM 80 MG: 40 INJECTION, POWDER, FOR SOLUTION INTRAVENOUS at 11:41

## 2025-07-07 RX ADMIN — OCTREOTIDE ACETATE 50 MCG/HR: 500 INJECTION, SOLUTION INTRAVENOUS; SUBCUTANEOUS at 15:55

## 2025-07-07 RX ADMIN — PANTOPRAZOLE SODIUM 40 MG: 40 INJECTION, POWDER, FOR SOLUTION INTRAVENOUS at 20:24

## 2025-07-07 SDOH — ECONOMIC STABILITY: INCOME INSECURITY: IN THE PAST 12 MONTHS HAS THE ELECTRIC, GAS, OIL, OR WATER COMPANY THREATENED TO SHUT OFF SERVICES IN YOUR HOME?: NO

## 2025-07-07 SDOH — SOCIAL STABILITY: SOCIAL INSECURITY: WITHIN THE LAST YEAR, HAVE YOU BEEN AFRAID OF YOUR PARTNER OR EX-PARTNER?: NO

## 2025-07-07 SDOH — SOCIAL STABILITY: SOCIAL INSECURITY: HAVE YOU HAD THOUGHTS OF HARMING ANYONE ELSE?: NO

## 2025-07-07 SDOH — SOCIAL STABILITY: SOCIAL INSECURITY: WITHIN THE LAST YEAR, HAVE YOU BEEN HUMILIATED OR EMOTIONALLY ABUSED IN OTHER WAYS BY YOUR PARTNER OR EX-PARTNER?: NO

## 2025-07-07 SDOH — ECONOMIC STABILITY: FOOD INSECURITY: WITHIN THE PAST 12 MONTHS, THE FOOD YOU BOUGHT JUST DIDN'T LAST AND YOU DIDN'T HAVE MONEY TO GET MORE.: NEVER TRUE

## 2025-07-07 SDOH — ECONOMIC STABILITY: HOUSING INSECURITY: AT ANY TIME IN THE PAST 12 MONTHS, WERE YOU HOMELESS OR LIVING IN A SHELTER (INCLUDING NOW)?: NO

## 2025-07-07 SDOH — SOCIAL STABILITY: SOCIAL INSECURITY
WITHIN THE LAST YEAR, HAVE YOU BEEN KICKED, HIT, SLAPPED, OR OTHERWISE PHYSICALLY HURT BY YOUR PARTNER OR EX-PARTNER?: NO

## 2025-07-07 SDOH — ECONOMIC STABILITY: HOUSING INSECURITY: IN THE LAST 12 MONTHS, WAS THERE A TIME WHEN YOU WERE NOT ABLE TO PAY THE MORTGAGE OR RENT ON TIME?: NO

## 2025-07-07 SDOH — SOCIAL STABILITY: SOCIAL INSECURITY
WITHIN THE LAST YEAR, HAVE YOU BEEN RAPED OR FORCED TO HAVE ANY KIND OF SEXUAL ACTIVITY BY YOUR PARTNER OR EX-PARTNER?: NO

## 2025-07-07 SDOH — SOCIAL STABILITY: SOCIAL INSECURITY: ARE YOU OR HAVE YOU BEEN THREATENED OR ABUSED PHYSICALLY, EMOTIONALLY, OR SEXUALLY BY ANYONE?: NO

## 2025-07-07 SDOH — ECONOMIC STABILITY: FOOD INSECURITY: WITHIN THE PAST 12 MONTHS, YOU WORRIED THAT YOUR FOOD WOULD RUN OUT BEFORE YOU GOT THE MONEY TO BUY MORE.: NEVER TRUE

## 2025-07-07 SDOH — SOCIAL STABILITY: SOCIAL INSECURITY: ARE THERE ANY APPARENT SIGNS OF INJURIES/BEHAVIORS THAT COULD BE RELATED TO ABUSE/NEGLECT?: NO

## 2025-07-07 SDOH — SOCIAL STABILITY: SOCIAL INSECURITY: DO YOU FEEL UNSAFE GOING BACK TO THE PLACE WHERE YOU ARE LIVING?: NO

## 2025-07-07 SDOH — ECONOMIC STABILITY: FOOD INSECURITY: HOW HARD IS IT FOR YOU TO PAY FOR THE VERY BASICS LIKE FOOD, HOUSING, MEDICAL CARE, AND HEATING?: NOT HARD AT ALL

## 2025-07-07 SDOH — SOCIAL STABILITY: SOCIAL INSECURITY: DOES ANYONE TRY TO KEEP YOU FROM HAVING/CONTACTING OTHER FRIENDS OR DOING THINGS OUTSIDE YOUR HOME?: NO

## 2025-07-07 SDOH — SOCIAL STABILITY: SOCIAL INSECURITY: WERE YOU ABLE TO COMPLETE ALL THE BEHAVIORAL HEALTH SCREENINGS?: YES

## 2025-07-07 SDOH — ECONOMIC STABILITY: HOUSING INSECURITY: IN THE PAST 12 MONTHS, HOW MANY TIMES HAVE YOU MOVED WHERE YOU WERE LIVING?: 0

## 2025-07-07 SDOH — SOCIAL STABILITY: SOCIAL INSECURITY: HAS ANYONE EVER THREATENED TO HURT YOUR FAMILY OR YOUR PETS?: NO

## 2025-07-07 SDOH — ECONOMIC STABILITY: TRANSPORTATION INSECURITY: IN THE PAST 12 MONTHS, HAS LACK OF TRANSPORTATION KEPT YOU FROM MEDICAL APPOINTMENTS OR FROM GETTING MEDICATIONS?: NO

## 2025-07-07 SDOH — SOCIAL STABILITY: SOCIAL INSECURITY: ABUSE: ADULT

## 2025-07-07 SDOH — SOCIAL STABILITY: SOCIAL INSECURITY: DO YOU FEEL ANYONE HAS EXPLOITED OR TAKEN ADVANTAGE OF YOU FINANCIALLY OR OF YOUR PERSONAL PROPERTY?: NO

## 2025-07-07 ASSESSMENT — LIFESTYLE VARIABLES
TOTAL SCORE: 0
HOW OFTEN DO YOU HAVE A DRINK CONTAINING ALCOHOL: MONTHLY OR LESS
AUDIT-C TOTAL SCORE: 3
HAVE YOU EVER FELT YOU SHOULD CUT DOWN ON YOUR DRINKING: NO
EVER HAD A DRINK FIRST THING IN THE MORNING TO STEADY YOUR NERVES TO GET RID OF A HANGOVER: NO
SKIP TO QUESTIONS 9-10: 0
EVER FELT BAD OR GUILTY ABOUT YOUR DRINKING: NO
HAVE PEOPLE ANNOYED YOU BY CRITICIZING YOUR DRINKING: NO
HOW MANY STANDARD DRINKS CONTAINING ALCOHOL DO YOU HAVE ON A TYPICAL DAY: 3 OR 4
HOW OFTEN DO YOU HAVE 6 OR MORE DRINKS ON ONE OCCASION: LESS THAN MONTHLY
AUDIT-C TOTAL SCORE: 3

## 2025-07-07 ASSESSMENT — PAIN SCALES - GENERAL
PAINLEVEL_OUTOF10: 0 - NO PAIN
PAINLEVEL_OUTOF10: 3
PAINLEVEL_OUTOF10: 3
PAINLEVEL_OUTOF10: 0 - NO PAIN
PAINLEVEL_OUTOF10: 5 - MODERATE PAIN
PAINLEVEL_OUTOF10: 0 - NO PAIN

## 2025-07-07 ASSESSMENT — ACTIVITIES OF DAILY LIVING (ADL)
LACK_OF_TRANSPORTATION: NO
BATHING: INDEPENDENT
FEEDING YOURSELF: INDEPENDENT
DRESSING YOURSELF: INDEPENDENT
WALKS IN HOME: INDEPENDENT
ADEQUATE_TO_COMPLETE_ADL: YES
ASSISTIVE_DEVICE: EYEGLASSES
LACK_OF_TRANSPORTATION: NO
GROOMING: INDEPENDENT
TOILETING: INDEPENDENT
HEARING - LEFT EAR: FUNCTIONAL
PATIENT'S MEMORY ADEQUATE TO SAFELY COMPLETE DAILY ACTIVITIES?: YES
HEARING - RIGHT EAR: FUNCTIONAL
JUDGMENT_ADEQUATE_SAFELY_COMPLETE_DAILY_ACTIVITIES: YES

## 2025-07-07 ASSESSMENT — COGNITIVE AND FUNCTIONAL STATUS - GENERAL
MOBILITY SCORE: 24
MOBILITY SCORE: 24
PATIENT BASELINE BEDBOUND: NO
DAILY ACTIVITIY SCORE: 24
DAILY ACTIVITIY SCORE: 24

## 2025-07-07 ASSESSMENT — PATIENT HEALTH QUESTIONNAIRE - PHQ9
2. FEELING DOWN, DEPRESSED OR HOPELESS: NOT AT ALL
1. LITTLE INTEREST OR PLEASURE IN DOING THINGS: NOT AT ALL
SUM OF ALL RESPONSES TO PHQ9 QUESTIONS 1 & 2: 0

## 2025-07-07 ASSESSMENT — PAIN - FUNCTIONAL ASSESSMENT
PAIN_FUNCTIONAL_ASSESSMENT: 0-10

## 2025-07-07 ASSESSMENT — PAIN DESCRIPTION - LOCATION
LOCATION: ABDOMEN
LOCATION: HEAD

## 2025-07-07 ASSESSMENT — PAIN DESCRIPTION - PAIN TYPE: TYPE: ACUTE PAIN

## 2025-07-07 NOTE — TELEPHONE ENCOUNTER
I called patient, advised her to go to the ER per Dr. Man.  She states that she will go to [facility].

## 2025-07-07 NOTE — CARE PLAN
The patient's goals for the shift include rest and comfort    The clinical goals for the shift include Patient will show no s/s of bleed throughout shift    Over the shift, the patient did not make progress toward the following goals. Barriers to progression include; none. Recommendations to address these barriers include; continue current plan of care.      Problem: Pain - Adult  Goal: Verbalizes/displays adequate comfort level or baseline comfort level  Outcome: Progressing  Flowsheets (Taken 7/7/2025 1755)  Verbalizes/displays adequate comfort level or baseline comfort level:   Encourage patient to monitor pain and request assistance   Administer analgesics based on type and severity of pain and evaluate response   Consider cultural and social influences on pain and pain management   Implement non-pharmacological measures as appropriate and evaluate response   Assess pain using appropriate pain scale     Problem: Safety - Adult  Goal: Free from fall injury  Outcome: Progressing  Flowsheets (Taken 7/7/2025 1755)  Free from fall injury: Instruct family/caregiver on patient safety

## 2025-07-07 NOTE — Clinical Note
Huddle and Timeout completed together with team. Patient wristband and JESSIE information verified.  Anesthesia safety check completed. Patient was awake and oriented x4

## 2025-07-07 NOTE — ED PROVIDER NOTES
HPI   Chief Complaint   Patient presents with    Abdominal Pain     Saturday, has multiple medical complaints states she threw up blood Saturday x 1,  sent her here to get checked       Patient is a 65-year-old female with history of gastritis fibromyalgia who comes in complaining of upper abdominal pain burning in the stomach and on Saturday vomited large amounts of blood with blood clots and the next day she noticed blood in her stools, dark stools, not feeling well.  No fever no chills              Patient History   Medical History[1]  Surgical History[2]  Family History[3]  Social History[4]    Physical Exam   ED Triage Vitals [07/07/25 1101]   Temperature Heart Rate Respirations BP   36.5 °C (97.7 °F) 81 18 145/68      Pulse Ox Temp Source Heart Rate Source Patient Position   96 % Temporal -- --      BP Location FiO2 (%)     -- --       Physical Exam  Vitals and nursing note reviewed. Exam conducted with a chaperone present.   Constitutional:       Appearance: Normal appearance.   HENT:      Head: Normocephalic and atraumatic.      Nose: Nose normal.      Mouth/Throat:      Mouth: Mucous membranes are moist.   Eyes:      Pupils: Pupils are equal, round, and reactive to light.   Cardiovascular:      Rate and Rhythm: Normal rate and regular rhythm.   Pulmonary:      Effort: Pulmonary effort is normal.   Abdominal:      Palpations: Abdomen is soft.      Tenderness: There is abdominal tenderness in the epigastric area.   Musculoskeletal:         General: Normal range of motion.      Cervical back: Normal range of motion.   Skin:     General: Skin is warm and dry.      Capillary Refill: Capillary refill takes less than 2 seconds.   Neurological:      General: No focal deficit present.      Mental Status: She is alert.   Psychiatric:         Mood and Affect: Mood normal.           ED Course & MDM   Diagnoses as of 07/07/25 1512   Acute upper GI bleed   Epigastric pain   Nausea and vomiting, unspecified vomiting  type                 No data recorded                                 Medical Decision Making  EKG interpreted by me shows normal sinus rhythm rate of 68 normal QRS nonspecific ST changes  I reviewed the patient's old records and her recent endoscopy on April of this year  My differential diagnosis  Acute gastritis acute upper GI bleed acute variceal bleed  I will do CBC chemistries liver function test CT scan of the abdomen pelvis along with IV Protonix and IV fluids will reevaluate once results are obtained  CBC chemistries were unremarkable at baseline CT scan was negative for any acute bleed at this time discussed with medicine on-call and GI on-call patient was admitted to the hospitalist team.  Labs Reviewed  CBC WITH AUTO DIFFERENTIAL - Abnormal     WBC                           5.4                    nRBC                          0.0                    RBC                           3.78 (*)               Hemoglobin                    11.1 (*)               Hematocrit                    34.3 (*)               MCV                           91                     MCH                           29.4                   MCHC                          32.4                   RDW                           14.7 (*)               Platelets                     69 (*)                 Neutrophils %                 73.2                   Immature Granulocytes %, Automated   0.4                    Lymphocytes %                 17.1                   Monocytes %                   5.6                    Eosinophils %                 3.0                    Basophils %                   0.7                    Neutrophils Absolute          3.94                   Immature Granulocytes Absolute, Au*   0.02                   Lymphocytes Absolute          0.92 (*)               Monocytes Absolute            0.30                   Eosinophils Absolute          0.16                   Basophils Absolute            0.04                 BASIC METABOLIC PANEL - Abnormal     Glucose                       129 (*)                Sodium                        141                    Potassium                     3.8                    Chloride                      105                    Bicarbonate                   27                     Anion Gap                     13                     Urea Nitrogen                 11                     Creatinine                    0.54                   eGFR                          >90                    Calcium                       9.1                 HEPATIC FUNCTION PANEL - Abnormal     Albumin                       3.7                    Bilirubin, Total              1.4 (*)                Bilirubin, Direct             0.4 (*)                Alkaline Phosphatase          221 (*)                ALT                           46 (*)                 AST                           69 (*)                 Total Protein                 6.6                 PROTIME-INR - Abnormal     Protime                       12.5 (*)               INR                           1.1                 LIPASE - Normal     Lipase                        16                       Narrative: Venipuncture immediately after or during the administration of Metamizole may lead to falsely low results. Testing should be performed immediately prior to Metamizole dosing.  LACTATE - Normal     CT angio abdomen pelvis w and or wo IV IV contrast   Final Result    1.  No areas of contrast extravasation within the small bowel or    colonic loops to indicate an active arterial bleed.    2.  Sigmoid diverticulosis, with no evidence of diverticulitis.    3.  Cirrhotic morphology of the liver.    4.  Splenomegaly.    5.  Hiatal hernia.    6.  Cholelithiasis.  Mild gallbladder wall thickening.  This is most    likely related to underlying cirrhosis.  If acute cholecystitis is    suspected, follow-up with a HIDA scan is recommended.    7.  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    Signed by Abraham Gamboa MD                Procedure  Procedures       [1]   Past Medical History:  Diagnosis Date    Encounter for examination of eyes and vision without abnormal findings     Encounter for eye exam    Hypertension     Systemic lupus erythematosus, unspecified     History of systemic lupus erythematosus (SLE)   [2]   Past Surgical History:  Procedure Laterality Date     SECTION, CLASSIC       section    ENDOMETRIAL ABLATION      Ablation    EYE SURGERY      Eye surgery   [3]   Family History  Problem Relation Name Age of Onset    Glaucoma Mother      Hypertension Mother      Diabetes Father      Glaucoma Father      Other (sarcoma) Father     [4]   Social History  Tobacco Use    Smoking status: Never    Smokeless tobacco: Never   Vaping Use    Vaping status: Never Used   Substance Use Topics    Alcohol use: Yes    Drug use: Never        Raya Villa MD  [phone]

## 2025-07-07 NOTE — Clinical Note
Patient tolerated procedure well. Appears comfortable with no complaints of pain. VS stable. Arousable prior to transport. Patient transported to nursing floorvia bed.  Report called              . Handoff completed

## 2025-07-07 NOTE — H&P
Medical Group History and Physical  ASSESSMENT & PLAN:   Daylin Tyler is a 65 y.o. female admitted to [facility] for management of:    Hematemesis  Hx of esophageal varices  Cirrhosis  - CTA A/P did not have findings suggestive of active bleed. It did reveal cirrhosis, hepatosplenomegaly  - s/p IV Protonix 80 mg -> continue BID   - Start octreotide infusion and maintenance fluids  - Type and crossmatch, hold anticoagulation  - GI consulted for endoscopy: NPO at midnight, EGD tomorrow morning  - Transfuse for Hb <7 g/dL     VTE Prophylaxis: None 2/2 suspected GI bleed    Disposition: Anticipate LOS > 2 midnights.    Level of MDM:  High   Risk: High   Data Reviewed and/or Analyzed:   Included: Prior external notes from at least 1 unique source, Results, including laboratory findings and imaging reports, listed above, Orders and notes from all consultants involved, and Notes for this encounter.  I personally reviewed the tests referenced above.    The patient/family had opportunity to ask questions. All questions were answered to the best of my ability.    Elias Mccullough, Merged with [facility]    HISTORY OF PRESENT ILLNESS:   History Of Present Illness:    Daylin Tyler is a 65 y.o. female with a significant past medical history of Obesity, Cirrhosis, SLE on Benlysta and Plaquenil, GERD, JESENIA, T2DM (A2c of 6.7) who presented to [facility] due to vomiting blood and clots Saturday morning.      Review of systems: 10 point review of systems is otherwise negative except as mentioned above.    PAST HISTORIES:     Past Medical History:  She has a past medical history of Encounter for examination of eyes and vision without abnormal findings, Hypertension, and Systemic lupus erythematosus, unspecified.    She has no past medical history of Personal history of irradiation.    Past Surgical History:  She has a past surgical history that includes Endometrial ablation;  section, classic; and Eye surgery.      Social  "History:  She reports that she has never smoked. She has never used smokeless tobacco. She reports current alcohol use. She reports that she does not use drugs.    Family History:  Family History[1]     Allergies:  Metformin and Sulfamethoxazole    OBJECTIVE:     Last Recorded Vitals:  Vitals:    07/07/25 1101   BP: 145/68   Pulse: 81   Resp: 18   Temp: 36.5 °C (97.7 °F)   TempSrc: Temporal   SpO2: 96%   Weight: 78.5 kg (173 lb)   Height: 1.575 m (5' 2\")     Last I/O:  No intake/output data recorded.    Physical Exam  Vitals and nursing note reviewed.   Constitutional:       General: She is not in acute distress.     Appearance: Normal appearance. She is obese.   HENT:      Head: Normocephalic and atraumatic.      Mouth/Throat:      Mouth: Mucous membranes are moist.   Eyes:      Extraocular Movements: Extraocular movements intact.      Conjunctiva/sclera: Conjunctivae normal.   Cardiovascular:      Rate and Rhythm: Normal rate and regular rhythm.      Pulses: Normal pulses.      Heart sounds: Normal heart sounds.   Pulmonary:      Effort: Pulmonary effort is normal.      Breath sounds: Normal breath sounds.   Abdominal:      General: Abdomen is flat. Bowel sounds are normal.      Palpations: Abdomen is soft.   Skin:     General: Skin is warm.      Capillary Refill: Capillary refill takes less than 2 seconds.   Neurological:      General: No focal deficit present.      Mental Status: She is alert and oriented to person, place, and time. Mental status is at baseline.   Psychiatric:         Mood and Affect: Mood normal.         Behavior: Behavior normal.         Thought Content: Thought content normal.         Scheduled Medications  Scheduled Medications[2]  PRN Medications  PRN Medications[3]  Continuous Medications  Continuous Medications[4]    Outpatient Medications:  Prior to Admission medications    Medication Sig Start Date End Date Taking? Authorizing Provider   Benlysta 200 mg/mL injection Inject 1 mL (200 mg) " under the skin 1 (one) time per week.    Historical Provider, MD   carvedilol (Coreg) 6.25 mg tablet Take 1 tablet (6.25 mg) by mouth once daily at bedtime. 4/29/25   Isela Man MD   esomeprazole (NexIUM) 40 mg DR capsule Take 1 capsule (40 mg) by mouth once daily. 10/9/23   Regina Romo PA-C   ferrous sulfate 325 (65 Fe) mg EC tablet Take 1 tablet by mouth once daily with breakfast. Do not crush, chew, or split. 4/29/25   Isela Man MD   fluticasone (Flonase) 50 mcg/actuation nasal spray Administer 1 spray into each nostril once daily. Shake gently. Before first use, prime pump. After use, clean tip and replace cap. 1/7/25 1/7/26  Susana Grimm PA-C   folic acid (Folvite) 1 mg tablet Take 1 tablet (1 mg) by mouth once daily. 3/10/25 3/10/26  Regina Romo PA-C   lisinopril 30 mg tablet Take 1 tablet (30 mg) by mouth once daily. 12/2/24   Durga Villareal DO   PlaqueniL 200 mg tablet Take 1 tablet (200 mg) by mouth twice a day. With food    Historical Provider, MD   pregabalin (Lyrica) 150 mg capsule Take 1 capsule (150 mg) by mouth 3 times a day as needed.    Historical Provider, MD   semaglutide (Rybelsus) 7 mg tablet Take 1 tablet (7 mg) by mouth once daily. 4/15/25   Regina Romo PA-C       LABS AND IMAGING:     Labs:  Results from last 7 days   Lab Units 07/07/25  1136   WBC AUTO x10*3/uL 5.4   RBC AUTO x10*6/uL 3.78*   HEMOGLOBIN g/dL 11.1*   HEMATOCRIT % 34.3*   MCV fL 91   MCH pg 29.4   MCHC g/dL 32.4   RDW % 14.7*   PLATELETS AUTO x10*3/uL 69*     Results from last 7 days   Lab Units 07/07/25  1136   SODIUM mmol/L 141   POTASSIUM mmol/L 3.8   CHLORIDE mmol/L 105   CO2 mmol/L 27   BUN mg/dL 11   CREATININE mg/dL 0.54   GLUCOSE mg/dL 129*   PROTEIN TOTAL g/dL 6.6   CALCIUM mg/dL 9.1   BILIRUBIN TOTAL mg/dL 1.4*   ALK PHOS U/L 221*   AST U/L 69*   ALT U/L 46*               Imaging:  CT angio abdomen pelvis w and or wo IV IV contrast  Narrative: STUDY:  CT CTA ABDOMEN and PELVIS w + wo  CONTRAST; 7/7/2025 12:34 PM  INDICATION:  Evaluate for a GI bleed.  TECHNIQUE:  Helical CT is performed from the aortic diaphragmatic  hiatus through the symphysis pubis before and after bolus  administration of 100 mL of Omnipaque 350.  Images were reviewed and  processed at a workstation according to the CT angiogram protocol with  3-D and/or MIP post processing imaging generated.    Automated mA/kV exposure control was utilized and patient examination  was performed in strict accordance with principles of ALARA.  RADIATION AMOUNT:  2559.5 mGy-cm.  COMPARISON:  US abdomen 04/10/2025.  FINDINGS:   There is a nodular contour of the liver, indicative of cirrhosis.  The  portal and hepatic veins are patent.  There is no intrahepatic ductal  dilatation.  Gallstones are present.  There is mild gallbladder wall  thickening.  This may be secondary underlying cirrhosis.  The spleen  is enlarged at 14.6 cm.  The splenic vein is patent.  No inflammatory  changes are seen surrounding the pancreas.  No adrenal nodule is  noted.  No acute findings are seen within either kidney.  There is no  evidence of hydronephrosis.  No dilated loops of small bowel or colon  are visualized.  There are diverticula seen in the sigmoid colon.   There is no evidence of diverticulitis.  There are no areas of  contrast extravasation within the small bowel or colonic loops to  indicate an active arterial bleed.  No pooling contrast is seen on the  delayed images.  The appendix is normal in appearance.  A hiatal  hernia is present.  No enlarged lymph nodes are seen within the pelvic  sidewalls, iliac chains, or retroperitoneum.  No prominent edema is  seen within the psoas musculature.  Degenerative changes are seen  throughout the lumbar spine.  There is no evidence of aneurysmal dilatation of the abdominal aorta.   There is a mild stenosis at the origin of the celiac artery.  The  superior mesenteric and inferior mesenteric arteries are patent.    There is a single right renal artery which is patent.  There are 2  left renal arteries which are patent.  Impression: 1.  No areas of contrast extravasation within the small bowel or  colonic loops to indicate an active arterial bleed.  2.  Sigmoid diverticulosis, with no evidence of diverticulitis.  3.  Cirrhotic morphology of the liver.  4.  Splenomegaly.  5.  Hiatal hernia.  6.  Cholelithiasis.  Mild gallbladder wall thickening.  This is most  likely related to underlying cirrhosis.  If acute cholecystitis is  suspected, follow-up with a HIDA scan is recommended.  7.  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  Signed by Abraham Gamboa MD             [1]   Family History  Problem Relation Name Age of Onset    Glaucoma Mother      Hypertension Mother      Diabetes Father      Glaucoma Father      Other (sarcoma) Father     [2]    [3] [4] octreotide, 50 mcg/hr

## 2025-07-07 NOTE — CONSULTS
Reason For Consult  GI bleed    History Of Present Illness  Daylin Tyler is a 65 y.o. female presenting with few episode of large hematemesis.  Last 1 was Saturday morning.  She was called primary GI who told her to come to the emergency department.  She is vitally stable.  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  Her labs reviewed:    Hemoglobin 11.1 with hematocrit 34.3.  Platelet count 69, she has normal BUN and creatinine.  CT angio abdomen pelvis today showed morphology of the liver cirrhosis, no ascites.  No extravasation was noted.  She has hepatosplenomegaly.  Hiatal hernia.  Cholelithiasis, mild gallbladder wall thickening correlate to liver cirrhosis.  Mild stenosis at the origin of celiac artery.       Past Medical History  She has a past medical history of Encounter for examination of eyes and vision without abnormal findings, Hypertension, and Systemic lupus erythematosus, unspecified.    She has no past medical history of Personal history of irradiation.    Surgical History  She has a past surgical history that includes Endometrial ablation;  section, classic; and Eye surgery.     Social History  She reports that she has never smoked. She has never used smokeless tobacco. She reports current alcohol use. She reports that she does not use drugs.    Family History  Family History[1]     Allergies  Metformin and Sulfamethoxazole    Review of Systems  Review of Systems   Constitutional: Negative.  Negative for activity change, appetite change, chills, fatigue, fever and unexpected weight change.   HENT: Negative.     Respiratory: Negative.     Cardiovascular: Negative.  Negative for chest pain and palpitations.   Gastrointestinal: Negative.  Negative for abdominal distention,  abdominal pain, anal bleeding, blood in stool, constipation, diarrhea, nausea, rectal pain and vomiting.   Skin: Negative.  Negative for color change and rash.   Neurological: Negative.  Negative for dizziness, tremors, seizures, weakness and headaches.   Psychiatric/Behavioral: Negative.  Negative for confusion.     Physical Exam  General appearance: No acute distress, cooperative.  Eyes: Nonicteric, no redness or proptosis  Ears, nose, mouth, and throat: Moist mucous membranes, tongue normal  Neck: Supple, no lymphadenopathy or thyromegaly  Lungs: Clear to auscultation bilaterally  CV: Regular rate and rhythm, no murmur; no pitting edema in the lower extremities  Abd: soft, non-tender; non-distended; normal active bowel sounds; no  scars  Skin: No rashes or lesions; no liver stigmata  MSK: No deformities or joint edema/redness/tenderness  Neuro: Alert and oriented ×3, normal gait, non-focal no asterixis    Last Recorded Vitals  Blood pressure 151/68, pulse 71, temperature 36.9 °C (98.4 °F), temperature source Temporal, resp. rate 16, height (!) 1.524 m (5'), weight 80.7 kg (178 lb), SpO2 95%.    Relevant Results      Lab Results   Component Value Date    WBC 5.4 07/07/2025    HGB 11.1 (L) 07/07/2025    HCT 34.3 (L) 07/07/2025    MCV 91 07/07/2025    PLT 69 (L) 07/07/2025       Lab Results   Component Value Date    ALT 46 (H) 07/07/2025    AST 69 (H) 07/07/2025    ALKPHOS 221 (H) 07/07/2025    BILITOT 1.4 (H) 07/07/2025     Lab Results   Component Value Date    INR 1.1 07/07/2025    PROTIME 12.5 (H) 07/07/2025       Lab Results   Component Value Date    IRON 113 04/09/2025    TIBC 336 04/09/2025    FERRITIN 77 04/09/2025        Assessment/Plan     Hematemesis, could be due to esophageal varices, patient has history of liver cirrhosis, without encephalopathy or ascites.  She does not have any sign of active GI bleed  Continue monitoring hemoglobin, transfuse if hemoglobin less than 7  Start the patient on  pantoprazole 40 mg IV twice daily  Octreotide infusion  Okay to start on clear liquid diet  Will schedule preliminary for EGD possible banding tomorrow    Other stable medical condition  Continue home medication    I spent 45 minutes in the professional and overall care of this patient.      Ruthy Victoria MD         [1]   Family History  Problem Relation Name Age of Onset    Glaucoma Mother      Hypertension Mother      Diabetes Father      Glaucoma Father      Other (sarcoma) Father

## 2025-07-08 ENCOUNTER — ANESTHESIA EVENT (OUTPATIENT)
Dept: GASTROENTEROLOGY | Facility: HOSPITAL | Age: 65
End: 2025-07-08
Payer: COMMERCIAL

## 2025-07-08 ENCOUNTER — ANESTHESIA (OUTPATIENT)
Dept: GASTROENTEROLOGY | Facility: HOSPITAL | Age: 65
End: 2025-07-08
Payer: COMMERCIAL

## 2025-07-08 ENCOUNTER — APPOINTMENT (OUTPATIENT)
Dept: GASTROENTEROLOGY | Facility: HOSPITAL | Age: 65
DRG: 432 | End: 2025-07-08
Payer: COMMERCIAL

## 2025-07-08 LAB
ALBUMIN SERPL BCP-MCNC: 3 G/DL (ref 3.4–5)
ALP SERPL-CCNC: 167 U/L (ref 33–136)
ALT SERPL W P-5'-P-CCNC: 31 U/L (ref 7–45)
ANION GAP SERPL CALC-SCNC: 8 MMOL/L (ref 10–20)
AST SERPL W P-5'-P-CCNC: 42 U/L (ref 9–39)
BILIRUB DIRECT SERPL-MCNC: 0.4 MG/DL (ref 0–0.3)
BILIRUB SERPL-MCNC: 1.2 MG/DL (ref 0–1.2)
BUN SERPL-MCNC: 12 MG/DL (ref 6–23)
CALCIUM SERPL-MCNC: 8.4 MG/DL (ref 8.6–10.3)
CHLORIDE SERPL-SCNC: 109 MMOL/L (ref 98–107)
CO2 SERPL-SCNC: 28 MMOL/L (ref 21–32)
CREAT SERPL-MCNC: 0.67 MG/DL (ref 0.5–1.05)
EGFRCR SERPLBLD CKD-EPI 2021: >90 ML/MIN/1.73M*2
ERYTHROCYTE [DISTWIDTH] IN BLOOD BY AUTOMATED COUNT: 14.9 % (ref 11.5–14.5)
GLUCOSE SERPL-MCNC: 118 MG/DL (ref 74–99)
HCT VFR BLD AUTO: 29.2 % (ref 36–46)
HGB BLD-MCNC: 9.3 G/DL (ref 12–16)
HOLD SPECIMEN: NORMAL
MAGNESIUM SERPL-MCNC: 1.59 MG/DL (ref 1.6–2.4)
MCH RBC QN AUTO: 29.7 PG (ref 26–34)
MCHC RBC AUTO-ENTMCNC: 31.8 G/DL (ref 32–36)
MCV RBC AUTO: 93 FL (ref 80–100)
NRBC BLD-RTO: 0 /100 WBCS (ref 0–0)
PHOSPHATE SERPL-MCNC: 4.4 MG/DL (ref 2.5–4.9)
PLATELET # BLD AUTO: 66 X10*3/UL (ref 150–450)
POTASSIUM SERPL-SCNC: 4.1 MMOL/L (ref 3.5–5.3)
PROT SERPL-MCNC: 5.5 G/DL (ref 6.4–8.2)
RBC # BLD AUTO: 3.13 X10*6/UL (ref 4–5.2)
SODIUM SERPL-SCNC: 141 MMOL/L (ref 136–145)
WBC # BLD AUTO: 3.9 X10*3/UL (ref 4.4–11.3)

## 2025-07-08 PROCEDURE — 06L38CZ OCCLUSION OF ESOPHAGEAL VEIN WITH EXTRALUMINAL DEVICE, VIA NATURAL OR ARTIFICIAL OPENING ENDOSCOPIC: ICD-10-PCS | Performed by: INTERNAL MEDICINE

## 2025-07-08 PROCEDURE — 2500000004 HC RX 250 GENERAL PHARMACY W/ HCPCS (ALT 636 FOR OP/ED): Performed by: STUDENT IN AN ORGANIZED HEALTH CARE EDUCATION/TRAINING PROGRAM

## 2025-07-08 PROCEDURE — 43239 EGD BIOPSY SINGLE/MULTIPLE: CPT | Performed by: INTERNAL MEDICINE

## 2025-07-08 PROCEDURE — 80076 HEPATIC FUNCTION PANEL: CPT | Performed by: STUDENT IN AN ORGANIZED HEALTH CARE EDUCATION/TRAINING PROGRAM

## 2025-07-08 PROCEDURE — 2500000001 HC RX 250 WO HCPCS SELF ADMINISTERED DRUGS (ALT 637 FOR MEDICARE OP): Performed by: STUDENT IN AN ORGANIZED HEALTH CARE EDUCATION/TRAINING PROGRAM

## 2025-07-08 PROCEDURE — 3700000001 HC GENERAL ANESTHESIA TIME - INITIAL BASE CHARGE

## 2025-07-08 PROCEDURE — 85027 COMPLETE CBC AUTOMATED: CPT | Performed by: STUDENT IN AN ORGANIZED HEALTH CARE EDUCATION/TRAINING PROGRAM

## 2025-07-08 PROCEDURE — 36415 COLL VENOUS BLD VENIPUNCTURE: CPT | Performed by: STUDENT IN AN ORGANIZED HEALTH CARE EDUCATION/TRAINING PROGRAM

## 2025-07-08 PROCEDURE — 99232 SBSQ HOSP IP/OBS MODERATE 35: CPT | Performed by: STUDENT IN AN ORGANIZED HEALTH CARE EDUCATION/TRAINING PROGRAM

## 2025-07-08 PROCEDURE — 2500000004 HC RX 250 GENERAL PHARMACY W/ HCPCS (ALT 636 FOR OP/ED): Performed by: INTERNAL MEDICINE

## 2025-07-08 PROCEDURE — 0DB78ZX EXCISION OF STOMACH, PYLORUS, VIA NATURAL OR ARTIFICIAL OPENING ENDOSCOPIC, DIAGNOSTIC: ICD-10-PCS | Performed by: INTERNAL MEDICINE

## 2025-07-08 PROCEDURE — 1200000002 HC GENERAL ROOM WITH TELEMETRY DAILY

## 2025-07-08 PROCEDURE — 83735 ASSAY OF MAGNESIUM: CPT | Performed by: STUDENT IN AN ORGANIZED HEALTH CARE EDUCATION/TRAINING PROGRAM

## 2025-07-08 PROCEDURE — 2500000004 HC RX 250 GENERAL PHARMACY W/ HCPCS (ALT 636 FOR OP/ED): Performed by: NURSE ANESTHETIST, CERTIFIED REGISTERED

## 2025-07-08 PROCEDURE — 2720000007 HC OR 272 NO HCPCS

## 2025-07-08 PROCEDURE — 84100 ASSAY OF PHOSPHORUS: CPT | Performed by: STUDENT IN AN ORGANIZED HEALTH CARE EDUCATION/TRAINING PROGRAM

## 2025-07-08 PROCEDURE — 84132 ASSAY OF SERUM POTASSIUM: CPT | Performed by: STUDENT IN AN ORGANIZED HEALTH CARE EDUCATION/TRAINING PROGRAM

## 2025-07-08 PROCEDURE — 43244 EGD VARICES LIGATION: CPT | Performed by: INTERNAL MEDICINE

## 2025-07-08 PROCEDURE — 3700000002 HC GENERAL ANESTHESIA TIME - EACH INCREMENTAL 1 MINUTE

## 2025-07-08 RX ORDER — MAGNESIUM SULFATE HEPTAHYDRATE 40 MG/ML
2 INJECTION, SOLUTION INTRAVENOUS ONCE
Status: COMPLETED | OUTPATIENT
Start: 2025-07-08 | End: 2025-07-08

## 2025-07-08 RX ORDER — LIDOCAINE HYDROCHLORIDE 20 MG/ML
INJECTION, SOLUTION INFILTRATION; PERINEURAL AS NEEDED
Status: DISCONTINUED | OUTPATIENT
Start: 2025-07-08 | End: 2025-07-08

## 2025-07-08 RX ORDER — CARVEDILOL 6.25 MG/1
6.25 TABLET ORAL NIGHTLY
Status: DISCONTINUED | OUTPATIENT
Start: 2025-07-08 | End: 2025-07-09 | Stop reason: HOSPADM

## 2025-07-08 RX ORDER — PROPOFOL 10 MG/ML
INJECTION, EMULSION INTRAVENOUS AS NEEDED
Status: DISCONTINUED | OUTPATIENT
Start: 2025-07-08 | End: 2025-07-08

## 2025-07-08 RX ADMIN — LIDOCAINE HYDROCHLORIDE 5 ML: 20 INJECTION, SOLUTION INFILTRATION; PERINEURAL at 14:00

## 2025-07-08 RX ADMIN — PANTOPRAZOLE SODIUM 40 MG: 40 INJECTION, POWDER, FOR SOLUTION INTRAVENOUS at 08:43

## 2025-07-08 RX ADMIN — OCTREOTIDE ACETATE 50 MCG/HR: 500 INJECTION, SOLUTION INTRAVENOUS; SUBCUTANEOUS at 01:23

## 2025-07-08 RX ADMIN — PROPOFOL 50 MG: 10 INJECTION, EMULSION INTRAVENOUS at 14:03

## 2025-07-08 RX ADMIN — SODIUM CHLORIDE, SODIUM LACTATE, POTASSIUM CHLORIDE, AND CALCIUM CHLORIDE: .6; .31; .03; .02 INJECTION, SOLUTION INTRAVENOUS at 13:43

## 2025-07-08 RX ADMIN — MAGNESIUM SULFATE HEPTAHYDRATE 2 G: 40 INJECTION, SOLUTION INTRAVENOUS at 08:43

## 2025-07-08 RX ADMIN — MORPHINE SULFATE 4 MG: 4 INJECTION, SOLUTION INTRAMUSCULAR; INTRAVENOUS at 08:43

## 2025-07-08 RX ADMIN — PANTOPRAZOLE SODIUM 40 MG: 40 INJECTION, POWDER, FOR SOLUTION INTRAVENOUS at 20:59

## 2025-07-08 RX ADMIN — PROPOFOL 100 MG: 10 INJECTION, EMULSION INTRAVENOUS at 14:00

## 2025-07-08 RX ADMIN — CARVEDILOL 6.25 MG: 6.25 TABLET, FILM COATED ORAL at 20:57

## 2025-07-08 RX ADMIN — PROPOFOL 50 MG: 10 INJECTION, EMULSION INTRAVENOUS at 14:05

## 2025-07-08 RX ADMIN — MORPHINE SULFATE 4 MG: 4 INJECTION, SOLUTION INTRAMUSCULAR; INTRAVENOUS at 17:56

## 2025-07-08 RX ADMIN — OCTREOTIDE ACETATE 50 MCG/HR: 500 INJECTION, SOLUTION INTRAVENOUS; SUBCUTANEOUS at 12:40

## 2025-07-08 SDOH — HEALTH STABILITY: MENTAL HEALTH: CURRENT SMOKER: 0

## 2025-07-08 ASSESSMENT — PAIN SCALES - GENERAL
PAINLEVEL_OUTOF10: 4
PAINLEVEL_OUTOF10: 1
PAINLEVEL_OUTOF10: 0 - NO PAIN
PAINLEVEL_OUTOF10: 6

## 2025-07-08 ASSESSMENT — PAIN DESCRIPTION - LOCATION
LOCATION: HEAD
LOCATION: HEAD

## 2025-07-08 ASSESSMENT — PAIN - FUNCTIONAL ASSESSMENT
PAIN_FUNCTIONAL_ASSESSMENT: 0-10

## 2025-07-08 ASSESSMENT — COGNITIVE AND FUNCTIONAL STATUS - GENERAL
MOBILITY SCORE: 24
DAILY ACTIVITIY SCORE: 24

## 2025-07-08 ASSESSMENT — PAIN DESCRIPTION - DESCRIPTORS
DESCRIPTORS: ACHING;PRESSURE
DESCRIPTORS: PRESSURE
DESCRIPTORS: PRESSURE

## 2025-07-08 ASSESSMENT — ACTIVITIES OF DAILY LIVING (ADL): LACK_OF_TRANSPORTATION: NO

## 2025-07-08 NOTE — CARE PLAN
The patient's goals for the shift include rest and comfort    The clinical goals for the shift include Patient will show no s/s of bleed throughout shift.    Problem: Pain - Adult  Goal: Verbalizes/displays adequate comfort level or baseline comfort level  Outcome: Progressing     Problem: Chronic Conditions and Co-morbidities  Goal: Patient's chronic conditions and co-morbidity symptoms are monitored and maintained or improved  Outcome: Progressing

## 2025-07-08 NOTE — ANESTHESIA PREPROCEDURE EVALUATION
Daylin Tyler is a 65 y.o. female here for:    Esophagogastroduodenoscopy (EGD)  With Porfirio Elizabeth MD  Acute upper GI bleed  Epigastric pain  Nausea and vomiting, unspecified vomiting type  Cirrhosis of liver without ascites, unspecified hepatic cirrhosis type (Multi)    Relevant Problems   Cardiac   (+) Hypertension associated with type 2 diabetes mellitus      GI   (+) Acute upper GI bleed   (+) GERD (gastroesophageal reflux disease)      Endocrine   (+) Class 2 severe obesity due to excess calories with serious comorbidity and body mass index (BMI) of 37.0 to 37.9 in adult   (+) Type 2 diabetes mellitus with circulatory disorder, without long-term current use of insulin      Hematology   (+) Iron deficiency anemia   (+) Thrombocytopenia      Musculoskeletal   (+) Fibromyalgia      ID   (+) Tinea versicolor      Skin   (+) Tinea versicolor       Lab Results   Component Value Date    HGB 9.3 (L) 07/08/2025    HGB 10.9 (L) 04/09/2025    HCT 29.2 (L) 07/08/2025    HCT 35.3 04/09/2025    WBC 3.9 (L) 07/08/2025    WBC 3.8 04/09/2025    PLT 66 (L) 07/08/2025    PLT 68 (L) 04/09/2025     07/08/2025    K 4.1 07/08/2025     (H) 07/08/2025    CREATININE 0.67 07/08/2025    BUN 12 07/08/2025       Tobacco Use History[1]    RX Allergies[2]    Current Outpatient Medications   Medication Instructions    belimumab 10 mg/kg in sodium chloride 0.9% IVPB 10 mg/kg, intravenous, Every 28 days, 818 mg in Nacl 0.9% 250 ml @ 250 ml/hr.    carvedilol (COREG) 6.25 mg, oral, Nightly    esomeprazole (NEXIUM) 40 mg, oral, Daily    ferrous sulfate 325 (65 Fe) mg EC tablet 1 tablet, oral, Daily with breakfast, Do not crush, chew, or split.    fluticasone (Flonase) 50 mcg/actuation nasal spray 1 spray, Each Nostril, Daily, Shake gently. Before first use, prime pump. After use, clean tip and replace cap.    folic acid (FOLVITE) 1 mg, oral, Daily    lisinopril 30 mg, oral, Daily    PlaqueniL 200 mg, 2 times daily    pregabalin (LYRICA)  150 mg, oral, 3 times daily PRN    semaglutide (RYBELSUS) 7 mg, oral, Daily       Surgical History[3]    Family History[4]    NPO Details:  No data recorded    Physical Exam  Airway  Mallampati: III  TM distance: >3 FB    Cardiovascular - normal exam  Dental - normal exam  Pulmonary - normal exam  Neurological   Abdominal         Anesthesia Plan    History of general anesthesia?: yes  History of complications of general anesthesia?: no    ASA 3 - emergent     MAC     The patient is not a current smoker.    intravenous induction   Anesthetic plan and risks discussed with patient.    Plan discussed with CRNA.             [1]   Social History  Tobacco Use   Smoking Status Never   Smokeless Tobacco Never   [2]   Allergies  Allergen Reactions    Metformin GI Upset     Side effects stopped when medication was discontinued    Sulfamethoxazole Itching and Swelling   [3]   Past Surgical History:  Procedure Laterality Date     SECTION, CLASSIC       section    ENDOMETRIAL ABLATION      Ablation    EYE SURGERY      Eye surgery   [4]   Family History  Problem Relation Name Age of Onset    Glaucoma Mother      Hypertension Mother      Diabetes Father      Glaucoma Father      Other (sarcoma) Father

## 2025-07-08 NOTE — PROGRESS NOTES
07/08/25 1553   Discharge Planning   Living Arrangements Spouse/significant other   Support Systems Spouse/significant other   Type of Residence Private residence   Who is requesting discharge planning? Provider   Expected Discharge Disposition Home   Does the patient need discharge transport arranged? No   Financial Resource Strain   How hard is it for you to pay for the very basics like food, housing, medical care, and heating? Not hard   Housing Stability   In the last 12 months, was there a time when you were not able to pay the mortgage or rent on time? N   At any time in the past 12 months, were you homeless or living in a shelter (including now)? N   Transportation Needs   In the past 12 months, has lack of transportation kept you from medical appointments or from getting medications? no   In the past 12 months, has lack of transportation kept you from meetings, work, or from getting things needed for daily living? No   Intensity of Service   Intensity of Service 0-30 min     Met with pt to discuss dc planning. Pt lives with spouse. Working, independent with ADL's & IADL's. Drives. EGD ord. Plan for dc home. No dc needs currently identified.

## 2025-07-08 NOTE — ANESTHESIA POSTPROCEDURE EVALUATION
Patient: Daylin Tyler    Procedure Summary       Date: 07/08/25 Room / Location: [facility]    Anesthesia Start: 1355 Anesthesia Stop: 1414    Procedure: EGD Diagnosis:       Acute upper GI bleed      Epigastric pain      Nausea and vomiting, unspecified vomiting type      Cirrhosis of liver without ascites, unspecified hepatic cirrhosis type (Multi)    Scheduled Providers: Porfirio Elizabeth MD; Rupesh Long DO; Elliott Garcia RN Responsible Provider: Rupesh Long DO    Anesthesia Type: MAC ASA Status: 3 - Emergent            Anesthesia Type: MAC    Anesthesia Post Evaluation    Patient location during evaluation: bedside  Patient participation: complete - patient participated  Level of consciousness: awake and alert  Pain management: adequate  Airway patency: patent  Cardiovascular status: acceptable  Respiratory status: acceptable  Hydration status: acceptable  Postoperative Nausea and Vomiting: none        No notable events documented.

## 2025-07-08 NOTE — PROGRESS NOTES
Daylin Tyler is a 65 y.o. female on day 1 of admission presenting with Acute upper GI bleed.      Subjective   Currently hemodynamically stable, no acute distress, lying down on her bed, n.p.o. for EGD later today, following:, On room air, complaining of mild generalized abdominal discomfort    Objective     Last Recorded Vitals  /66 (BP Location: Right arm, Patient Position: Lying)   Pulse 65   Temp 36.5 °C (97.7 °F) (Temporal)   Resp 16   Wt 80.7 kg (178 lb)   SpO2 100%   Intake/Output last 3 Shifts:    Intake/Output Summary (Last 24 hours) at 7/8/2025 1123  Last data filed at 7/7/2025 2024  Gross per 24 hour   Intake 1082.67 ml   Output --   Net 1082.67 ml       Admission Weight  Weight: 78.5 kg (173 lb) (07/07/25 1101)    Daily Weight  07/07/25 : 80.7 kg (178 lb)    Image Results  CT angio abdomen pelvis w and or wo IV IV contrast  Narrative: STUDY:  CT CTA ABDOMEN and PELVIS w + wo CONTRAST; 7/7/2025 12:34 PM  INDICATION:  Evaluate for a GI bleed.  TECHNIQUE:  Helical CT is performed from the aortic diaphragmatic  hiatus through the symphysis pubis before and after bolus  administration of 100 mL of Omnipaque 350.  Images were reviewed and  processed at a workstation according to the CT angiogram protocol with  3-D and/or MIP post processing imaging generated.    Automated mA/kV exposure control was utilized and patient examination  was performed in strict accordance with principles of ALARA.  RADIATION AMOUNT:  2559.5 mGy-cm.  COMPARISON:  US abdomen 04/10/2025.  FINDINGS:   There is a nodular contour of the liver, indicative of cirrhosis.  The  portal and hepatic veins are patent.  There is no intrahepatic ductal  dilatation.  Gallstones are present.  There is mild gallbladder wall  thickening.  This may be secondary underlying cirrhosis.  The spleen  is enlarged at 14.6 cm.  The splenic vein is patent.  No inflammatory  changes are seen surrounding the pancreas.  No adrenal nodule is  noted.  No  acute findings are seen within either kidney.  There is no  evidence of hydronephrosis.  No dilated loops of small bowel or colon  are visualized.  There are diverticula seen in the sigmoid colon.   There is no evidence of diverticulitis.  There are no areas of  contrast extravasation within the small bowel or colonic loops to  indicate an active arterial bleed.  No pooling contrast is seen on the  delayed images.  The appendix is normal in appearance.  A hiatal  hernia is present.  No enlarged lymph nodes are seen within the pelvic  sidewalls, iliac chains, or retroperitoneum.  No prominent edema is  seen within the psoas musculature.  Degenerative changes are seen  throughout the lumbar spine.  There is no evidence of aneurysmal dilatation of the abdominal aorta.   There is a mild stenosis at the origin of the celiac artery.  The  superior mesenteric and inferior mesenteric arteries are patent.   There is a single right renal artery which is patent.  There are 2  left renal arteries which are patent.  Impression: 1.  No areas of contrast extravasation within the small bowel or  colonic loops to indicate an active arterial bleed.  2.  Sigmoid diverticulosis, with no evidence of diverticulitis.  3.  Cirrhotic morphology of the liver.  4.  Splenomegaly.  5.  Hiatal hernia.  6.  Cholelithiasis.  Mild gallbladder wall thickening.  This is most  likely related to underlying cirrhosis.  If acute cholecystitis is  suspected, follow-up with a HIDA scan is recommended.  7.  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  Signed by Abraham Gamboa MD      Physical Exam    General: Well-developed obese female, in no acute distress  HEENT: AT, NC, no JVD, no lymphadenopathy, neck supple  Lungs: Clear, no wheezing, no crackles  Cardiac: Normal S1-S2, no murmur, no gallop  Abdomen: Soft, obese, nontender, no distention, positive bowel sound  Extremities: No deformity, no edema, pulses intact, ROM intact  Neurological: Alert  awake oriented x3, sensation intact, clear speech            Assessment & Plan  Acute upper GI bleed        Daylin Tyler is a 65 y.o. female admitted to [facility] for management of:     Hematemesis  Hx of esophageal varices  Cirrhosis  - CTA A/P did not have findings suggestive of active bleed. It did reveal cirrhosis, hepatosplenomegaly  - Continue IV Protonix twice daily, antiemetic, pain management, IVF as needed  - Continue octreotide infusion  - Monitor CBC, transfuse to keep Hgb >7  - GI recs appreciated, plan for EGD later today, currently n.p.o.     Continue home meds for other comorbidities  VTE Prophylaxis: SCDs  Disposition: Home once hemodynamically stable  Partner at bedside and all questions answered.        Anthony Duggan MD

## 2025-07-09 ENCOUNTER — APPOINTMENT (OUTPATIENT)
Dept: CARDIOLOGY | Facility: HOSPITAL | Age: 65
DRG: 432 | End: 2025-07-09
Payer: COMMERCIAL

## 2025-07-09 ENCOUNTER — APPOINTMENT (OUTPATIENT)
Dept: RADIOLOGY | Facility: HOSPITAL | Age: 65
DRG: 432 | End: 2025-07-09
Payer: COMMERCIAL

## 2025-07-09 VITALS
WEIGHT: 178 LBS | HEIGHT: 60 IN | HEART RATE: 62 BPM | SYSTOLIC BLOOD PRESSURE: 143 MMHG | TEMPERATURE: 97.7 F | OXYGEN SATURATION: 98 % | RESPIRATION RATE: 18 BRPM | DIASTOLIC BLOOD PRESSURE: 64 MMHG | BODY MASS INDEX: 34.95 KG/M2

## 2025-07-09 LAB
ALBUMIN SERPL BCP-MCNC: 3.2 G/DL (ref 3.4–5)
ALP SERPL-CCNC: 160 U/L (ref 33–136)
ALT SERPL W P-5'-P-CCNC: 28 U/L (ref 7–45)
ANION GAP SERPL CALC-SCNC: 10 MMOL/L (ref 10–20)
AST SERPL W P-5'-P-CCNC: 37 U/L (ref 9–39)
ATRIAL RATE: 68 BPM
BILIRUB DIRECT SERPL-MCNC: 0.6 MG/DL (ref 0–0.3)
BILIRUB SERPL-MCNC: 1.3 MG/DL (ref 0–1.2)
BUN SERPL-MCNC: 12 MG/DL (ref 6–23)
CALCIUM SERPL-MCNC: 8.4 MG/DL (ref 8.6–10.3)
CHLORIDE SERPL-SCNC: 104 MMOL/L (ref 98–107)
CO2 SERPL-SCNC: 29 MMOL/L (ref 21–32)
CREAT SERPL-MCNC: 0.61 MG/DL (ref 0.5–1.05)
EGFRCR SERPLBLD CKD-EPI 2021: >90 ML/MIN/1.73M*2
ERYTHROCYTE [DISTWIDTH] IN BLOOD BY AUTOMATED COUNT: 14.3 % (ref 11.5–14.5)
GLUCOSE SERPL-MCNC: 116 MG/DL (ref 74–99)
HCT VFR BLD AUTO: 29 % (ref 36–46)
HGB BLD-MCNC: 9.4 G/DL (ref 12–16)
HOLD SPECIMEN: NORMAL
MAGNESIUM SERPL-MCNC: 1.77 MG/DL (ref 1.6–2.4)
MCH RBC QN AUTO: 30 PG (ref 26–34)
MCHC RBC AUTO-ENTMCNC: 32.4 G/DL (ref 32–36)
MCV RBC AUTO: 93 FL (ref 80–100)
NRBC BLD-RTO: 0 /100 WBCS (ref 0–0)
P AXIS: 17 DEGREES
P OFFSET: 179 MS
P ONSET: 136 MS
PHOSPHATE SERPL-MCNC: 3.9 MG/DL (ref 2.5–4.9)
PLATELET # BLD AUTO: 71 X10*3/UL (ref 150–450)
POTASSIUM SERPL-SCNC: 3.9 MMOL/L (ref 3.5–5.3)
PR INTERVAL: 162 MS
PROT SERPL-MCNC: 5.7 G/DL (ref 6.4–8.2)
Q ONSET: 217 MS
QRS COUNT: 11 BEATS
QRS DURATION: 84 MS
QT INTERVAL: 428 MS
QTC CALCULATION(BAZETT): 455 MS
QTC FREDERICIA: 446 MS
R AXIS: 12 DEGREES
RBC # BLD AUTO: 3.13 X10*6/UL (ref 4–5.2)
SODIUM SERPL-SCNC: 139 MMOL/L (ref 136–145)
T AXIS: 13 DEGREES
T OFFSET: 431 MS
VENTRICULAR RATE: 68 BPM
WBC # BLD AUTO: 4.2 X10*3/UL (ref 4.4–11.3)

## 2025-07-09 PROCEDURE — 83735 ASSAY OF MAGNESIUM: CPT | Performed by: STUDENT IN AN ORGANIZED HEALTH CARE EDUCATION/TRAINING PROGRAM

## 2025-07-09 PROCEDURE — 84100 ASSAY OF PHOSPHORUS: CPT | Performed by: STUDENT IN AN ORGANIZED HEALTH CARE EDUCATION/TRAINING PROGRAM

## 2025-07-09 PROCEDURE — 84075 ASSAY ALKALINE PHOSPHATASE: CPT | Performed by: STUDENT IN AN ORGANIZED HEALTH CARE EDUCATION/TRAINING PROGRAM

## 2025-07-09 PROCEDURE — 2500000004 HC RX 250 GENERAL PHARMACY W/ HCPCS (ALT 636 FOR OP/ED): Performed by: STUDENT IN AN ORGANIZED HEALTH CARE EDUCATION/TRAINING PROGRAM

## 2025-07-09 PROCEDURE — 74220 X-RAY XM ESOPHAGUS 1CNTRST: CPT

## 2025-07-09 PROCEDURE — 85027 COMPLETE CBC AUTOMATED: CPT | Performed by: STUDENT IN AN ORGANIZED HEALTH CARE EDUCATION/TRAINING PROGRAM

## 2025-07-09 PROCEDURE — 99232 SBSQ HOSP IP/OBS MODERATE 35: CPT | Performed by: NURSE PRACTITIONER

## 2025-07-09 PROCEDURE — 93005 ELECTROCARDIOGRAM TRACING: CPT

## 2025-07-09 PROCEDURE — 36415 COLL VENOUS BLD VENIPUNCTURE: CPT | Performed by: STUDENT IN AN ORGANIZED HEALTH CARE EDUCATION/TRAINING PROGRAM

## 2025-07-09 PROCEDURE — 74220 X-RAY XM ESOPHAGUS 1CNTRST: CPT | Performed by: RADIOLOGY

## 2025-07-09 PROCEDURE — 80053 COMPREHEN METABOLIC PANEL: CPT | Performed by: STUDENT IN AN ORGANIZED HEALTH CARE EDUCATION/TRAINING PROGRAM

## 2025-07-09 PROCEDURE — A9698 NON-RAD CONTRAST MATERIALNOC: HCPCS | Performed by: STUDENT IN AN ORGANIZED HEALTH CARE EDUCATION/TRAINING PROGRAM

## 2025-07-09 PROCEDURE — 99239 HOSP IP/OBS DSCHRG MGMT >30: CPT | Performed by: STUDENT IN AN ORGANIZED HEALTH CARE EDUCATION/TRAINING PROGRAM

## 2025-07-09 PROCEDURE — 2500000005 HC RX 250 GENERAL PHARMACY W/O HCPCS: Performed by: STUDENT IN AN ORGANIZED HEALTH CARE EDUCATION/TRAINING PROGRAM

## 2025-07-09 PROCEDURE — 2500000001 HC RX 250 WO HCPCS SELF ADMINISTERED DRUGS (ALT 637 FOR MEDICARE OP): Performed by: STUDENT IN AN ORGANIZED HEALTH CARE EDUCATION/TRAINING PROGRAM

## 2025-07-09 RX ORDER — PANTOPRAZOLE SODIUM 40 MG/1
40 TABLET, DELAYED RELEASE ORAL 2 TIMES DAILY
Qty: 180 TABLET | Refills: 3 | Status: SHIPPED | OUTPATIENT
Start: 2025-07-09

## 2025-07-09 RX ORDER — PANTOPRAZOLE SODIUM 40 MG/10ML
40 INJECTION, POWDER, LYOPHILIZED, FOR SOLUTION INTRAVENOUS 2 TIMES DAILY
Status: DISCONTINUED | OUTPATIENT
Start: 2025-07-09 | End: 2025-07-09 | Stop reason: HOSPADM

## 2025-07-09 RX ADMIN — BARIUM SULFATE 700 MG: 700 TABLET ORAL at 11:28

## 2025-07-09 RX ADMIN — PANTOPRAZOLE SODIUM 40 MG: 40 INJECTION, POWDER, FOR SOLUTION INTRAVENOUS at 09:54

## 2025-07-09 RX ADMIN — BARIUM SULFATE 50 ML: 960 POWDER, FOR SUSPENSION ORAL at 11:29

## 2025-07-09 RX ADMIN — BARIUM SULFATE 100 ML: 980 POWDER, FOR SUSPENSION ORAL at 11:29

## 2025-07-09 RX ADMIN — ANTACID/ANTIFLATULENT 1 PACKET: 380; 550; 10; 10 GRANULE, EFFERVESCENT ORAL at 11:28

## 2025-07-09 ASSESSMENT — COGNITIVE AND FUNCTIONAL STATUS - GENERAL
DAILY ACTIVITIY SCORE: 24
MOBILITY SCORE: 24
DAILY ACTIVITIY SCORE: 24
MOBILITY SCORE: 24

## 2025-07-09 ASSESSMENT — PAIN SCALES - GENERAL
PAINLEVEL_OUTOF10: 2
PAINLEVEL_OUTOF10: 0 - NO PAIN

## 2025-07-09 NOTE — CARE PLAN
Problem: Pain - Adult  Goal: Verbalizes/displays adequate comfort level or baseline comfort level  7/9/2025 1539 by Julieta Meraz RN  Outcome: Adequate for Discharge  7/9/2025 0754 by Julieta Meraz RN  Flowsheets (Taken 7/9/2025 0754)  Verbalizes/displays adequate comfort level or baseline comfort level:   Encourage patient to monitor pain and request assistance   Administer analgesics based on type and severity of pain and evaluate response   Consider cultural and social influences on pain and pain management   Assess pain using appropriate pain scale   Implement non-pharmacological measures as appropriate and evaluate response   Notify Licensed Independent Practitioner if interventions unsuccessful or patient reports new pain     Problem: Safety - Adult  Goal: Free from fall injury  7/9/2025 1539 by Julieta Meraz RN  Outcome: Adequate for Discharge  7/9/2025 0754 by Julieta Meraz RN  Flowsheets (Taken 7/9/2025 0754)  Free from fall injury: Instruct family/caregiver on patient safety     Problem: Discharge Planning  Goal: Discharge to home or other facility with appropriate resources  7/9/2025 1539 by Julieta Meraz RN  Outcome: Adequate for Discharge  7/9/2025 0754 by Julieta Meraz RN  Flowsheets (Taken 7/9/2025 0754)  Discharge to home or other facility with appropriate resources:   Identify barriers to discharge with patient and caregiver   Identify discharge learning needs (meds, wound care, etc)   Refer to discharge planning if patient needs post-hospital services based on physician order or complex needs related to functional status, cognitive ability or social support system   Arrange for needed discharge resources and transportation as appropriate     Problem: Chronic Conditions and Co-morbidities  Goal: Patient's chronic conditions and co-morbidity symptoms are monitored and maintained or improved  7/9/2025 1539 by Julieta Meraz RN  Outcome: Adequate for Discharge  7/9/2025 0754 by  Julieta Meraz RN  Flowsheets (Taken 7/9/2025 0754)  Care Plan - Patient's Chronic Conditions and Co-Morbidity Symptoms are Monitored and Maintained or Improved:   Monitor and assess patient's chronic conditions and comorbid symptoms for stability, deterioration, or improvement   Collaborate with multidisciplinary team to address chronic and comorbid conditions and prevent exacerbation or deterioration   Update acute care plan with appropriate goals if chronic or comorbid symptoms are exacerbated and prevent overall improvement and discharge     Problem: Nutrition  Goal: Nutrient intake appropriate for maintaining nutritional needs  Outcome: Adequate for Discharge     Problem: Fall/Injury  Goal: Not fall by end of shift  Outcome: Adequate for Discharge  Goal: Be free from injury by end of the shift  Outcome: Adequate for Discharge  Goal: Verbalize understanding of personal risk factors for fall in the hospital  Outcome: Adequate for Discharge  Goal: Verbalize understanding of risk factor reduction measures to prevent injury from fall in the home  Outcome: Adequate for Discharge  Goal: Use assistive devices by end of the shift  Outcome: Adequate for Discharge  Goal: Pace activities to prevent fatigue by end of the shift  Outcome: Adequate for Discharge     Problem: Diabetes  Goal: Maintain electrolyte levels within acceptable range throughout shift  7/9/2025 1539 by Julieta Meraz RN  Outcome: Adequate for Discharge  7/9/2025 0754 by Julieta Meraz RN  Flowsheets (Taken 7/9/2025 0754)  Maintain electrolyte levels within acceptable range throughout shift:   Med administration/monitoring of effect   Monitor urine output  Goal: Maintain glucose levels >70mg/dl to <250mg/dl throughout shift  7/9/2025 1539 by Julieta Meraz RN  Outcome: Adequate for Discharge  7/9/2025 0754 by Julieta Meraz RN  Flowsheets (Taken 7/9/2025 0754)  Maintain glucose levels >70mg/dl to <250mg/dl throughout shift: Med  administration/monitoring of effect  Goal: Learn about and adhere to nutrition recommendations by end of shift  7/9/2025 1539 by Julieta Meraz RN  Outcome: Adequate for Discharge  7/9/2025 0754 by Julieta Meraz RN  Flowsheets (Taken 7/9/2025 0754)  Learn about and adhere to nutrition recommendations by end of shift: Ensure/encourage compliance with appropriate diet  Goal: Vital signs within normal range for age by end of shift  7/9/2025 1539 by Julieta Meraz RN  Outcome: Adequate for Discharge  7/9/2025 0754 by Julieta Meraz RN  Flowsheets (Taken 7/9/2025 0754)  Vital signs within normal range for age by end of shift: Med administration/monitoring of effect  Goal: Receive DSME education by end of shift  7/9/2025 1539 by Julieta Meraz RN  Outcome: Adequate for Discharge  7/9/2025 0754 by Julieta Meraz RN  Flowsheets (Taken 7/9/2025 0754)  Receive DSME education by end of shift: Provide patient centered education on Diabetic Self Management Education   The patient's goals for the shift include rest and comfort    The clinical goals for the shift include Patient will remain hemodynamically stable throughout this shift.    Over the shift, the patient did make progress adequate for discharge toward care plan goals.

## 2025-07-09 NOTE — DISCHARGE SUMMARY
Discharge Diagnosis  Acute upper GI bleed s/p EGD  History of esophageal varices, cirrhosis      Issues Requiring Follow-Up  Outpatient follow-up with GI clinic in couple weeks and also for repeat EGD    Discharge Meds     Medication List      START taking these medications     pantoprazole 40 mg EC tablet; Commonly known as: ProtoNix; Take 1 tablet   (40 mg) by mouth 2 times a day. Do not crush, chew, or split.     CONTINUE taking these medications     belimumab 10 mg/kg in sodium chloride 0.9% IVPB   carvedilol 6.25 mg tablet; Commonly known as: Coreg; Take 1 tablet (6.25   mg) by mouth once daily at bedtime.   ferrous sulfate 325 (65 Fe) mg EC tablet; Take 1 tablet by mouth once   daily with breakfast. Do not crush, chew, or split.   fluticasone 50 mcg/actuation nasal spray; Commonly known as: Flonase;   Administer 1 spray into each nostril once daily. Shake gently. Before   first use, prime pump. After use, clean tip and replace cap.   folic acid 1 mg tablet; Commonly known as: Folvite; Take 1 tablet (1 mg)   by mouth once daily.   lisinopril 30 mg tablet; Take 1 tablet (30 mg) by mouth once daily.   PlaqueniL 200 mg tablet; Generic drug: hydroxychloroquine   pregabalin 150 mg capsule; Commonly known as: Lyrica   semaglutide 7 mg tablet; Commonly known as: Rybelsus; Take 1 tablet (7   mg) by mouth once daily.     STOP taking these medications     esomeprazole 40 mg DR capsule; Commonly known as: NexIUM       Test Results Pending At Discharge  Pending Labs       No current pending labs.            Hospital Course  Daylin Tyler is a 65 y.o. female admitted to [facility] for management of following issues:     Hematemesis resolved  Hx of esophageal varices  Cirrhosis  - CTA A/P did not have findings suggestive of active bleed. It did reveal cirrhosis, hepatosplenomegaly  - Patient was placed on IV Protonix twice daily, antiemetic, pain management, IVF as needed, octreotide infusion.    - GI consulted, made n.p.o.,  status post EGD and esophagram  EGD 7/8/2025:  Dilation in the distal esophagus with a medium amount of fluid with abnormal mucosa  Three large grade III varices in the middle third of the esophagus and lower third of the esophagus; there was no stigmata of bleeding but given recent bleeding and size and grade of varices decision was made to band. Placed 5 bands successfully, resulting in complete eradication  Mild and friable portal hypertensive gastropathy in the body of the stomach and antrum; performed cold forceps biopsy  The duodenum appeared normal. Performed random biopsy to rule out celiac disease.    Esophagram 7/9/2025: Hiatal hernia probably associated with Schatzki ring, GERD varices, no achalasia, weak peristaltic wave in the esophagus    Postprocedure patient did not have any more bleeding.  Octreotide discontinued.  Started on diet and tolerated very well.  Monitored CBC actively.  GI recommended outpatient follow-up in couple weeks for reevaluation and also scheduling repeat EGD in couple months.     Continued home meds for other comorbidities  VTE Prophylaxis: SCDs ordered      Currently patient is hemodynamically stable and ready for discharge home on Protonix, Coreg and rest of her home medications.  She will follow-up outpatient with GI clinic in couple weeks for reevaluation.  Diet needs to be soft and low-salt and no spice.  She will follow-up with her primary care as needed.   at bedside and all the questions were answered.      Pertinent Physical Exam At Time of Discharge  Physical Exam    General: Well-developed obese female, in no acute distress  HEENT: AT, NC, no JVD, no lymphadenopathy, neck supple  Lungs: Clear, no wheezing, no crackles  Cardiac: Normal S1-S2, no murmur, no gallop  Abdomen: Soft, obese, nontender, no distention, positive bowel sound  Extremities: No deformity, no edema, pulses intact, ROM intact  Neurological: Alert awake oriented x3, sensation intact, clear  speech      Outpatient Follow-Up  Future Appointments   Date Time Provider Department Center   7/14/2025  8:30 AM Regina Romo PA-C [id number] [place]   12/8/2025  8:50 AM Isela Man MD [id number] [place]   2/3/2026 12:40 PM Pasquale JoyaD [id number] [facility]       Time spent 40 minutes  Anthony Duggan MD

## 2025-07-09 NOTE — CARE PLAN
The patient's goals for the shift include rest and comfort    The clinical goals for the shift include pain management      Problem: Pain - Adult  Goal: Verbalizes/displays adequate comfort level or baseline comfort level  Outcome: Progressing

## 2025-07-09 NOTE — PROGRESS NOTES
Daylin Tyler is a 65 y.o. female on day 2 of admission presenting with Acute upper GI bleed.    Subjective   Patient seen and examined at bedside.  Afebrile and hemodynamically stable.  Hemoglobin 9.4.  Platelets 71.  No overt GI bleeding.     Objective   General: Well-developed obese female, in no acute distress  HEENT: AT, NC, no JVD, no lymphadenopathy, neck supple  Lungs: Clear, no wheezing, no crackles  Cardiac: Normal S1-S2, no murmur, no gallop  Abdomen: Soft, obese, nontender, no distention, positive bowel sound  Extremities: No deformity, no edema, pulses intact, ROM intact  Neurological: Alert awake oriented x3, sensation intact, clear speech    Last Recorded Vitals  Blood pressure 143/64, pulse 62, temperature 36.5 °C (97.7 °F), temperature source Temporal, resp. rate 18, height (!) 1.524 m (5'), weight 80.7 kg (178 lb), SpO2 98%.  Intake/Output last 3 Shifts:  I/O last 3 completed shifts:  In: 577.7 (7.2 mL/kg) [P.O.:60; I.V.:417.7 (5.2 mL/kg); IV Piggyback:100]  Out: - (0 mL/kg)   Weight: 80.7 kg     Relevant Results  [place] GI esophagram  Result Date: 7/9/2025  Hiatal hernia (probably with associated Schatzki ring)   Gastroesophageal reflux with provocative/Valsalva maneuver   Filling defects in the distal one third of the thoracic esophagus consistent with varices confirmed on EGD yesterday   No morphologic changes of achalasia, but the primary peristaltic wave in the esophagus becomes weak at the junction of proximal and middle thirds with consequent distal esophageal stasis and retrograde escape   MACRO: None   Signed by: Mukund Nunez 7/9/2025 11:44 AM Dictation workstation:   [id number]    Esophagogastroduodenoscopy (EGD)  Addendum Date: 7/8/2025  Impression Dilation in the distal esophagus with a medium amount of fluid with abnormal mucosa Three large grade III varices in the middle third of the esophagus and lower third of the esophagus; there was no stigmata of bleeding but given recent bleeding  and size and grade of varices decision was made to band. Placed 5 bands successfully, resulting in complete eradication Mild and friable portal hypertensive gastropathy in the body of the stomach and antrum; performed cold forceps biopsy The duodenum appeared normal. Performed random biopsy to rule out celiac disease. Findings Dilation in the distal esophagus with a medium amount of fluid with abnormal mucosa Three large grade III varices in the middle third of the esophagus and lower third of the esophagus; no bleeding was observed; placed 5 bands successfully, resulting in complete eradication Mild and friable portal hypertensive gastropathy in the body of the stomach and antrum; performed cold forceps biopsy The duodenum appeared normal. Performed random biopsy using biopsy forceps to rule out celiac disease. Recommendation Repeat EGD in 2 months, due: 9/6/2025 Continue to monitor for further bleeding. Can stop octreotide drip. Recommend evaluation for possible achalasia as patient does report swallowing problems and her endoscopy may be consistent with a motility problem such as achalasia.  Could start with an esophagram first and then may consider an Endoflip with her next EGD in order to further evaluate both the varices and the motility.  Indication Epigastric pain, Acute upper GI bleed, Cirrhosis of liver without ascites, unspecified hepatic cirrhosis type (Multi), Nausea and vomiting, unspecified vomiting type Post-Op Diagnosis None Staff Staff Role Porfirio Elizabeth MD Proceduralist Medications See Anesthesia Record. Preprocedure A history and physical has been performed, and patient medication allergies have been reviewed. The patient's tolerance of previous anesthesia has been reviewed. The risks and benefits of the procedure and the sedation options and risks were discussed with the patient. All questions were answered and informed consent obtained. Details of the Procedure The patient underwent monitored  anesthesia care, which was administered by an anesthesia professional. The patient's blood pressure, ECG, ETCO2, heart rate, level of consciousness, oxygen and respirations were monitored throughout the procedure. The scope was introduced through the mouth and advanced to the second part of the duodenum. Retroflexion was performed in the cardia. The patient experienced no blood loss. The procedure was not difficult. The patient tolerated the procedure well. There were no apparent adverse events. Events Procedure Events Event Event Time ENDO SCOPE IN TIME 7/8/2025  2:00 PM ENDO SCOPE OUT TIME 7/8/2025  2:08 PM Specimens No specimens collected Procedure Location [facility] [address] [id number] [phone] Referring Provider CHRISTINA Mcnamara-CNP Procedure Provider Porfirio Elizabeth MD    CT angio abdomen pelvis w and or wo IV IV contrast  Result Date: 7/7/2025  1.  No areas of contrast extravasation within the small bowel or colonic loops to indicate an active arterial bleed. 2.  Sigmoid diverticulosis, with no evidence of diverticulitis. 3.  Cirrhotic morphology of the liver. 4.  Splenomegaly. 5.  Hiatal hernia. 6.  Cholelithiasis.  Mild gallbladder wall thickening.  This is most likely related to underlying cirrhosis.  If acute cholecystitis is suspected, follow-up with a HIDA scan is recommended. 7.  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 Signed by Abraham Gamboa MD      Cardiology, Vascular, and Other Imaging  Esophagogastroduodenoscopy (EGD)  Addendum Date: 7/8/2025  Impression Dilation in the distal esophagus with a medium amount of fluid with abnormal mucosa Three large grade III varices in the middle third of the esophagus and lower third of the esophagus; there was no stigmata of bleeding but given recent bleeding and size and grade of varices decision was made to band. Placed 5 bands successfully, resulting in complete eradication Mild and friable  portal hypertensive gastropathy in the body of the stomach and antrum; performed cold forceps biopsy The duodenum appeared normal. Performed random biopsy to rule out celiac disease. Findings Dilation in the distal esophagus with a medium amount of fluid with abnormal mucosa Three large grade III varices in the middle third of the esophagus and lower third of the esophagus; no bleeding was observed; placed 5 bands successfully, resulting in complete eradication Mild and friable portal hypertensive gastropathy in the body of the stomach and antrum; performed cold forceps biopsy The duodenum appeared normal. Performed random biopsy using biopsy forceps to rule out celiac disease. Recommendation Repeat EGD in 2 months, due: 9/6/2025 Continue to monitor for further bleeding. Can stop octreotide drip. Recommend evaluation for possible achalasia as patient does report swallowing problems and her endoscopy may be consistent with a motility problem such as achalasia.  Could start with an esophagram first and then may consider an Endoflip with her next EGD in order to further evaluate both the varices and the motility.  Indication Epigastric pain, Acute upper GI bleed, Cirrhosis of liver without ascites, unspecified hepatic cirrhosis type (Multi), Nausea and vomiting, unspecified vomiting type Post-Op Diagnosis None Staff Staff Role Porfirio Elizabeth MD Proceduralist Medications See Anesthesia Record. Preprocedure A history and physical has been performed, and patient medication allergies have been reviewed. The patient's tolerance of previous anesthesia has been reviewed. The risks and benefits of the procedure and the sedation options and risks were discussed with the patient. All questions were answered and informed consent obtained. Details of the Procedure The patient underwent monitored anesthesia care, which was administered by an anesthesia professional. The patient's blood pressure, ECG, ETCO2, heart rate, level of  consciousness, oxygen and respirations were monitored throughout the procedure. The scope was introduced through the mouth and advanced to the second part of the duodenum. Retroflexion was performed in the cardia. The patient experienced no blood loss. The procedure was not difficult. The patient tolerated the procedure well. There were no apparent adverse events. Events Procedure Events Event Event Time ENDO SCOPE IN TIME 7/8/2025  2:00 PM ENDO SCOPE OUT TIME 7/8/2025  2:08 PM Specimens No specimens collected Procedure Location [facility] [address] [facility] [address] [phone] Referring Provider CHRISTINA Mcnamara-CNP Procedure Provider Porfirio Elizabeth MD    Result Date: 7/8/2025  Table formatting from the original result was not included. Impression Dilation in the distal esophagus with a medium amount of fluid with abnormal mucosa Three large grade III varices in the middle third of the esophagus and lower third of the esophagus; there was no stigmata of bleeding but given recent bleeding and size and grade of varices decision was made to band. Placed 5 bands successfully, resulting in complete eradication Mild and friable portal hypertensive gastropathy in the body of the stomach and antrum; performed cold forceps biopsy The duodenum appeared normal. Performed random biopsy to rule out celiac disease. Findings Dilation in the distal esophagus with a medium amount of fluid with abnormal mucosa Three large grade III varices in the middle third of the esophagus and lower third of the esophagus; no bleeding was observed; placed 5 bands successfully, resulting in complete eradication Mild and friable portal hypertensive gastropathy in the body of the stomach and antrum; performed cold forceps biopsy The duodenum appeared normal. Performed random biopsy using biopsy forceps to rule out celiac disease. Recommendation Repeat EGD in 2 months, due: 9/6/2025 Continue to monitor  for further bleeding. Can stop octreotide drip. Can consider further evaluation for possible achalasia if there is any concerning symptoms to explain the abnormality of the esophageal dilation.  Indication Epigastric pain, Acute upper GI bleed, Cirrhosis of liver without ascites, unspecified hepatic cirrhosis type (Multi), Nausea and vomiting, unspecified vomiting type Post-Op Diagnosis None Staff Staff Role Porfirio Elizabeth MD Proceduralist Medications See Anesthesia Record. Preprocedure A history and physical has been performed, and patient medication allergies have been reviewed. The patient's tolerance of previous anesthesia has been reviewed. The risks and benefits of the procedure and the sedation options and risks were discussed with the patient. All questions were answered and informed consent obtained. Details of the Procedure The patient underwent monitored anesthesia care, which was administered by an anesthesia professional. The patient's blood pressure, ECG, ETCO2, heart rate, level of consciousness, oxygen and respirations were monitored throughout the procedure. The scope was introduced through the mouth and advanced to the second part of the duodenum. Retroflexion was performed in the cardia. The patient experienced no blood loss. The procedure was not difficult. The patient tolerated the procedure well. There were no apparent adverse events. Events Procedure Events Event Event Time ENDO SCOPE IN TIME 7/8/2025  2:00 PM ENDO SCOPE OUT TIME 7/8/2025  2:08 PM Specimens No specimens collected Procedure Location [address] [address] [phone] Referring Provider Ros Menjivar APRN-CNP Procedure Provider Porfirio Elizabeth MD       Lab Results   Component Value Date    WBC 4.2 (L) 07/09/2025    HGB 9.4 (L) 07/09/2025    HCT 29.0 (L) 07/09/2025    MCV 93 07/09/2025    PLT 71 (L) 07/09/2025     Lab Results   Component Value Date    ALT 28 07/09/2025     AST 37 07/09/2025    ALKPHOS 160 (H) 07/09/2025    BILITOT 1.3 (H) 07/09/2025     Lab Results   Component Value Date    GLUCOSE 116 (H) 07/09/2025    CALCIUM 8.4 (L) 07/09/2025     07/09/2025    K 3.9 07/09/2025    CO2 29 07/09/2025     07/09/2025    BUN 12 07/09/2025    CREATININE 0.61 07/09/2025     Lab Results   Component Value Date    INR 1.1 07/07/2025    PROTIME 12.5 (H) 07/07/2025     ASSESSMENT/PLAN  Patient is a 65-year-old female with 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    EGD 7/8/2025 with   Impression  Dilation in the distal esophagus with a medium amount of fluid with abnormal mucosa  Three large grade III varices in the middle third of the esophagus and lower third of the esophagus; there was no stigmata of bleeding but given recent bleeding and size and grade of varices decision was made to band. Placed 5 bands successfully, resulting in complete eradication  Mild and friable portal hypertensive gastropathy in the body of the stomach and antrum; performed cold forceps biopsy  The duodenum appeared normal. Performed random biopsy to rule out celiac disease.    Reports hematemesis   Grade 3 esophageal varices s/p banded x 5 with complete eradication  Portal hypertensive gastropathy  Normocytic anemia    -Repeat EGD in 2 months, due 9/6/2025  -Continue monitoring hemoglobin, transfuse if hemoglobin less than 7  -Continue to monitor for signs of overt GIB  -Continue pantoprazole 40 mg IV/p.o. twice daily  -May advance diet as tolerated, low-salt, soft diet  -Continue nonselective beta-blocker  -Follow-up with GI in 2 weeks    I spent 25 minutes in the professional and overall care of this patient.      Ros Menjivar, APRN-CNP

## 2025-07-10 ENCOUNTER — PATIENT OUTREACH (OUTPATIENT)
Dept: PRIMARY CARE | Facility: CLINIC | Age: 65
End: 2025-07-10
Payer: COMMERCIAL

## 2025-07-10 NOTE — PROGRESS NOTES
Discharge Facility: [facility]  Discharge Diagnosis: Acute upper GI bleed s/p EGD  History of esophageal varices, cirrhosis  Admission Date: 7/7/25  Discharge Date: 7/9/25    PCP Appointment Date: 7/14/25  Specialist Appointment Date: Rheumatology Dr Tsai 8/18/25  Hospital Encounter and Summary Linked: Yes  ED to Hosp-Admission (Discharged) with Anthony Duggan MD; Raya Villa MD (07/07/2025)     See discharge assessment below for further details     Wrap Up  Wrap Up Additional Comments: This CM spoke with patient via phone. Successful transition of care outreach complete. Pt reports doing well at home since discharge. New meds reviewed. Pt denies any prescription medication questions. Pt reports it is still uncomfortable to swallow and drink fluids. The pt is able to take her medications. Patient denies any further discharge questions/needs at this time. Emphasized that Follow up is needed after discharge to review the hospital recommendations, assess your response to your treatment. The pt has a follow up appointment with her PCP scheduled and plans on attending that appointment. Pt aware of my availability for non-emergent concerns. Contact info provided to patient. (7/10/2025  9:49 AM)    Medications  Medications reviewed with patient/caregiver?: Yes (7/10/2025  9:49 AM)  Is the patient having any side effects they believe may be caused by any medication additions or changes?: No (7/10/2025  9:49 AM)  Does the patient have all medications ordered at discharge?: Yes (7/10/2025  9:49 AM)  Care Management Interventions: No intervention needed (7/10/2025  9:49 AM)  Prescription Comments: pantoprazole 40 mg EC tablet; Commonly known as: ProtoNix; Take 1 tablet   (40 mg) by mouth 2 times a day. Do not crush, chew, or split. (7/10/2025  9:49 AM)  Is the patient taking all medications as directed (includes completed medication regime)?: Yes (7/10/2025  9:49 AM)  Care Management Interventions: Provided patient  education (7/10/2025  9:49 AM)  Medication Comments: no noted issues obtaining medications (7/10/2025  9:49 AM)    Appointments  Does the patient have a primary care provider?: Yes (7/10/2025  9:49 AM)  Care Management Interventions: Verified appointment date/time/provider (7/10/2025  9:49 AM)  Has the patient kept scheduled appointments due by today?: Yes (7/10/2025  9:49 AM)  Care Management Interventions: Advised patient to keep appointment (7/10/2025  9:49 AM)    Self Management  What is the home health agency?: denies need (7/10/2025  9:49 AM)  What Durable Medical Equipment (DME) was ordered?: denies need (7/10/2025  9:49 AM)    Patient Teaching  Does the patient have access to their discharge instructions?: Yes (7/10/2025  9:49 AM)  Care Management Interventions: Reviewed instructions with patient (7/10/2025  9:49 AM)  What is the patient's perception of their health status since discharge?: Improving (7/10/2025  9:49 AM)  Is the patient/caregiver able to teach back the hierarchy of who to call/visit for symptoms/problems? PCP, Specialist, Home Health nurse, Urgent Care, ED, 911: Yes (7/10/2025  9:49 AM)

## 2025-07-14 ENCOUNTER — APPOINTMENT (OUTPATIENT)
Dept: PRIMARY CARE | Facility: CLINIC | Age: 65
End: 2025-07-14
Payer: COMMERCIAL

## 2025-07-14 VITALS
OXYGEN SATURATION: 97 % | HEART RATE: 63 BPM | WEIGHT: 174 LBS | DIASTOLIC BLOOD PRESSURE: 76 MMHG | HEIGHT: 61 IN | SYSTOLIC BLOOD PRESSURE: 124 MMHG | RESPIRATION RATE: 18 BRPM | BODY MASS INDEX: 32.85 KG/M2 | TEMPERATURE: 97.7 F

## 2025-07-14 DIAGNOSIS — K92.2 ACUTE UPPER GI BLEED: Primary | ICD-10-CM

## 2025-07-14 DIAGNOSIS — I85.11 SECONDARY ESOPHAGEAL VARICES WITH BLEEDING (MULTI): ICD-10-CM

## 2025-07-14 DIAGNOSIS — K74.60 CIRRHOSIS OF LIVER WITHOUT ASCITES, UNSPECIFIED HEPATIC CIRRHOSIS TYPE (MULTI): ICD-10-CM

## 2025-07-14 PROCEDURE — 99496 TRANSJ CARE MGMT HIGH F2F 7D: CPT | Performed by: PHYSICIAN ASSISTANT

## 2025-07-14 PROCEDURE — 1111F DSCHRG MED/CURRENT MED MERGE: CPT | Performed by: PHYSICIAN ASSISTANT

## 2025-07-14 PROCEDURE — 4010F ACE/ARB THERAPY RXD/TAKEN: CPT | Performed by: PHYSICIAN ASSISTANT

## 2025-07-14 PROCEDURE — 3074F SYST BP LT 130 MM HG: CPT | Performed by: PHYSICIAN ASSISTANT

## 2025-07-14 PROCEDURE — 3008F BODY MASS INDEX DOCD: CPT | Performed by: PHYSICIAN ASSISTANT

## 2025-07-14 PROCEDURE — 3078F DIAST BP <80 MM HG: CPT | Performed by: PHYSICIAN ASSISTANT

## 2025-07-14 PROCEDURE — 3044F HG A1C LEVEL LT 7.0%: CPT | Performed by: PHYSICIAN ASSISTANT

## 2025-07-14 PROCEDURE — 1159F MED LIST DOCD IN RCRD: CPT | Performed by: PHYSICIAN ASSISTANT

## 2025-07-14 NOTE — PROGRESS NOTES
"Chief Complaint   Patient presents with    Hospital Follow-up     Pt here today for a hospital follow up    Discharge Facility: [facility]  Discharge Diagnosis: Acute upper GI bleed s/p EGD  History of esophageal varices, cirrhosis  Admission Date: 25  Discharge Date: 25    Since being out its been hard to swallow food/drinks.       Forms/questionnaires     Pt has Lupus and Fibromyalgia already has FMLA through Dr Tsai and now she needs more filled out through you for the stuff that just happened in the hospital.     Sinus Problem     Pt gets a runny/stuffy nose every morning with sinus drainage; she uses Flonase but wants to know what med she can take         Patient: Daylin Tyler  : 1960  PCP: Regina Romo PA-C  MRN: [id number]  Program: Transitional Care Management  Status: Enrolled  Effective Dates: 7/10/2025 - present  Responsible Staff: Roxanne Coronel RN  Social Drivers to be Addressed: No information to display      Hospital follow up  - for 2 weeks, it hurt when she tried to eat and she was feeling fatigued and having headaches   - Took ibuprofen 800 mg and Excedrin   - Then at night tried to lay down and then upset stomach and vomited blood and continued to do so   - Has appt with her GI specialist in September   - Now taking pantoprazole twice a day   - Now staying away from Excedrin, ibuprofen      Daylin Tyler is a 65 y.o. female presenting today for follow-up after being discharged from the hospital 5 days ago. The main problem requiring admission was acute upper GI bleed. The discharge summary and/or Transitional Care Management documentation was reviewed. Medication reconciliation was performed as indicated via the \"Mukund as Reviewed\" timestamp.     Daylin Tyler was contacted by Transitional Care Management services two days after her discharge. This encounter and supporting documentation was reviewed.    Review of Systems    /76   Pulse 63   Temp 36.5 °C (97.7 °F)   Resp 18   Ht " "(!) 1.549 m (5' 1\")   Wt 78.9 kg (174 lb)   SpO2 97%   BMI 32.88 kg/m²     Physical Exam  Constitutional:       Appearance: Normal appearance.     Cardiovascular:      Rate and Rhythm: Normal rate and regular rhythm.      Pulses: Normal pulses.      Heart sounds: Normal heart sounds. No murmur heard.  Pulmonary:      Effort: Pulmonary effort is normal.      Breath sounds: Normal breath sounds.     Musculoskeletal:      Right lower leg: No edema.      Left lower leg: No edema.     Neurological:      Mental Status: She is alert.     Psychiatric:         Mood and Affect: Mood and affect normal.         The complexity of medical decision making for this patient's transitional care is high.    Assessment/Plan   Problem List Items Addressed This Visit       Acute upper GI bleed - Primary    Cirrhosis of liver without ascites (Multi)    Secondary esophageal varices with bleeding (Multi)      Following with GI, Dr. Man for the compensated cirrhosis   Long discussion today educating pt about her dx and answering her questions   Encouraged she avoid all nephrotoxins e.g. Tylenol (Excedrin)   Also encouraged avoid all NSAIDs and alcohol       "

## 2025-07-15 ENCOUNTER — TELEPHONE (OUTPATIENT)
Dept: GASTROENTEROLOGY | Facility: HOSPITAL | Age: 65
End: 2025-07-15
Payer: COMMERCIAL

## 2025-07-15 DIAGNOSIS — I85.00 ESOPHAGEAL VARICES WITHOUT BLEEDING, UNSPECIFIED ESOPHAGEAL VARICES TYPE (MULTI): Primary | ICD-10-CM

## 2025-07-17 PROCEDURE — RXMED WILLOW AMBULATORY MEDICATION CHARGE

## 2025-07-21 ENCOUNTER — PHARMACY VISIT (OUTPATIENT)
Dept: PHARMACY | Facility: CLINIC | Age: 65
End: 2025-07-21
Payer: COMMERCIAL

## 2025-07-23 ENCOUNTER — HOSPITAL ENCOUNTER (INPATIENT)
Facility: HOSPITAL | Age: 65
DRG: 445 | End: 2025-07-23
Attending: EMERGENCY MEDICINE | Admitting: HOSPITALIST
Payer: COMMERCIAL

## 2025-07-23 ENCOUNTER — PATIENT OUTREACH (OUTPATIENT)
Dept: PRIMARY CARE | Facility: CLINIC | Age: 65
End: 2025-07-23
Payer: COMMERCIAL

## 2025-07-23 ENCOUNTER — TELEPHONE (OUTPATIENT)
Dept: PRIMARY CARE | Facility: CLINIC | Age: 65
End: 2025-07-23

## 2025-07-23 ENCOUNTER — APPOINTMENT (OUTPATIENT)
Dept: RADIOLOGY | Facility: HOSPITAL | Age: 65
DRG: 445 | End: 2025-07-23
Payer: COMMERCIAL

## 2025-07-23 DIAGNOSIS — E27.9 LESION OF ADRENAL GLAND (MULTI): ICD-10-CM

## 2025-07-23 DIAGNOSIS — R10.13 EPIGASTRIC ABDOMINAL PAIN: Primary | ICD-10-CM

## 2025-07-23 DIAGNOSIS — R79.89 ELEVATED LFTS: ICD-10-CM

## 2025-07-23 DIAGNOSIS — K74.60 CIRRHOSIS OF LIVER WITHOUT ASCITES, UNSPECIFIED HEPATIC CIRRHOSIS TYPE (MULTI): ICD-10-CM

## 2025-07-23 DIAGNOSIS — R74.01 ELEVATED TRANSAMINASE LEVEL: ICD-10-CM

## 2025-07-23 DIAGNOSIS — K80.50 CHOLEDOCHOLITHIASIS: ICD-10-CM

## 2025-07-23 DIAGNOSIS — R10.11 ABDOMINAL PAIN, ACUTE, RIGHT UPPER QUADRANT: ICD-10-CM

## 2025-07-23 DIAGNOSIS — Z87.19 HISTORY OF ESOPHAGEAL VARICES WITH BLEEDING: ICD-10-CM

## 2025-07-23 PROBLEM — K92.2 GI BLEED: Status: ACTIVE | Noted: 2025-07-23

## 2025-07-23 LAB
ABO GROUP (TYPE) IN BLOOD: NORMAL
ALBUMIN SERPL BCP-MCNC: 3.3 G/DL (ref 3.4–5)
ALP SERPL-CCNC: 193 U/L (ref 33–136)
ALT SERPL W P-5'-P-CCNC: 50 U/L (ref 7–45)
ANION GAP SERPL CALC-SCNC: 11 MMOL/L (ref 10–20)
ANTIBODY SCREEN: NORMAL
AST SERPL W P-5'-P-CCNC: 108 U/L (ref 9–39)
BASOPHILS # BLD AUTO: 0.02 X10*3/UL (ref 0–0.1)
BASOPHILS # BLD AUTO: 0.02 X10*3/UL (ref 0–0.1)
BASOPHILS NFR BLD AUTO: 0.4 %
BASOPHILS NFR BLD AUTO: 0.5 %
BILIRUB DIRECT SERPL-MCNC: 1.2 MG/DL (ref 0–0.3)
BILIRUB SERPL-MCNC: 2 MG/DL (ref 0–1.2)
BUN SERPL-MCNC: 10 MG/DL (ref 6–23)
CALCIUM SERPL-MCNC: 8.6 MG/DL (ref 8.6–10.3)
CARDIAC TROPONIN I PNL SERPL HS: 4 NG/L (ref 0–13)
CHLORIDE SERPL-SCNC: 107 MMOL/L (ref 98–107)
CO2 SERPL-SCNC: 25 MMOL/L (ref 21–32)
CREAT SERPL-MCNC: 0.55 MG/DL (ref 0.5–1.05)
EGFRCR SERPLBLD CKD-EPI 2021: >90 ML/MIN/1.73M*2
EOSINOPHIL # BLD AUTO: 0.14 X10*3/UL (ref 0–0.7)
EOSINOPHIL # BLD AUTO: 0.16 X10*3/UL (ref 0–0.7)
EOSINOPHIL NFR BLD AUTO: 3.2 %
EOSINOPHIL NFR BLD AUTO: 3.4 %
ERYTHROCYTE [DISTWIDTH] IN BLOOD BY AUTOMATED COUNT: 14 % (ref 11.5–14.5)
ERYTHROCYTE [DISTWIDTH] IN BLOOD BY AUTOMATED COUNT: 14 % (ref 11.5–14.5)
GLUCOSE SERPL-MCNC: 129 MG/DL (ref 74–99)
HCT VFR BLD AUTO: 26.2 % (ref 36–46)
HCT VFR BLD AUTO: 27.4 % (ref 36–46)
HGB BLD-MCNC: 8.5 G/DL (ref 12–16)
HGB BLD-MCNC: 8.8 G/DL (ref 12–16)
IMM GRANULOCYTES # BLD AUTO: 0.02 X10*3/UL (ref 0–0.7)
IMM GRANULOCYTES # BLD AUTO: 0.02 X10*3/UL (ref 0–0.7)
IMM GRANULOCYTES NFR BLD AUTO: 0.4 % (ref 0–0.9)
IMM GRANULOCYTES NFR BLD AUTO: 0.5 % (ref 0–0.9)
INR PPP: 1.3 (ref 0.9–1.1)
LACTATE SERPL-SCNC: 1.1 MMOL/L (ref 0.4–2)
LIPASE SERPL-CCNC: 19 U/L (ref 9–82)
LYMPHOCYTES # BLD AUTO: 0.46 X10*3/UL (ref 1.2–4.8)
LYMPHOCYTES # BLD AUTO: 0.48 X10*3/UL (ref 1.2–4.8)
LYMPHOCYTES NFR BLD AUTO: 10.1 %
LYMPHOCYTES NFR BLD AUTO: 10.6 %
MAGNESIUM SERPL-MCNC: 1.59 MG/DL (ref 1.6–2.4)
MCH RBC QN AUTO: 29.7 PG (ref 26–34)
MCH RBC QN AUTO: 29.9 PG (ref 26–34)
MCHC RBC AUTO-ENTMCNC: 32.1 G/DL (ref 32–36)
MCHC RBC AUTO-ENTMCNC: 32.4 G/DL (ref 32–36)
MCV RBC AUTO: 92 FL (ref 80–100)
MCV RBC AUTO: 93 FL (ref 80–100)
MONOCYTES # BLD AUTO: 0.36 X10*3/UL (ref 0.1–1)
MONOCYTES # BLD AUTO: 0.38 X10*3/UL (ref 0.1–1)
MONOCYTES NFR BLD AUTO: 8 %
MONOCYTES NFR BLD AUTO: 8.3 %
NEUTROPHILS # BLD AUTO: 3.36 X10*3/UL (ref 1.2–7.7)
NEUTROPHILS # BLD AUTO: 3.69 X10*3/UL (ref 1.2–7.7)
NEUTROPHILS NFR BLD AUTO: 76.9 %
NEUTROPHILS NFR BLD AUTO: 77.7 %
NRBC BLD-RTO: 0 /100 WBCS (ref 0–0)
NRBC BLD-RTO: 0 /100 WBCS (ref 0–0)
PLATELET # BLD AUTO: 75 X10*3/UL (ref 150–450)
PLATELET # BLD AUTO: 77 X10*3/UL (ref 150–450)
POTASSIUM SERPL-SCNC: 3.9 MMOL/L (ref 3.5–5.3)
PROT SERPL-MCNC: 5.7 G/DL (ref 6.4–8.2)
PROTHROMBIN TIME: 14.1 SECONDS (ref 9.8–12.4)
RBC # BLD AUTO: 2.84 X10*6/UL (ref 4–5.2)
RBC # BLD AUTO: 2.96 X10*6/UL (ref 4–5.2)
RH FACTOR (ANTIGEN D): NORMAL
SODIUM SERPL-SCNC: 139 MMOL/L (ref 136–145)
WBC # BLD AUTO: 4.4 X10*3/UL (ref 4.4–11.3)
WBC # BLD AUTO: 4.8 X10*3/UL (ref 4.4–11.3)

## 2025-07-23 PROCEDURE — 96361 HYDRATE IV INFUSION ADD-ON: CPT

## 2025-07-23 PROCEDURE — 2500000004 HC RX 250 GENERAL PHARMACY W/ HCPCS (ALT 636 FOR OP/ED)

## 2025-07-23 PROCEDURE — 85610 PROTHROMBIN TIME: CPT

## 2025-07-23 PROCEDURE — 86901 BLOOD TYPING SEROLOGIC RH(D): CPT

## 2025-07-23 PROCEDURE — 99223 1ST HOSP IP/OBS HIGH 75: CPT | Performed by: NURSE PRACTITIONER

## 2025-07-23 PROCEDURE — 80048 BASIC METABOLIC PNL TOTAL CA: CPT

## 2025-07-23 PROCEDURE — 96376 TX/PRO/DX INJ SAME DRUG ADON: CPT

## 2025-07-23 PROCEDURE — 85025 COMPLETE CBC W/AUTO DIFF WBC: CPT

## 2025-07-23 PROCEDURE — 96365 THER/PROPH/DIAG IV INF INIT: CPT

## 2025-07-23 PROCEDURE — 83735 ASSAY OF MAGNESIUM: CPT

## 2025-07-23 PROCEDURE — 84484 ASSAY OF TROPONIN QUANT: CPT

## 2025-07-23 PROCEDURE — 82248 BILIRUBIN DIRECT: CPT

## 2025-07-23 PROCEDURE — G0378 HOSPITAL OBSERVATION PER HR: HCPCS

## 2025-07-23 PROCEDURE — 2550000001 HC RX 255 CONTRASTS: Performed by: EMERGENCY MEDICINE

## 2025-07-23 PROCEDURE — 36415 COLL VENOUS BLD VENIPUNCTURE: CPT

## 2025-07-23 PROCEDURE — 99285 EMERGENCY DEPT VISIT HI MDM: CPT | Mod: 25 | Performed by: EMERGENCY MEDICINE

## 2025-07-23 PROCEDURE — 83690 ASSAY OF LIPASE: CPT

## 2025-07-23 PROCEDURE — 74174 CTA ABD&PLVS W/CONTRAST: CPT | Mod: FOREIGN READ | Performed by: RADIOLOGY

## 2025-07-23 PROCEDURE — 74174 CTA ABD&PLVS W/CONTRAST: CPT

## 2025-07-23 PROCEDURE — 83605 ASSAY OF LACTIC ACID: CPT

## 2025-07-23 PROCEDURE — 96375 TX/PRO/DX INJ NEW DRUG ADDON: CPT

## 2025-07-23 RX ORDER — MORPHINE SULFATE 4 MG/ML
4 INJECTION, SOLUTION INTRAMUSCULAR; INTRAVENOUS ONCE
Status: COMPLETED | OUTPATIENT
Start: 2025-07-23 | End: 2025-07-23

## 2025-07-23 RX ORDER — ONDANSETRON HYDROCHLORIDE 2 MG/ML
4 INJECTION, SOLUTION INTRAVENOUS ONCE
Status: COMPLETED | OUTPATIENT
Start: 2025-07-23 | End: 2025-07-23

## 2025-07-23 RX ORDER — PANTOPRAZOLE SODIUM 40 MG/10ML
80 INJECTION, POWDER, LYOPHILIZED, FOR SOLUTION INTRAVENOUS DAILY
Status: DISCONTINUED | OUTPATIENT
Start: 2025-07-23 | End: 2025-07-24

## 2025-07-23 RX ORDER — MAGNESIUM SULFATE HEPTAHYDRATE 40 MG/ML
2 INJECTION, SOLUTION INTRAVENOUS ONCE
Status: COMPLETED | OUTPATIENT
Start: 2025-07-23 | End: 2025-07-24

## 2025-07-23 RX ADMIN — MAGNESIUM SULFATE HEPTAHYDRATE 2 G: 40 INJECTION, SOLUTION INTRAVENOUS at 23:47

## 2025-07-23 RX ADMIN — IOHEXOL 75 ML: 350 INJECTION, SOLUTION INTRAVENOUS at 20:05

## 2025-07-23 RX ADMIN — MORPHINE SULFATE 4 MG: 4 INJECTION, SOLUTION INTRAMUSCULAR; INTRAVENOUS at 21:58

## 2025-07-23 RX ADMIN — PANTOPRAZOLE SODIUM 80 MG: 40 INJECTION, POWDER, FOR SOLUTION INTRAVENOUS at 18:47

## 2025-07-23 RX ADMIN — ONDANSETRON 4 MG: 2 INJECTION INTRAMUSCULAR; INTRAVENOUS at 18:48

## 2025-07-23 RX ADMIN — SODIUM CHLORIDE 500 ML: 0.9 INJECTION, SOLUTION INTRAVENOUS at 18:47

## 2025-07-23 RX ADMIN — MORPHINE SULFATE 4 MG: 4 INJECTION, SOLUTION INTRAMUSCULAR; INTRAVENOUS at 18:49

## 2025-07-23 ASSESSMENT — PAIN DESCRIPTION - PAIN TYPE
TYPE: ACUTE PAIN
TYPE: ACUTE PAIN

## 2025-07-23 ASSESSMENT — PAIN DESCRIPTION - LOCATION
LOCATION: ABDOMEN
LOCATION: ABDOMEN

## 2025-07-23 ASSESSMENT — PAIN SCALES - GENERAL
PAINLEVEL_OUTOF10: 8
PAINLEVEL_OUTOF10: 7

## 2025-07-23 ASSESSMENT — PAIN DESCRIPTION - DESCRIPTORS
DESCRIPTORS: CRAMPING
DESCRIPTORS: CRAMPING

## 2025-07-23 ASSESSMENT — PAIN - FUNCTIONAL ASSESSMENT
PAIN_FUNCTIONAL_ASSESSMENT: 0-10
PAIN_FUNCTIONAL_ASSESSMENT: 0-10

## 2025-07-23 ASSESSMENT — PAIN DESCRIPTION - PROGRESSION: CLINICAL_PROGRESSION: NOT CHANGED

## 2025-07-23 NOTE — PROGRESS NOTES
Confirmation of at least 2 patient identifiers.    Completed telephonic follow-up with patient after recent visit with Regina BENITEZ    Spoke to patient during outreach call.    Patient reports feeling: Worse pt states she started to have some cramping since yesterday and is trying OTC medications. Denies any nausea, vomiting, or diarrhea    Patient has questions or concerns about medications: No    Have all prescribed medications been filled? Yes    Patient has necessary resources to manage their care? Yes    Patient has questions or concerns? No    Next care management follow-up approximately within one month.  Care  information provided to patient.

## 2025-07-24 ENCOUNTER — APPOINTMENT (OUTPATIENT)
Dept: RADIOLOGY | Facility: HOSPITAL | Age: 65
DRG: 445 | End: 2025-07-24
Payer: COMMERCIAL

## 2025-07-24 LAB
ALBUMIN SERPL BCP-MCNC: 3.2 G/DL (ref 3.4–5)
ALP SERPL-CCNC: 208 U/L (ref 33–136)
ALT SERPL W P-5'-P-CCNC: 67 U/L (ref 7–45)
ANION GAP SERPL CALC-SCNC: 10 MMOL/L (ref 10–20)
APTT PPP: 33 SECONDS (ref 26–36)
AST SERPL W P-5'-P-CCNC: 139 U/L (ref 9–39)
BASOPHILS # BLD AUTO: 0.03 X10*3/UL (ref 0–0.1)
BASOPHILS NFR BLD AUTO: 0.8 %
BILIRUB SERPL-MCNC: 3 MG/DL (ref 0–1.2)
BUN SERPL-MCNC: 8 MG/DL (ref 6–23)
CALCIUM SERPL-MCNC: 8.6 MG/DL (ref 8.6–10.3)
CHLORIDE SERPL-SCNC: 108 MMOL/L (ref 98–107)
CO2 SERPL-SCNC: 27 MMOL/L (ref 21–32)
CREAT SERPL-MCNC: 0.54 MG/DL (ref 0.5–1.05)
EGFRCR SERPLBLD CKD-EPI 2021: >90 ML/MIN/1.73M*2
EOSINOPHIL # BLD AUTO: 0.21 X10*3/UL (ref 0–0.7)
EOSINOPHIL NFR BLD AUTO: 5.8 %
ERYTHROCYTE [DISTWIDTH] IN BLOOD BY AUTOMATED COUNT: 14.1 % (ref 11.5–14.5)
FIBRINOGEN PPP-MCNC: 263 MG/DL (ref 200–400)
GLUCOSE BLD MANUAL STRIP-MCNC: 102 MG/DL (ref 74–99)
GLUCOSE BLD MANUAL STRIP-MCNC: 107 MG/DL (ref 74–99)
GLUCOSE BLD MANUAL STRIP-MCNC: 156 MG/DL (ref 74–99)
GLUCOSE BLD MANUAL STRIP-MCNC: 81 MG/DL (ref 74–99)
GLUCOSE SERPL-MCNC: 103 MG/DL (ref 74–99)
HCT VFR BLD AUTO: 26 % (ref 36–46)
HGB BLD-MCNC: 8.3 G/DL (ref 12–16)
HOLD SPECIMEN: NORMAL
IMM GRANULOCYTES # BLD AUTO: 0.01 X10*3/UL (ref 0–0.7)
IMM GRANULOCYTES NFR BLD AUTO: 0.3 % (ref 0–0.9)
INR PPP: 1.2 (ref 0.9–1.1)
LYMPHOCYTES # BLD AUTO: 0.54 X10*3/UL (ref 1.2–4.8)
LYMPHOCYTES NFR BLD AUTO: 14.8 %
MAGNESIUM SERPL-MCNC: 2.03 MG/DL (ref 1.6–2.4)
MCH RBC QN AUTO: 29.4 PG (ref 26–34)
MCHC RBC AUTO-ENTMCNC: 31.9 G/DL (ref 32–36)
MCV RBC AUTO: 92 FL (ref 80–100)
MONOCYTES # BLD AUTO: 0.37 X10*3/UL (ref 0.1–1)
MONOCYTES NFR BLD AUTO: 10.1 %
NEUTROPHILS # BLD AUTO: 2.49 X10*3/UL (ref 1.2–7.7)
NEUTROPHILS NFR BLD AUTO: 68.2 %
NRBC BLD-RTO: 0 /100 WBCS (ref 0–0)
PLATELET # BLD AUTO: 67 X10*3/UL (ref 150–450)
POTASSIUM SERPL-SCNC: 3.8 MMOL/L (ref 3.5–5.3)
PROT SERPL-MCNC: 5.4 G/DL (ref 6.4–8.2)
PROTHROMBIN TIME: 12.8 SECONDS (ref 9.8–12.4)
RBC # BLD AUTO: 2.82 X10*6/UL (ref 4–5.2)
SODIUM SERPL-SCNC: 141 MMOL/L (ref 136–145)
WBC # BLD AUTO: 3.7 X10*3/UL (ref 4.4–11.3)

## 2025-07-24 PROCEDURE — 80053 COMPREHEN METABOLIC PANEL: CPT | Performed by: NURSE PRACTITIONER

## 2025-07-24 PROCEDURE — 99222 1ST HOSP IP/OBS MODERATE 55: CPT | Performed by: NURSE PRACTITIONER

## 2025-07-24 PROCEDURE — 2500000004 HC RX 250 GENERAL PHARMACY W/ HCPCS (ALT 636 FOR OP/ED): Performed by: HOSPITALIST

## 2025-07-24 PROCEDURE — 83735 ASSAY OF MAGNESIUM: CPT | Performed by: NURSE PRACTITIONER

## 2025-07-24 PROCEDURE — 36415 COLL VENOUS BLD VENIPUNCTURE: CPT | Performed by: NURSE PRACTITIONER

## 2025-07-24 PROCEDURE — 85025 COMPLETE CBC W/AUTO DIFF WBC: CPT | Performed by: NURSE PRACTITIONER

## 2025-07-24 PROCEDURE — 76705 ECHO EXAM OF ABDOMEN: CPT | Performed by: RADIOLOGY

## 2025-07-24 PROCEDURE — G0378 HOSPITAL OBSERVATION PER HR: HCPCS

## 2025-07-24 PROCEDURE — 82947 ASSAY GLUCOSE BLOOD QUANT: CPT

## 2025-07-24 PROCEDURE — 2500000001 HC RX 250 WO HCPCS SELF ADMINISTERED DRUGS (ALT 637 FOR MEDICARE OP): Performed by: NURSE PRACTITIONER

## 2025-07-24 PROCEDURE — 76705 ECHO EXAM OF ABDOMEN: CPT

## 2025-07-24 PROCEDURE — 85384 FIBRINOGEN ACTIVITY: CPT | Performed by: NURSE PRACTITIONER

## 2025-07-24 PROCEDURE — 99232 SBSQ HOSP IP/OBS MODERATE 35: CPT

## 2025-07-24 PROCEDURE — 85610 PROTHROMBIN TIME: CPT | Performed by: NURSE PRACTITIONER

## 2025-07-24 PROCEDURE — 2500000004 HC RX 250 GENERAL PHARMACY W/ HCPCS (ALT 636 FOR OP/ED): Performed by: NURSE PRACTITIONER

## 2025-07-24 PROCEDURE — 96372 THER/PROPH/DIAG INJ SC/IM: CPT | Performed by: NURSE PRACTITIONER

## 2025-07-24 RX ORDER — ONDANSETRON HYDROCHLORIDE 2 MG/ML
4 INJECTION, SOLUTION INTRAVENOUS EVERY 8 HOURS PRN
Status: DISCONTINUED | OUTPATIENT
Start: 2025-07-24 | End: 2025-07-29 | Stop reason: HOSPADM

## 2025-07-24 RX ORDER — DICYCLOMINE HYDROCHLORIDE 10 MG/ML
10 INJECTION INTRAMUSCULAR ONCE
Status: COMPLETED | OUTPATIENT
Start: 2025-07-24 | End: 2025-07-24

## 2025-07-24 RX ORDER — PREGABALIN 50 MG/1
150 CAPSULE ORAL 3 TIMES DAILY
Status: DISCONTINUED | OUTPATIENT
Start: 2025-07-24 | End: 2025-07-29 | Stop reason: HOSPADM

## 2025-07-24 RX ORDER — PANTOPRAZOLE SODIUM 40 MG/10ML
40 INJECTION, POWDER, LYOPHILIZED, FOR SOLUTION INTRAVENOUS 2 TIMES DAILY
Status: DISCONTINUED | OUTPATIENT
Start: 2025-07-24 | End: 2025-07-29 | Stop reason: HOSPADM

## 2025-07-24 RX ORDER — ACETAMINOPHEN 650 MG/1
650 SUPPOSITORY RECTAL EVERY 4 HOURS PRN
Status: DISCONTINUED | OUTPATIENT
Start: 2025-07-24 | End: 2025-07-29 | Stop reason: HOSPADM

## 2025-07-24 RX ORDER — ACETAMINOPHEN 325 MG/1
650 TABLET ORAL EVERY 4 HOURS PRN
Status: DISCONTINUED | OUTPATIENT
Start: 2025-07-24 | End: 2025-07-29 | Stop reason: HOSPADM

## 2025-07-24 RX ORDER — ONDANSETRON 4 MG/1
4 TABLET, FILM COATED ORAL EVERY 8 HOURS PRN
Status: DISCONTINUED | OUTPATIENT
Start: 2025-07-24 | End: 2025-07-29 | Stop reason: HOSPADM

## 2025-07-24 RX ORDER — HYDROXYCHLOROQUINE SULFATE 200 MG/1
200 TABLET, FILM COATED ORAL DAILY
Status: DISCONTINUED | OUTPATIENT
Start: 2025-07-24 | End: 2025-07-29 | Stop reason: HOSPADM

## 2025-07-24 RX ORDER — FERROUS SULFATE 325(65) MG
65 TABLET ORAL DAILY
Status: DISCONTINUED | OUTPATIENT
Start: 2025-07-24 | End: 2025-07-29 | Stop reason: HOSPADM

## 2025-07-24 RX ORDER — ACETAMINOPHEN 160 MG/5ML
650 SOLUTION ORAL EVERY 4 HOURS PRN
Status: DISCONTINUED | OUTPATIENT
Start: 2025-07-24 | End: 2025-07-29 | Stop reason: HOSPADM

## 2025-07-24 RX ORDER — CARVEDILOL 6.25 MG/1
6.25 TABLET ORAL NIGHTLY
Status: DISCONTINUED | OUTPATIENT
Start: 2025-07-24 | End: 2025-07-29 | Stop reason: HOSPADM

## 2025-07-24 RX ORDER — FOLIC ACID 1 MG/1
1 TABLET ORAL DAILY
Status: DISCONTINUED | OUTPATIENT
Start: 2025-07-24 | End: 2025-07-29 | Stop reason: HOSPADM

## 2025-07-24 RX ADMIN — PANTOPRAZOLE SODIUM 40 MG: 40 INJECTION, POWDER, FOR SOLUTION INTRAVENOUS at 09:47

## 2025-07-24 RX ADMIN — CARVEDILOL 6.25 MG: 6.25 TABLET, FILM COATED ORAL at 20:11

## 2025-07-24 RX ADMIN — DICYCLOMINE HYDROCHLORIDE 10 MG: 10 INJECTION, SOLUTION INTRAMUSCULAR at 01:35

## 2025-07-24 RX ADMIN — PREGABALIN 150 MG: 50 CAPSULE ORAL at 20:11

## 2025-07-24 RX ADMIN — LISINOPRIL 30 MG: 20 TABLET ORAL at 12:00

## 2025-07-24 RX ADMIN — PREGABALIN 150 MG: 50 CAPSULE ORAL at 12:00

## 2025-07-24 RX ADMIN — HYDROXYCHLOROQUINE SULFATE 200 MG: 200 TABLET, FILM COATED ORAL at 12:00

## 2025-07-24 RX ADMIN — PANTOPRAZOLE SODIUM 40 MG: 40 INJECTION, POWDER, FOR SOLUTION INTRAVENOUS at 20:11

## 2025-07-24 RX ADMIN — PREGABALIN 150 MG: 50 CAPSULE ORAL at 15:59

## 2025-07-24 RX ADMIN — FOLIC ACID 1 MG: 1 TABLET ORAL at 12:00

## 2025-07-24 SDOH — ECONOMIC STABILITY: FOOD INSECURITY: HOW HARD IS IT FOR YOU TO PAY FOR THE VERY BASICS LIKE FOOD, HOUSING, MEDICAL CARE, AND HEATING?: NOT HARD AT ALL

## 2025-07-24 SDOH — ECONOMIC STABILITY: HOUSING INSECURITY: IN THE PAST 12 MONTHS, HOW MANY TIMES HAVE YOU MOVED WHERE YOU WERE LIVING?: 0

## 2025-07-24 SDOH — ECONOMIC STABILITY: INCOME INSECURITY: IN THE PAST 12 MONTHS HAS THE ELECTRIC, GAS, OIL, OR WATER COMPANY THREATENED TO SHUT OFF SERVICES IN YOUR HOME?: NO

## 2025-07-24 SDOH — ECONOMIC STABILITY: HOUSING INSECURITY: IN THE LAST 12 MONTHS, WAS THERE A TIME WHEN YOU WERE NOT ABLE TO PAY THE MORTGAGE OR RENT ON TIME?: NO

## 2025-07-24 SDOH — ECONOMIC STABILITY: FOOD INSECURITY: WITHIN THE PAST 12 MONTHS, YOU WORRIED THAT YOUR FOOD WOULD RUN OUT BEFORE YOU GOT THE MONEY TO BUY MORE.: NEVER TRUE

## 2025-07-24 SDOH — SOCIAL STABILITY: SOCIAL INSECURITY: WITHIN THE LAST YEAR, HAVE YOU BEEN AFRAID OF YOUR PARTNER OR EX-PARTNER?: NO

## 2025-07-24 SDOH — ECONOMIC STABILITY: FOOD INSECURITY: WITHIN THE PAST 12 MONTHS, THE FOOD YOU BOUGHT JUST DIDN'T LAST AND YOU DIDN'T HAVE MONEY TO GET MORE.: NEVER TRUE

## 2025-07-24 SDOH — SOCIAL STABILITY: SOCIAL INSECURITY: WITHIN THE LAST YEAR, HAVE YOU BEEN HUMILIATED OR EMOTIONALLY ABUSED IN OTHER WAYS BY YOUR PARTNER OR EX-PARTNER?: NO

## 2025-07-24 SDOH — SOCIAL STABILITY: SOCIAL INSECURITY: HAVE YOU HAD THOUGHTS OF HARMING ANYONE ELSE?: NO

## 2025-07-24 SDOH — SOCIAL STABILITY: SOCIAL INSECURITY: ARE YOU OR HAVE YOU BEEN THREATENED OR ABUSED PHYSICALLY, EMOTIONALLY, OR SEXUALLY BY ANYONE?: NO

## 2025-07-24 SDOH — SOCIAL STABILITY: SOCIAL INSECURITY: ARE THERE ANY APPARENT SIGNS OF INJURIES/BEHAVIORS THAT COULD BE RELATED TO ABUSE/NEGLECT?: NO

## 2025-07-24 SDOH — ECONOMIC STABILITY: HOUSING INSECURITY: AT ANY TIME IN THE PAST 12 MONTHS, WERE YOU HOMELESS OR LIVING IN A SHELTER (INCLUDING NOW)?: NO

## 2025-07-24 SDOH — SOCIAL STABILITY: SOCIAL INSECURITY: DOES ANYONE TRY TO KEEP YOU FROM HAVING/CONTACTING OTHER FRIENDS OR DOING THINGS OUTSIDE YOUR HOME?: NO

## 2025-07-24 SDOH — SOCIAL STABILITY: SOCIAL INSECURITY: HAVE YOU HAD ANY THOUGHTS OF HARMING ANYONE ELSE?: NO

## 2025-07-24 SDOH — SOCIAL STABILITY: SOCIAL INSECURITY: HAS ANYONE EVER THREATENED TO HURT YOUR FAMILY OR YOUR PETS?: NO

## 2025-07-24 SDOH — SOCIAL STABILITY: SOCIAL INSECURITY: ABUSE: ADULT

## 2025-07-24 SDOH — SOCIAL STABILITY: SOCIAL INSECURITY: DO YOU FEEL ANYONE HAS EXPLOITED OR TAKEN ADVANTAGE OF YOU FINANCIALLY OR OF YOUR PERSONAL PROPERTY?: NO

## 2025-07-24 SDOH — SOCIAL STABILITY: SOCIAL INSECURITY: WERE YOU ABLE TO COMPLETE ALL THE BEHAVIORAL HEALTH SCREENINGS?: YES

## 2025-07-24 SDOH — ECONOMIC STABILITY: TRANSPORTATION INSECURITY: IN THE PAST 12 MONTHS, HAS LACK OF TRANSPORTATION KEPT YOU FROM MEDICAL APPOINTMENTS OR FROM GETTING MEDICATIONS?: NO

## 2025-07-24 SDOH — SOCIAL STABILITY: SOCIAL INSECURITY: DO YOU FEEL UNSAFE GOING BACK TO THE PLACE WHERE YOU ARE LIVING?: NO

## 2025-07-24 ASSESSMENT — LIFESTYLE VARIABLES
HOW OFTEN DO YOU HAVE 6 OR MORE DRINKS ON ONE OCCASION: NEVER
HOW MANY STANDARD DRINKS CONTAINING ALCOHOL DO YOU HAVE ON A TYPICAL DAY: 1 OR 2
SKIP TO QUESTIONS 9-10: 1
HOW OFTEN DO YOU HAVE A DRINK CONTAINING ALCOHOL: MONTHLY OR LESS
AUDIT-C TOTAL SCORE: 1
AUDIT-C TOTAL SCORE: 1

## 2025-07-24 ASSESSMENT — COGNITIVE AND FUNCTIONAL STATUS - GENERAL
DAILY ACTIVITIY SCORE: 24
MOBILITY SCORE: 24
MOBILITY SCORE: 24
PATIENT BASELINE BEDBOUND: NO
DAILY ACTIVITIY SCORE: 24

## 2025-07-24 ASSESSMENT — ACTIVITIES OF DAILY LIVING (ADL)
HEARING - RIGHT EAR: FUNCTIONAL
PATIENT'S MEMORY ADEQUATE TO SAFELY COMPLETE DAILY ACTIVITIES?: YES
HEARING - LEFT EAR: FUNCTIONAL
DRESSING YOURSELF: INDEPENDENT
BATHING: INDEPENDENT
TOILETING: INDEPENDENT
ADEQUATE_TO_COMPLETE_ADL: YES
FEEDING YOURSELF: INDEPENDENT
LACK_OF_TRANSPORTATION: NO
LACK_OF_TRANSPORTATION: NO
GROOMING: INDEPENDENT
JUDGMENT_ADEQUATE_SAFELY_COMPLETE_DAILY_ACTIVITIES: YES
WALKS IN HOME: INDEPENDENT

## 2025-07-24 ASSESSMENT — PAIN - FUNCTIONAL ASSESSMENT
PAIN_FUNCTIONAL_ASSESSMENT: 0-10
PAIN_FUNCTIONAL_ASSESSMENT: 0-10

## 2025-07-24 ASSESSMENT — PAIN SCALES - GENERAL
PAINLEVEL_OUTOF10: 0 - NO PAIN
PAINLEVEL_OUTOF10: 0 - NO PAIN
PAINLEVEL_OUTOF10: 7
PAINLEVEL_OUTOF10: 0 - NO PAIN

## 2025-07-24 ASSESSMENT — PAIN DESCRIPTION - DESCRIPTORS: DESCRIPTORS: CRAMPING

## 2025-07-24 NOTE — CARE PLAN
The patient's goals for the shift include      The clinical goals for the shift include Patient will remain hemodynamically stable throughout the shift

## 2025-07-24 NOTE — ED PROVIDER NOTES
HPI   Chief Complaint   Patient presents with    Abdominal Cramping     Abdominal cramping for a very long time.        65-year-old female with past medical history significant for type 2 diabetes mellitus, GERD, HTN, lupus, and cirrhosis presents to the emergency department for evaluation of abdominal pain and cramping x 2 days.  Patient tells me that her pain is epigastric, does occasionally radiate to her back, describes as stabbing and constant, better when she leans forward, if nothing specific making it worse.  She also tells me that 2 days ago she had 1 episode of bloody stools following the use of a laxative as well as a dark stool later that night.  However does tell me she takes iron pills.  States since then she has had normal stools with last bowel movement being earlier today.  Patient tells me she was here a few weeks ago with the same pain, at which time she was told she had a variceal bleed, and did have to get a EGD.  States she was doing fine until 2 days ago.  States that the pain is similar, however reports that the last time she was vomiting blood clots which she has not had any of that during this instance.  Denies chest pain, shortness of breath, fever, chills, body aches, vomiting, or diarrhea.  Denies current alcohol, tobacco or illicit drug use.      History provided by:  Patient   used: No      Patient History   Medical History[1]  Surgical History[2]  Family History[3]  Social History[4]    Physical Exam   ED Triage Vitals [07/23/25 1753]   Temperature Heart Rate Respirations BP   36.7 °C (98.1 °F) 70 18 151/64      Pulse Ox Temp Source Heart Rate Source Patient Position   99 % Temporal Monitor Sitting      BP Location FiO2 (%)     Right arm --       Physical Exam  Nursing notes reviewed and confirmed by me.  Chart review performed including medications, allergies, and medical, surgical, and family history    Constitutional: Vital signs per nursing notes.  Well  developed, well nourished.  No acute distress.  Obese.  Psychiatric: no abnormalities of mood or affect   Eyes: PERRL; conjunctivae and lids normal; EOMI  HENT: Head is normocephalic, atraumatic. External ears normal in appearance without drainage.  Nose is without deformity or drainage.  Posterior oropharynx without edema or erythema.  Moist mucous membranes.  Neck: neck supple, no meningismus signs or rigidity.  trachea midline without deviation.   Respiratory: Breath sounds clear bilaterally no wheezes rales or rhonchi.  No respiratory distress.  Normal respiratory rate/effort.    Cardiovascular: regular rate and rhythm; no murmurs.   distal pulses intact b/l radial  Neurological: normal speech patient is alert and oriented x3.  Patient able to move extremities.  Grossly intact strength and sensation of upper and lower extremities.  No focal neurologic deficits appreciated on exam.  GI: Abdomen is soft with epigastric tenderness to palpation.  No rebound, rigidity, or guarding.  No masses or hernias appreciated.  No CVA tenderness.  Lymphatic: no significant lymphadenopathy appreciated  Musculoskeletal: Patient able to move all extremities.  No deformities or swelling appreciated.  No calf tenderness or edema.  Skin:  no rash or erythema.  No wounds. Normal capillary refill.    ED Course & MDM   Diagnoses as of 07/23/25 2349   Epigastric abdominal pain   Cirrhosis of liver without ascites, unspecified hepatic cirrhosis type (Multi)   Lesion of adrenal gland (Multi)   History of esophageal varices with bleeding   Elevated transaminase level     Delores Coma Scale Score: 15 (07/23/25 1753 : Chiara Barnes RN)                   Medical Decision Making  Differential diagnoses considered but not limited to: GI bleed, esophageal varices, cirrhosis, obstruction    This is a 65-year-old female with past medical history significant for type 2 diabetes mellitus, GERD, HTN, lupus, and cirrhosis presents to the emergency  department for evaluation of abdominal pain and cramping x 2 days.  On presentation hypertensive /64, afebrile, HR 70, RR 18, SpO2 99%.  Patient is nontoxic appearing, in no acute distress. Heart sounds normal with regular rate and rhythm.  Lungs clear to auscultation bilaterally with no adventitious sounds.  Abdomen is soft with epigastric tenderness to palpation, with no rebound or guarding.  No CVA tenderness.  Neurovascularly intact, Brisk capillary refill. Strong and equal pulses throughout. Sensation intact.  Patient initially treated with 80 mg IV Protonix 4 mg IV Zofran, 4 mg IV morphine, and IV fluids.  Patient later was experiencing return of her pain, therefore was given another 4 mg IV morphine.    CBC with no leukocytosis, there is worsening anemia when compared with 2 weeks ago.  Previously RBCs 3.13, today 2.84.  Hemoglobin 9.4 today 8.5, hematocrit 29, today 26.2.  I did repeat CBC and values remained stable.  Did obtain type and screen given the patient's complaints and worsening anemia.  BMP revealed hyperglycemia 129, normal electrolytes and kidney function.  Lipase 19.  High-sensitivity troponin 4 with no evidence of acute ischemia on EKG given low suspicion for ACS.  Magnesium 1.59 which was replenished with 2 g IV magnesium sulfate while in the emergency department..  Hepatic function panel revealed albumin 3.3, total bilirubin 2.0, direct bilirubin 1.2, alkaline phosphatase 1.93, ALT 50, , total protein 5.7 which does show worsening liver function when compared with 2 weeks ago.  CT angio abdomen and pelvis was negative for arterial source of upper or lower GI hemorrhage.  Showed short segment stenosis with angulation and poststenotic dilation centered at the celiac artery ostium.  There was moderate cirrhotic changes with evidence of portal venous hypertension.  Splenomegaly.  1.4 cm isoattenuating lesion in the main body of the right adrenal.  As well as extensive sigmoid and  colonic diverticulosis without diverticulitis.  With the patient's past medical history, worsened anemia, and elevated liver enzymes as well as possible GI bleed reached out to the hospitalist, who is agreeable for patient to be admitted for observation and further evaluation by GI.  Did update patient on lab and imaging findings, as well as my discussion with the hospitalist, and she was agreeable with the plan of care and being admitted to the hospital.  All questions concerns were addressed and answered while in the emergency department prior to admission.    Amount and/or Complexity of Data Reviewed  Labs: ordered. Decision-making details documented in ED Course.  Radiology: ordered. Decision-making details documented in ED Course.  ECG/medicine tests: ordered.     Details: EKG at 1854, with ventricular rate of 61, as interpreted by me, shows a normal rate and rhythm, normal axis, PA interval 170, QRS 84, QT/QTc 426/428. And normal ST and T wave pattern with no evidence of acute ischemia or other acute findings      Labs Reviewed   CBC WITH AUTO DIFFERENTIAL - Abnormal       Result Value    WBC 4.8      nRBC 0.0      RBC 2.84 (*)     Hemoglobin 8.5 (*)     Hematocrit 26.2 (*)     MCV 92      MCH 29.9      MCHC 32.4      RDW 14.0      Platelets 75 (*)     Neutrophils % 77.7      Immature Granulocytes %, Automated 0.4      Lymphocytes % 10.1      Monocytes % 8.0      Eosinophils % 3.4      Basophils % 0.4      Neutrophils Absolute 3.69      Immature Granulocytes Absolute, Automated 0.02      Lymphocytes Absolute 0.48 (*)     Monocytes Absolute 0.38      Eosinophils Absolute 0.16      Basophils Absolute 0.02     MAGNESIUM - Abnormal    Magnesium 1.59 (*)    BASIC METABOLIC PANEL - Abnormal    Glucose 129 (*)     Sodium 139      Potassium 3.9      Chloride 107      Bicarbonate 25      Anion Gap 11      Urea Nitrogen 10      Creatinine 0.55      eGFR >90      Calcium 8.6     PROTIME-INR - Abnormal    Protime 14.1 (*)      INR 1.3 (*)    HEPATIC FUNCTION PANEL - Abnormal    Albumin 3.3 (*)     Bilirubin, Total 2.0 (*)     Bilirubin, Direct 1.2 (*)     Alkaline Phosphatase 193 (*)     ALT 50 (*)      (*)     Total Protein 5.7 (*)    CBC WITH AUTO DIFFERENTIAL - Abnormal    WBC 4.4      nRBC 0.0      RBC 2.96 (*)     Hemoglobin 8.8 (*)     Hematocrit 27.4 (*)     MCV 93      MCH 29.7      MCHC 32.1      RDW 14.0      Platelets 77 (*)     Neutrophils % 76.9      Immature Granulocytes %, Automated 0.5      Lymphocytes % 10.6      Monocytes % 8.3      Eosinophils % 3.2      Basophils % 0.5      Neutrophils Absolute 3.36      Immature Granulocytes Absolute, Automated 0.02      Lymphocytes Absolute 0.46 (*)     Monocytes Absolute 0.36      Eosinophils Absolute 0.14      Basophils Absolute 0.02     LIPASE - Normal    Lipase 19      Narrative:     Venipuncture immediately after or during the administration of Metamizole may lead to falsely low results. Testing should be performed immediately prior to Metamizole dosing.   LACTATE - Normal    Lactate 1.1      Narrative:     Venipuncture immediately after or during the administration of Metamizole may lead to falsely low results. Testing should be performed immediately prior to Metamizole dosing.   TROPONIN I, HIGH SENSITIVITY - Normal    Troponin I, High Sensitivity 4      Narrative:     Less than 99th percentile of normal range cutoff-  Female and children under 18 years old <14 ng/L; Male <21 ng/L: Negative  Repeat testing should be performed if clinically indicated.     Female and children under 18 years old 14-50 ng/L; Male 21-50 ng/L:  Consistent with possible cardiac damage and possible increased clinical   risk. Serial measurements may help to assess extent of myocardial damage.     >50 ng/L: Consistent with cardiac damage, increased clinical risk and  myocardial infarction. Serial measurements may help assess extent of   myocardial damage.      NOTE: Children less than 1 year  old may have higher baseline troponin   levels and results should be interpreted in conjunction with the overall   clinical context.     NOTE: Troponin I testing is performed using a different   testing methodology at [facility] than at other   [facility] hospitals. Direct result comparisons should only   be made within the same method.   TYPE AND SCREEN    ABO TYPE O      Rh TYPE POS      ANTIBODY SCREEN NEG          CT angio abdomen pelvis w and or wo IV IV contrast   Final Result   Vascular:   *No evidence of upper or lower gastrointestinal hemorrhage from   an arterial source.   *Short segment stenosis with angulation and poststenotic   dilatation centered at the celiac artery ostium.  Findings raise the   possibility for median arcuate ligament syndrome in the appropriate   clinical setting.   Abdomen/pelvis:   *Moderate cirrhotic changes with evidence of portal venous   hypertension.   *Splenomegaly   *1.4 cm isoattenuating lesion in the main body of the RIGHT   adrenal.     *Extensive sigmoid and colonic diverticulosis without   diverticulitis   Signed by Jackie Oscar MD                 [1]   Past Medical History:  Diagnosis Date    Allergic     Anemia 2025    Clotting disorder (Multi) 2025    Diabetes mellitus (Multi)     Encounter for examination of eyes and vision without abnormal findings     Encounter for eye exam    GERD (gastroesophageal reflux disease)     Headache     Heart murmur     Hypertension     Systemic lupus erythematosus, unspecified     History of systemic lupus erythematosus (SLE)    Visual impairment    [2]   Past Surgical History:  Procedure Laterality Date     SECTION, CLASSIC       section     SECTION, LOW TRANSVERSE      ENDOMETRIAL ABLATION      Ablation    EYE SURGERY      Eye surgery    TONSILLECTOMY     [3]   Family History  Problem Relation Name Age of Onset    Glaucoma Mother      Hypertension Mother      Diabetes Father      Glaucoma  Father      Other (sarcoma) Father      Diabetes Son Bandar Rivera    [4]   Social History  Tobacco Use    Smoking status: Never    Smokeless tobacco: Never   Vaping Use    Vaping status: Never Used   Substance Use Topics    Alcohol use: Not Currently     Alcohol/week: 4.0 standard drinks of alcohol     Types: 4 Cans of beer per week    Drug use: Never        Tani Lee PA-C  07/23/25 1304

## 2025-07-24 NOTE — PROGRESS NOTES
"Daily Progress Note    Daylin Tyler is a 65 y.o. female on day 0 of admission presenting with GI bleed.    Subjective   Patient seen and examined, resting comfortably in bed.  States that she has had no further episodes of bloody bowel movements.  Denies chest pain, abdominal pain, nausea, vomiting, diarrhea, shortness of breath.  GI signed off, recommend follow-up for EGD in September to continue prophylaxis for esophageal varices.  Will plan to keep overnight to monitor for overt GI bleeding and trend hemoglobin and LFTs.       Objective   Physical Exam  Constitutional:       Appearance: Normal appearance. She is obese.   HENT:      Head: Normocephalic.      Mouth/Throat:      Mouth: Mucous membranes are moist.     Eyes:      Pupils: Pupils are equal, round, and reactive to light.       Cardiovascular:      Rate and Rhythm: Normal rate and regular rhythm.      Heart sounds: Normal heart sounds, S1 normal and S2 normal.   Pulmonary:      Effort: Pulmonary effort is normal.      Breath sounds: Normal breath sounds.   Abdominal:      General: Bowel sounds are normal.      Palpations: Abdomen is soft.      Tenderness: There is no abdominal tenderness.     Musculoskeletal:         General: Normal range of motion.      Cervical back: Neck supple.      Right lower leg: No edema.      Left lower leg: No edema.     Skin:     General: Skin is warm.     Neurological:      Mental Status: She is alert and oriented to person, place, and time.      Motor: No weakness.     Psychiatric:         Mood and Affect: Mood normal.         Behavior: Behavior normal.         Last Recorded Vitals  Blood pressure 116/54, pulse 62, temperature 36.8 °C (98.2 °F), resp. rate 18, height (!) 1.549 m (5' 1\"), weight 79.2 kg (174 lb 9.7 oz), SpO2 99%.  Intake/Output last 3 Shifts:  I/O last 3 completed shifts:  In: 547.5 (6.9 mL/kg) [I.V.:47.5 (0.6 mL/kg); IV Piggyback:500]  Out: - (0 mL/kg)   Weight: 79.2 kg     Medications  Scheduled " medications  Scheduled Medications[1]  Continuous medications  Continuous Medications[2]  PRN medications  PRN Medications[3]    Labs  CBC:   Results from last 7 days   Lab Units 07/24/25  0551 07/23/25 2039 07/23/25  1901   WBC AUTO x10*3/uL 3.7* 4.4 4.8   RBC AUTO x10*6/uL 2.82* 2.96* 2.84*   HEMOGLOBIN g/dL 8.3* 8.8* 8.5*   HEMATOCRIT % 26.0* 27.4* 26.2*   MCV fL 92 93 92   MCH pg 29.4 29.7 29.9   MCHC g/dL 31.9* 32.1 32.4   RDW % 14.1 14.0 14.0   PLATELETS AUTO x10*3/uL 67* 77* 75*     CMP:    Results from last 7 days   Lab Units 07/24/25  0551 07/23/25  1901   SODIUM mmol/L 141 139   POTASSIUM mmol/L 3.8 3.9   CHLORIDE mmol/L 108* 107   CO2 mmol/L 27 25   BUN mg/dL 8 10   CREATININE mg/dL 0.54 0.55   GLUCOSE mg/dL 103* 129*   PROTEIN TOTAL g/dL 5.4* 5.7*   CALCIUM mg/dL 8.6 8.6   BILIRUBIN TOTAL mg/dL 3.0* 2.0*   ALK PHOS U/L 208* 193*   AST U/L 139* 108*   ALT U/L 67* 50*     BMP:    Results from last 7 days   Lab Units 07/24/25  0551 07/23/25  1901   SODIUM mmol/L 141 139   POTASSIUM mmol/L 3.8 3.9   CHLORIDE mmol/L 108* 107   CO2 mmol/L 27 25   BUN mg/dL 8 10   CREATININE mg/dL 0.54 0.55   CALCIUM mg/dL 8.6 8.6   GLUCOSE mg/dL 103* 129*     Magnesium:  Results from last 7 days   Lab Units 07/24/25  0551 07/23/25  1901   MAGNESIUM mg/dL 2.03 1.59*     Troponin:    Results from last 7 days   Lab Units 07/23/25  1901   TROPHS ng/L 4     BNP:     Lipid Panel:  Results from last 7 days   Lab Units 07/24/25  0551 07/23/25  1901   INR  1.2* 1.3*   PROTIME seconds 12.8* 14.1*        Nutrition             Relevant Results  Results from last 7 days   Lab Units 07/24/25  1555 07/24/25  1108 07/24/25  0628 07/24/25  0551 07/23/25  1901   POCT GLUCOSE mg/dL 81 107* 102*  --   --    GLUCOSE mg/dL  --   --   --  103* 129*     Lab Results   Component Value Date    HGBA1C 6.3 03/13/2025        Assessment/Plan    BRBPR, likely secondary to hemorrhoids  Compensated cirrhosis with portal venous  hypertension  Splenomegaly  Celiac artery stenosis  Iron deficiency anemia  Constipation  -CT A/P without evidence of GI bleed, notes short segment stenosis of the celiac artery ostium concerning for median arcuate ligament syndrome.  Also notes moderate cirrhotic changes with portal vein hypertension, splenomegaly, and diverticulosis without diverticulitis  - EGD 7/2025 showed 3 large grade 3 esophageal varices, banded x 5 with complete eradication  - GI consult, no indication for urgent endoscopies, has a scheduled EGD in September 2025  - Per GI, continue Coreg for primary prophylaxis of esophageal varices  - Advance diet as tolerated  - PPI twice daily  - Trend LFTs and INR daily  - Encourage alcohol cessation/abstinence  - Continue iron supplement  - Optimize pain control  - Bowel regimen with MiraLAX and senna  - RUQ US shows cirrhotic liver, acute portal and hepatic venous thrombosis  - Consider pain management consult per GI    Thrombocytopenia  HTN  SLE, not in acute flare  - Thrombocytopenia appears stable, will trend with daily labs  - Continue home antihypertensives  - Continue home Plaquenil    T2DM not requiring insulin  - Hold home Rybelsus  - Okay for regular diet  - Blood glucose appears stable on morning labs      DVTp: Defer  PLAN: Home    Lupe Beltran PA-C    Plan of care was discussed extensively with patient.  Patient verbalized understanding through teach back method.  All question and concerns addressed upon examination.    Of note, this documentation is completed using the Dragon Dictation system (voice recognition software). There may be spelling and/or grammatical errors that were not corrected prior to final submission.             [1] carvedilol, 6.25 mg, oral, Nightly  ferrous sulfate, 65 mg of elemental iron, oral, Daily  folic acid, 1 mg, oral, Daily  hydroxychloroquine, 200 mg, oral, Daily  lisinopril, 30 mg, oral, Daily  pantoprazole, 40 mg, intravenous, BID  pregabalin, 150 mg,  oral, TID  [2]    [3] PRN medications: acetaminophen **OR** acetaminophen **OR** acetaminophen, ondansetron **OR** ondansetron

## 2025-07-24 NOTE — CONSULTS
"Nutrition Initial Assessment:   Nutrition Assessment    Reason for Assessment: Admission nursing screening    Patient is a 65 y.o. female presenting with GI bleed  past medical history of DM2, GERD, HTN, SLE, cirrhosis with esophageal varices s/p EGD approximately 2 weeks ago presenting to [facility] w c/o ABD cramping x2 days w BRB per rectum that is darker in the evening, takes iron supplements.       Nutrition History:  Energy Intake:  (NPO)  Pain affecting nutrition status: N/A  Food and Nutrient History: RDN consult for MST score of 3. Met with pt who reports wt loss in the past 1 month due to \"stomach issues\", GI bleed, unable to keep anything down. Pt states  lb. Pt denies following diet restrictions at home. Pt denies use of ONS. Pt reports at time has difficulty chewing - unable to state specific food items. Pt agreeable to trial of clear liquid supplements when diet is advanced.  Vitamin/Herbal Supplement Use: iron, folvite per home med list       Anthropometrics:  Height: (!) 154.9 cm (5' 1\")   Weight: 79.2 kg (174 lb 9.7 oz)   BMI (Calculated): 33.01  IBW/kg (Dietitian Calculated): 47.7 kg  Percent of IBW: 166 %                      Weight History:   Wt Readings from Last 10 Encounters:   07/24/25 79.2 kg (174 lb 9.7 oz)   07/14/25 78.9 kg (174 lb)   07/07/25 80.7 kg (178 lb)   06/09/25 81.2 kg (179 lb)   04/29/25 78.9 kg (174 lb)   04/02/25 82.1 kg (181 lb)   03/13/25 82.1 kg (181 lb)   03/05/25 81.6 kg (180 lb)   01/07/25 83.5 kg (184 lb)   07/03/24 84.4 kg (186 lb)      Weight Change %:  Weight History / % Weight Change: per chart review, 5 lb wt loss in >1 month; 10 lb, 5.4% nonsignificant wt loss in 6 months  Significant Weight Loss: No    Nutrition Focused Physical Exam Findings:    Subcutaneous Fat Loss:   Orbital Fat Pads: Well nourished (slightly bulging fat pads)  Buccal Fat Pads: Well nourished (full, rounded cheeks)  Triceps: Well nourished (ample fat tissue)  Muscle " Wasting:  Temporalis: Well nourished (well-defined muscle)  Pectoralis (Clavicular Region): Well nourished (clavicle not visible)  Deltoid/Trapezius: Well nourished (rounded appearance at arm, shoulder, neck)  Edema:  Edema: none  Physical Findings:  Skin: Negative  Digestive System Findings: Nausea, Diarrhea, Constipation    Nutrition Significant Labs:  BMP Trend:   Results from last 7 days   Lab Units 07/24/25  0551 07/23/25  1901   GLUCOSE mg/dL 103* 129*   CALCIUM mg/dL 8.6 8.6   SODIUM mmol/L 141 139   POTASSIUM mmol/L 3.8 3.9   CO2 mmol/L 27 25   CHLORIDE mmol/L 108* 107   BUN mg/dL 8 10   CREATININE mg/dL 0.54 0.55    , A1C:  Lab Results   Component Value Date    HGBA1C 6.3 03/13/2025   , BG POCT trend:   Results from last 7 days   Lab Units 07/24/25  1108 07/24/25  0628   POCT GLUCOSE mg/dL 107* 102*        Nutrition Specific Medications:  Scheduled medications  Scheduled Medications[1]  Continuous medications  Continuous Medications[2]  PRN medications  PRN Medications[3]     I/O:   Last BM Date: 07/21/25;      Dietary Orders (From admission, onward)       Start     Ordered    07/24/25 0028  May Participate in Room Service  ( ROOM SERVICE MAY PARTICIPATE)  Once        Question:  .  Answer:  Yes    07/24/25 0027    07/24/25 0013  NPO Diet; Effective now  Diet effective now         07/24/25 0012                     Estimated Needs:   Total Energy Estimated Needs in 24 hours (kCal): 1980 kCal  Method for Estimating Needs: 25 kcal/kg ABW  Total Protein Estimated Needs in 24 Hours (g): 79 g  Method for Estimating 24 Hour Protein Needs: 1 g/kg ABW  Total Fluid Estimated Needs in 24 Hours (mL): 1980 mL  Method for Estimating 24 Hour Fluid Needs: 1 ml/kcal or per MD  Patient on Order Fluid Restriction: No        Nutrition Diagnosis   Malnutrition Diagnosis  Patient has Malnutrition Diagnosis: No    Nutrition Diagnosis  Patient has Nutrition Diagnosis: Yes  Diagnosis Status (1): New  Nutrition Diagnosis 1: Altered  GI function  Related to (1): GI bleed  As Evidenced by (1): NPO status  Additional Nutrition Diagnosis: Diagnosis 2  Diagnosis Status (2): New  Nutrition Diagnosis 2: Inadequate oral intake  Related to (2): GI bleed  As Evidenced by (2): NPO status, poor po intake x 1 month prior to admission per pt report       Nutrition Interventions/Recommendations   Nutrition prescription for oral nutrition    Nutrition Recommendations:  Individualized Nutrition Prescription Provided for : Advance to Cardia, 60 g Carb Control diet with ONS when medically appropriate. Should pt remain NPO > 7 days, consider PPN to help meet estimated needs.    Nutrition Interventions/Goals:   Meals and Snacks: Carbohydrate-modified diet, Fat-modified diet, Mineral-modified diet  Goal: Consumes 3 meals per day  Medical Food Supplement: Commercial beverage medical food supplement therapy  Goal: Ensure Clear 1x/day (provides 240 kcal, 8 g protein per serving). Gelatein SF 1x/day (80 kcal and 20 g protein per serving).  Coordination of Care with Providers: Nursing      Education Documentation  No documentation found.    No needs at this time        Nutrition Monitoring and Evaluation   Estimated Energy Intake: Energy intake 50 -75% of estimated energy needs  Intake / Amount of food: Consumes at least 50% or more of meals/snacks/supplements  Additional Plans: diet advance as appropriate    Body Weight: Body weight - Maintain stable weight    Electrolyte and Renal Panel: Electrolytes within normal limits  Glucose/Endocrine Profile: Glucose within normal limits ( mg/dL)              Time Spent (min): 45 minutes            [1] carvedilol, 6.25 mg, oral, Nightly  ferrous sulfate, 65 mg of elemental iron, oral, Daily  folic acid, 1 mg, oral, Daily  hydroxychloroquine, 200 mg, oral, Daily  lisinopril, 30 mg, oral, Daily  pantoprazole, 40 mg, intravenous, BID  pregabalin, 150 mg, oral, TID  [2]    [3] PRN medications: acetaminophen **OR** acetaminophen  **OR** acetaminophen, ondansetron **OR** ondansetron

## 2025-07-24 NOTE — CARE PLAN
The patient's goals for the shift include to be able to drink water/eat  Problem: Pain - Adult  Goal: Verbalizes/displays adequate comfort level or baseline comfort level  Outcome: Progressing     Problem: Safety - Adult  Goal: Free from fall injury  Outcome: Progressing     Problem: Discharge Planning  Goal: Discharge to home or other facility with appropriate resources  Outcome: Progressing     Problem: Chronic Conditions and Co-morbidities  Goal: Patient's chronic conditions and co-morbidity symptoms are monitored and maintained or improved  Outcome: Progressing     Problem: Nutrition  Goal: Nutrient intake appropriate for maintaining nutritional needs  Outcome: Progressing     The clinical goals for the shift include safety to prevent falls/injury, maintain mobiltiy, monitor for bleeding, and update with plan of care

## 2025-07-24 NOTE — H&P
Medical Group History and Physical    ASSESSMENT & PLAN:     Abdominal cramping  S/p EGD for esophageal varices w banding performed - no bleeding [7/8/2025]  Hx: Cirrhosis w/o ascities  Hx: Transaminitis - worsening  Hx: Anemia -stable  Hypomagnesemia -replaced    Presenting to [facility] with c/o abdominal cramping x 2 days. Stable H/H worsening LFTs, monitor overnight for acute bleeding    - Consult GI - for further recommendations  - EGD 7/8/25  -dilation of distal esophagus, 3 large grade 3 varices in middle third and lower third of esophagus; NO bleeding found; placed 5 bands, mild and friable portal hypertensive gastropathy cold forceps biopsy  - Esophagram 7/9/25 hiatal hernia: Probably associated with Schatzki ring week, peristaltic wave in esophagus  - She has a scheduled follow-up appointment and procedure with GI for EGD in couple of months  - keep NPO overnight  - cont protonix 80  - RUQ US of Liver  - trend labs: CBC, CMP, Mag, Lactate  - telemetry to assess for subtle/early signs of acute blood loss  - mag replacement ordered - repeat in AM    Chronic thrombocytopenia - plt 77 today - stable  HTN - resume home meds  DM2 - accu checks AC/HS  SLE - not in flare - resume home meds    VTE Prophylaxis: defer    Viktoriya Mendoza, CHRISTINA-CNP    HISTORY OF PRESENT ILLNESS:   Chief Complaint: abdominal cramping x2days s/p EGD on  for esophageal varices  History Of Present Illness:    Daylin Tyler is a 65 y.o. female with a significant past medical history of DM2, GERD, HTN, SLE, cirrhosis with esophageal varices s/p EGD approximately 2 weeks ago presenting to [facility] w c/o ABD cramping x2 days w BRB per rectum that is darker in the evening, takes iron supplements.     Last seen by GI during hospitalization approximately 2 weeks ago.  EGD was performed on 7/8 showing dilation of the distal esophagus, 3 large grade 3 varices in the middle third and lower third of esophagus no bleeding found, however 5 bands were  placed, mild and friable portal hypertensive gastropathy cold forceps biopsy then had esophagram on 7/9/2025 findings include hiatal hernia which is probably associated with a Schatzki ring; noting peristaltic wave in esophagus.  She already has scheduled follow-up appointments with GI along with repeat EGD in a couple months.     VSS we will admit for observation overnight to rule out acute bleed, keep n.p.o.; continue Protonix; obtain right upper quadrant ultrasound of liver and baseline coag with fibrinogen.  - Consult GI for further recommendations    Review of systems: 10 point review of systems is otherwise negative except as mentioned above.    PAST HISTORIES:       Past Medical History:  Medical Problems       Problem List       * (Principal) GI bleed    Abdominal pain, acute, right upper quadrant    Age-related nuclear cataract of both eyes    Alkaline phosphatase elevation    Allergic reaction    Angular cheilosis    Arcus senilis, bilateral    GERD (gastroesophageal reflux disease)    Hypertension associated with type 2 diabetes mellitus    Overview Addendum 7/3/2024 10:42 AM by Regina Romo PA-C   - Current med: lisinopril 30 mg          Type 2 diabetes mellitus with circulatory disorder, without long-term current use of insulin    Overview Addendum 3/14/2025 11:03 AM by Regina Romo PA-C   - Most recent A1c was 6.3% (3/13/25) down from 6.9% prior   - Current meds: metformin  mg and Rybelsus 3 mg  - Most recent eGFR was 101 (2/17/25)  - Most recent UACr was negative (2/19/25)   - Pt is on ACE-I - lisinopril   - NOT on aspirin   - NOT on statin          SLE (systemic lupus erythematosus) (Multi)    Thrombocytopenia    Transaminitis    Class 2 severe obesity due to excess calories with serious comorbidity and body mass index (BMI) of 37.0 to 37.9 in adult    Iron deficiency anemia    Overview Addendum 3/14/2025 11:07 AM by Regina Romo PA-C   - Most recent labs: Hgb 8.1, MCV 72  - Tx: Ferraheme  "infusion 3/5/25  - Unclear cause at present - ?? chronic PPI         Acute upper GI bleed    Cirrhosis of liver without ascites (Multi)    Secondary esophageal varices with bleeding (Multi)           Past Surgical History:  Surgical History[1]       Social History:  She reports that she has never smoked. She has never used smokeless tobacco. She reports that she does not currently use alcohol after a past usage of about 4.0 standard drinks of alcohol per week. She reports that she does not use drugs.    Family History:  Family History[2]     Allergies:  Metformin and Sulfamethoxazole    OBJECTIVE:       Last Recorded Vitals:  Vitals:    07/23/25 1753 07/23/25 2130   BP: 151/64 117/56   BP Location: Right arm Left arm   Patient Position: Sitting Sitting   Pulse: 70 61   Resp: 18 18   Temp: 36.7 °C (98.1 °F) 36.7 °C (98.1 °F)   TempSrc: Temporal Temporal   SpO2: 99% 98%   Weight: 77.1 kg (170 lb)    Height: (!) 1.549 m (5' 1\")        Last I/O:  No intake/output data recorded.    Physical Exam  Vitals and nursing note reviewed.   Constitutional:       Appearance: Normal appearance. She is normal weight.   HENT:      Head: Normocephalic and atraumatic.      Nose: Nose normal.      Mouth/Throat:      Mouth: Mucous membranes are moist.      Pharynx: Oropharynx is clear.     Eyes:      Extraocular Movements: Extraocular movements intact.      Conjunctiva/sclera: Conjunctivae normal.      Pupils: Pupils are equal, round, and reactive to light.       Cardiovascular:      Rate and Rhythm: Normal rate and regular rhythm.      Pulses: Normal pulses.      Heart sounds: Normal heart sounds.   Pulmonary:      Effort: Pulmonary effort is normal.      Breath sounds: Normal breath sounds.      Comments: RA  Abdominal:      General: Abdomen is flat.      Palpations: Abdomen is soft.      Tenderness: There is abdominal tenderness.   Genitourinary:     Comments: Not assessed    Musculoskeletal:         General: Normal range of motion. "      Cervical back: Normal range of motion and neck supple.     Skin:     General: Skin is warm and dry.      Capillary Refill: Capillary refill takes less than 2 seconds.     Neurological:      General: No focal deficit present.      Mental Status: She is alert and oriented to person, place, and time. Mental status is at baseline.     Psychiatric:         Mood and Affect: Mood normal.         Behavior: Behavior normal.         Thought Content: Thought content normal.         Judgment: Judgment normal.           Scheduled Medications  Scheduled Medications[3]  PRN Medications  PRN Medications[4]  Continuous Medications  Continuous Medications[5]    Outpatient Medications:  Prior to Admission medications   Medication Sig Start Date End Date Taking? Authorizing Provider   belimumab 10 mg/kg in sodium chloride 0.9% IVPB Infuse 10 mg/kg into a venous catheter every 28 (twenty-eight) days.  818 mg in Nacl 0.9% 250 ml @ 250 ml/hr.    Historical Provider, MD   carvedilol (Coreg) 6.25 mg tablet Take 1 tablet (6.25 mg) by mouth once daily at bedtime. 4/29/25   Isela Man MD   ferrous sulfate 325 (65 Fe) mg EC tablet Take 1 tablet by mouth once daily with breakfast. Do not crush, chew, or split. 4/29/25   Isela Man MD   fluticasone (Flonase) 50 mcg/actuation nasal spray Administer 1 spray into each nostril once daily. Shake gently. Before first use, prime pump. After use, clean tip and replace cap. 1/7/25 1/7/26  Susana Grimm PA-C   folic acid (Folvite) 1 mg tablet Take 1 tablet (1 mg) by mouth once daily. 3/10/25 3/10/26  Regina Romo PA-C   lisinopril 30 mg tablet Take 1 tablet (30 mg) by mouth once daily. 12/2/24   Durga Villareal,    pantoprazole (ProtoNix) 40 mg EC tablet Take 1 tablet (40 mg) by mouth 2 times a day. Do not crush, chew, or split. 7/9/25   Anthony Duggan MD   PlaqueniL 200 mg tablet Take 1 tablet (200 mg) by mouth twice a day. With food    Historical Provider, MD    pregabalin (Lyrica) 150 mg capsule Take 1 capsule (150 mg) by mouth 3 times a day as needed.    Historical Provider, MD   semaglutide (Rybelsus) 7 mg tablet Take 1 tablet (7 mg) by mouth once daily. 4/15/25   Regina Romo PA-C       LABS AND IMAGING:     Labs:  Results for orders placed or performed during the hospital encounter of 07/23/25 (from the past 24 hours)   CBC and Auto Differential   Result Value Ref Range    WBC 4.8 4.4 - 11.3 x10*3/uL    nRBC 0.0 0.0 - 0.0 /100 WBCs    RBC 2.84 (L) 4.00 - 5.20 x10*6/uL    Hemoglobin 8.5 (L) 12.0 - 16.0 g/dL    Hematocrit 26.2 (L) 36.0 - 46.0 %    MCV 92 80 - 100 fL    MCH 29.9 26.0 - 34.0 pg    MCHC 32.4 32.0 - 36.0 g/dL    RDW 14.0 11.5 - 14.5 %    Platelets 75 (L) 150 - 450 x10*3/uL    Neutrophils % 77.7 40.0 - 80.0 %    Immature Granulocytes %, Automated 0.4 0.0 - 0.9 %    Lymphocytes % 10.1 13.0 - 44.0 %    Monocytes % 8.0 2.0 - 10.0 %    Eosinophils % 3.4 0.0 - 6.0 %    Basophils % 0.4 0.0 - 2.0 %    Neutrophils Absolute 3.69 1.20 - 7.70 x10*3/uL    Immature Granulocytes Absolute, Automated 0.02 0.00 - 0.70 x10*3/uL    Lymphocytes Absolute 0.48 (L) 1.20 - 4.80 x10*3/uL    Monocytes Absolute 0.38 0.10 - 1.00 x10*3/uL    Eosinophils Absolute 0.16 0.00 - 0.70 x10*3/uL    Basophils Absolute 0.02 0.00 - 0.10 x10*3/uL   Magnesium   Result Value Ref Range    Magnesium 1.59 (L) 1.60 - 2.40 mg/dL   Basic metabolic panel   Result Value Ref Range    Glucose 129 (H) 74 - 99 mg/dL    Sodium 139 136 - 145 mmol/L    Potassium 3.9 3.5 - 5.3 mmol/L    Chloride 107 98 - 107 mmol/L    Bicarbonate 25 21 - 32 mmol/L    Anion Gap 11 10 - 20 mmol/L    Urea Nitrogen 10 6 - 23 mg/dL    Creatinine 0.55 0.50 - 1.05 mg/dL    eGFR >90 >60 mL/min/1.73m*2    Calcium 8.6 8.6 - 10.3 mg/dL   Lipase   Result Value Ref Range    Lipase 19 9 - 82 U/L   Lactate   Result Value Ref Range    Lactate 1.1 0.4 - 2.0 mmol/L   Protime-INR   Result Value Ref Range    Protime 14.1 (H) 9.8 - 12.4 seconds     INR 1.3 (H) 0.9 - 1.1   Troponin I, High Sensitivity   Result Value Ref Range    Troponin I, High Sensitivity 4 0 - 13 ng/L   Hepatic function panel   Result Value Ref Range    Albumin 3.3 (L) 3.4 - 5.0 g/dL    Bilirubin, Total 2.0 (H) 0.0 - 1.2 mg/dL    Bilirubin, Direct 1.2 (H) 0.0 - 0.3 mg/dL    Alkaline Phosphatase 193 (H) 33 - 136 U/L    ALT 50 (H) 7 - 45 U/L     (H) 9 - 39 U/L    Total Protein 5.7 (L) 6.4 - 8.2 g/dL   Type and Screen   Result Value Ref Range    ABO TYPE O     Rh TYPE POS     ANTIBODY SCREEN NEG    CBC and Auto Differential   Result Value Ref Range    WBC 4.4 4.4 - 11.3 x10*3/uL    nRBC 0.0 0.0 - 0.0 /100 WBCs    RBC 2.96 (L) 4.00 - 5.20 x10*6/uL    Hemoglobin 8.8 (L) 12.0 - 16.0 g/dL    Hematocrit 27.4 (L) 36.0 - 46.0 %    MCV 93 80 - 100 fL    MCH 29.7 26.0 - 34.0 pg    MCHC 32.1 32.0 - 36.0 g/dL    RDW 14.0 11.5 - 14.5 %    Platelets 77 (L) 150 - 450 x10*3/uL    Neutrophils % 76.9 40.0 - 80.0 %    Immature Granulocytes %, Automated 0.5 0.0 - 0.9 %    Lymphocytes % 10.6 13.0 - 44.0 %    Monocytes % 8.3 2.0 - 10.0 %    Eosinophils % 3.2 0.0 - 6.0 %    Basophils % 0.5 0.0 - 2.0 %    Neutrophils Absolute 3.36 1.20 - 7.70 x10*3/uL    Immature Granulocytes Absolute, Automated 0.02 0.00 - 0.70 x10*3/uL    Lymphocytes Absolute 0.46 (L) 1.20 - 4.80 x10*3/uL    Monocytes Absolute 0.36 0.10 - 1.00 x10*3/uL    Eosinophils Absolute 0.14 0.00 - 0.70 x10*3/uL    Basophils Absolute 0.02 0.00 - 0.10 x10*3/uL        Imaging:  CT angio abdomen pelvis w and or wo IV IV contrast   Final Result   Vascular:   *No evidence of upper or lower gastrointestinal hemorrhage from   an arterial source.   *Short segment stenosis with angulation and poststenotic   dilatation centered at the celiac artery ostium.  Findings raise the   possibility for median arcuate ligament syndrome in the appropriate   clinical setting.   Abdomen/pelvis:   *Moderate cirrhotic changes with evidence of portal venous   hypertension.    *Splenomegaly   *1.4 cm isoattenuating lesion in the main body of the RIGHT   adrenal.     *Extensive sigmoid and colonic diverticulosis without   diverticulitis   Signed by Jackie Oscar MD                [1]   Past Surgical History:  Procedure Laterality Date     SECTION, CLASSIC       section     SECTION, LOW TRANSVERSE      ENDOMETRIAL ABLATION      Ablation    EYE SURGERY      Eye surgery    TONSILLECTOMY     [2]   Family History  Problem Relation Name Age of Onset    Glaucoma Mother      Hypertension Mother      Diabetes Father      Glaucoma Father      Other (sarcoma) Father      Diabetes Son Bandar M Nicole    [3] pantoprazole, 80 mg, intravenous, Daily    [4] [5]

## 2025-07-24 NOTE — CONSULTS
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     MELD 3.0: 15 at 2025  5:51 AM  MELD-Na: 13 at 2025  5:51 AM  Calculated from:  Serum Creatinine: 0.54 mg/dL (Using min of 1 mg/dL) at 2025  5:51 AM  Serum Sodium: 141 mmol/L (Using max of 137 mmol/L) at 2025  5:51 AM  Total Bilirubin: 3 mg/dL at 2025  5:51 AM  Serum Albumin: 3.2 g/dL at 2025  5:51 AM  INR(ratio): 1.2 at 2025  5:51 AM  Age at listing (hypothetical): 65 years  Sex: Female at 2025  5:51 AM    -Acute hepatitis panel was negative in . Suspect combination of EtOH given prior hx as well as MASLD.   - Ascites: no appreciable ascites   - HE: no history of encephalopathy  - EV screenin2025 Grade III EV, s/p bands x5 with complete eradication   - HCC: No liver lesion.       BRBPR x 1 occurrence after using laxative to relieve constipation 2/2 outlet bleeding from hemorrhoids  Compensated cirrhosis with evidence of portal venous hypertension, splenomegaly  Diverticulosis without diverticulitis noted on CT and colonoscopy  Celiac artery stenosis, concern for median arcuate ligament syndrome noted on CT  Normocytic anemia, iron deficiency   Constipation, consider IBS    - No indication for urgent endoscopies  - Recent EGD 2025 with three large grade III esophageal varices, banded x5 with complete eradication  - Continue Coreg twice daily for primary prophylaxis of esophageal varices, if HR remains >60 and BP allows recommend increasing dose to BID   - Repeat EGD 2025 at 10 A.M. with   Magda as already scheduled   - Continue monitoring hemoglobin, transfuse if hemoglobin less than 7  - Continue to monitor for signs of overt GIB  - Continue pantoprazole 40 mg IV/p.o. twice daily  - May advance diet as tolerated, low-salt, soft diet  - Monitor for signs of encephalopathy  - Abstain from alcohol   - Trend LFTs and INR daily   - Avoid constipation   - Recommend high fiber diet  - Recommend MiraLax 1-2 days as daily as needed for constipation- Gas-X PRN  - Senna BID  - Continue iron supplement   - Supportive care per medicine team  - Consider pain management consult to evaluate and treat MALS, celiac stenosis   - Follow up with Dr. Man after EGD in September 2025.      Discussed with Dr. Smith      Reason for Consult: BRBPR    History of Present Illness    Daylin Tyler is a 65 y.o. 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 with chief complaint of abdominal pain and intermittent episodes of hematochezia in which GI has been consulted.  Patient presented to the emergency room with abdominal pain and cramping x 2 days.  Patient reported epigastric pain occasionally radiating to her back described as a stabbing and constant sensation which was better when she leans forward and nothing specifically was making it worse.  Patient also had 1 episode of bloody stool on Monday.  Initially, patient was constipated and needed to use laxative to have bowel movement.  After the bowel movement she noticed blood in toilet and on toilet paper with wiping.  Then she had 2 dark stools later that night.  Patient is on iron for JESENIA.  Patient's last bowel movement yesterday was normal soft and brown.    On arrival to ED, patient was afebrile and hemodynamically stable.  Initial blood work shows normal WBC of 4.8.   Normocytic anemia with Hgb 8.5, HCT 26.2%, MCV 92 and platelet count 75.  PT 14.1, INR 1.3.  BMP with mild hyperglycemia serum glucose 129, potassium 3.9, bicarbonate 25, BUN 10, creatinine 0.55.  Albumin 3.3.  Elevated LFTs with alkaline phosphatase 193, ALT 50, , total bilirubin 2.0, direct bilirubin 1.2.  Lactate and lipase and troponin were normal.  CT of the abdomen shows no evidence of upper or lower GI hemorrhage from arterial source. Short segment stenosis with angulation and poststenotic dilatation centered at the celiac artery ostium.  Findings raise the possibility for median arcuate ligament syndrome. Moderate cirrhotic changes with evidence of portal venous hypertension and splenomegaly noted. A 1.4 cm isoattenuating lesion in the main body of the RIGHT adrenal. And extensive sigmoid and colonic diverticulosis without diverticulitis.  GI consulted for reports of bright red blood per rectum.    Patient did have recent GI bleed underwent EGD July 8, 2025 which showed 3 large grade 3 varices with no stigmata of bleeding but given recent bleeding and size of grade of varices, 5 bands were successfully placed with complete eradication.  Also noted was mild and friable portal hypertensive gastropathy in the body of the stomach and antrum.  Otherwise EGD was unremarkable.  Patient was sent home with PPI 40 daily as well as continuing Coreg twice daily for primary prophylaxis of varices.  Patient was doing well up until Monday of this week.  Patient reported having mid abdominal pain on the right side associated with constipation.  She took a laxative and once that kicked in she had straining to have a bowel movement and noted bright red blood in toilet and on toilet paper after BM.  After that patient had several dark-colored stools afterwards which is most likely related to her iron supplement.  Patient's last bowel movement was yesterday (Wednesday) which was soft, formed and brown.  Today patient has  no bowel movement.  Abdominal pain is improving but she has been on bowel rest.  No nausea, no vomiting, no coffee-ground emesis or hematemesis.    ENDOSCOPIC REVIEW  EGD: 7/8/2025 with Dr. Elizabeth indicated for epigastric pain upper GI bleed, cirrhosis with EV  Impression  Dilation in the distal esophagus with a medium amount of fluid with abnormal mucosa  Three large grade III varices in the middle third of the esophagus and lower third of the esophagus; there was no stigmata of bleeding but given recent bleeding and size and grade of varices decision was made to band. Placed 5 bands successfully, resulting in complete eradication  Mild and friable portal hypertensive gastropathy in the body of the stomach and antrum; performed cold forceps biopsy  The duodenum appeared normal. Performed random biopsy to rule out celiac disease.    EGD: 4/29/2025 with Dr. Man indicated for JESENIA  Impression  2 cm hiatal hernia  Medium varices in the middle third of the esophagus and lower third of the esophagus  Severe and friable portal hypertensive gastropathy in the body of the stomach and antrum; performed cold forceps biopsy  The duodenum appeared normal. Performed random biopsy to rule out celiac disease.    Colonoscopy: 4/29/2025 with Dr. Man indicated for JESENIA  Impression  Patchy areas of friable mucosa in the ascending colon suspicious for mild portal colopathy  Large hemorrhoids  Repeat colonoscopy in 10 years due April, 2035  Continue iron supplementation  Anemia likely multifactorial secondary to mucosal friability from portal hypertensive gastropathy and colopathy    Allergies  Metformin and Sulfamethoxazole    Current Medication  Current Medications[1]    Past Medical History  Active Ambulatory Problems     Diagnosis Date Noted    Abdominal pain, acute, right upper quadrant 04/07/2023    Age-related nuclear cataract of both eyes 04/07/2023    Alkaline phosphatase elevation 04/07/2023    Allergic reaction  04/07/2023    Angular cheilosis 04/07/2023    Arcus senilis, bilateral 04/07/2023    Costochondritis 04/07/2023    Foot pain 04/07/2023    Foot sprain 04/07/2023    Foreign body of left ear 04/07/2023    GERD (gastroesophageal reflux disease) 04/07/2023    Fibromyalgia 04/07/2023    Headache 04/07/2023    Hypertension associated with type 2 diabetes mellitus 04/07/2023    Posterior subcapsular age-related cataract of left eye 04/07/2023    Type 2 diabetes mellitus with circulatory disorder, without long-term current use of insulin 04/07/2023    SLE (systemic lupus erythematosus) (Multi) 04/07/2023    Sore in mouth 04/07/2023    Thrombocytopenia 04/07/2023    Tinea versicolor 04/07/2023    Transaminitis 04/07/2023    Varicose vein of leg 04/07/2023    Varicose veins of lower extremity 04/07/2023    Class 2 severe obesity due to excess calories with serious comorbidity and body mass index (BMI) of 37.0 to 37.9 in adult 04/07/2023    Knee internal derangement 04/07/2023    Knee pain 04/07/2023    Knee sprain 04/07/2023    Annual physical exam 04/24/2023    Iron deficiency anemia 02/21/2025    Acute upper GI bleed 07/07/2025    Cirrhosis of liver without ascites (Multi) 07/14/2025    Secondary esophageal varices with bleeding (Multi) 07/14/2025     Resolved Ambulatory Problems     Diagnosis Date Noted    Vitamin D deficiency 04/07/2023     Past Medical History:   Diagnosis Date    Allergic     Anemia 04/2025    Clotting disorder (Multi) 06/2025    Diabetes mellitus (Multi)     Encounter for examination of eyes and vision without abnormal findings     Heart murmur     Hypertension     Systemic lupus erythematosus, unspecified     Visual impairment        Past Surgical History  Surgical History[2]    Family History  Family History[3]      Social History  TOBACCO:  reports that she has never smoked. She has never used smokeless tobacco.  ETOH:  reports current alcohol use.  DRUGS:  reports no history of drug use.    Review  "of Systems  Review of systems negative unless otherwise stated above.    PHYSICAL EXAM  VS: /60   Pulse 60   Temp 36.3 °C (97.3 °F)   Resp 18   Ht (!) 1.549 m (5' 1\")   Wt 79.2 kg (174 lb 9.7 oz)   SpO2 96%   BMI 32.99 kg/m²  Body mass index is 32.99 kg/m².  General: Well-developed obese female, in no acute distress  HEENT: AT, NC, no JVD, no lymphadenopathy, neck supple  Lungs: Clear, no wheezing, no crackles  Cardiac: Normal S1-S2, no murmur, no gallop  Abdomen: Soft, obese, nontender, no distention, positive bowel sound  Extremities: No deformity, no edema, pulses intact, ROM intact  Neurological: Alert awake oriented x3, sensation intact, clear speech    DATA  Recent blood work and relevant radiology and endoscopic studies were reviewed and discussed with the patient   Results from last 7 days   Lab Units 07/24/25  0551   WBC AUTO x10*3/uL 3.7*   RBC AUTO x10*6/uL 2.82*   HEMOGLOBIN g/dL 8.3*   HEMATOCRIT % 26.0*   MCV fL 92   MCHC g/dL 31.9*   RDW % 14.1   PLATELETS AUTO x10*3/uL 67*       Results from last 72 hours   Lab Units 07/24/25  0551   SODIUM mmol/L 141   POTASSIUM mmol/L 3.8   CHLORIDE mmol/L 108*   CO2 mmol/L 27   BUN mg/dL 8   CREATININE mg/dL 0.54   CALCIUM mg/dL 8.6   PROTEIN TOTAL g/dL 5.4*   BILIRUBIN TOTAL mg/dL 3.0*   ALK PHOS U/L 208*   AST U/L 139*   ALT U/L 67*       Results from last 72 hours   Lab Units 07/24/25  0551   INR  1.2*         RADIOLOGY REVIEW  === 07/23/25 ===    US RIGHT UPPER QUADRANT    - Impression -  While reviewing today's ultrasound images, I have also reviewed both  recent CTs, 7 July 2025 and 23 July 2025    On CT from 7 July 2025, coronal series 502, image 63, I have  annotated with a [place] a calcified stone that, at that time, was in  the cystic duct or gallbladder neck. On CT 7 July 2025, there was no  calcification/stone near the ampulla    On CT from 23 July 2025, coronal series 602, image 71, I have  annotated with a [place] that same 4 mm stone " which by that time had  dropped down the CBD all the way to the ampulla, not causing any  appreciable significant change in caliber configuration of the  upstream biliary tree, but I note the hyperbilirubinemia that  developed between the two CTs    Yesterday's CT confirms choledocholithiasis. As is always the case on  ultrasound, the distal CBD/ampulla cannot be visualized due to  shadowing bowel gas, but the CT finding is absolutely certain, not  requiring MRCP two confirmed but may be obtained at clinician's  discretion to assess for additional stones which may not be visible  on CT    Redemonstration of cirrhotic liver    Yesterday's CT with arterial phase postcontrast imaging was far more  sensitive and specific for any potential hepatocellular carcinoma and  there was no arterial-enhancing liver lesion    Yesterday's CT with portal venous phase postcontrast imaging  confirmed the splenic vein, main and intrahepatic portal veins are  all patent; acute portal venous thrombosis    Yesterday's CT with portal venous phase postcontrast imaging  confirmed all three hepatic veins are patent; no acute hepatic venous  thrombosis    Yesterday's complete CT abdomen and pelvis confirmed absence of  ascites anywhere in the abdomen or pelvis. Still no ascites in the  right upper quadrant on today's ultrasound    Tiny sandlike, calcified gallstones confirmed on yesterday's CT do  not project as well on today's ultrasound. Gallbladder no longer  dilated. The mild gallbladder wall thickening present yesterday and  again today is nonspecific in the absence of gallbladder dilation and  suspected hepatitis    MACRO:  None    Signed by: Mukund Nunez 7/24/2025 10:07 AM  Dictation workstation:   [id number]      === 07/23/25 ===    CT ABDOMEN PELVIS ANGIOGRAM W AND/OR WO IV CONTRAST    - Impression -  Vascular:  *No evidence of upper or lower gastrointestinal hemorrhage from  an arterial source.  *Short segment stenosis with  angulation and poststenotic  dilatation centered at the celiac artery ostium.  Findings raise the  possibility for median arcuate ligament syndrome in the appropriate  clinical setting.  Abdomen/pelvis:  *Moderate cirrhotic changes with evidence of portal venous  hypertension.  *Splenomegaly  *1.4 cm isoattenuating lesion in the main body of the RIGHT  adrenal.  *Extensive sigmoid and colonic diverticulosis without  diverticulitis  Signed by Jackie Oscar MD       (Electronically signed byEMMANUEL Mcnamara on 2025 at 2:16 PM)       [1]   Current Facility-Administered Medications:     acetaminophen (Tylenol) tablet 650 mg, 650 mg, oral, q4h PRN **OR** acetaminophen (Tylenol) oral liquid 650 mg, 650 mg, oral, q4h PRN **OR** acetaminophen (Tylenol) suppository 650 mg, 650 mg, rectal, q4h PRN, EMMANUEL Soto    carvedilol (Coreg) tablet 6.25 mg, 6.25 mg, oral, Nightly, EMMANUEL Soto    ferrous sulfate 325 mg (65 mg elemental) tablet 1 tablet, 65 mg of elemental iron, oral, Daily, EMMANUEL Soto    folic acid (Folvite) tablet 1 mg, 1 mg, oral, Daily, EMMANUEL Soto, 1 mg at 25 1200    hydroxychloroquine (Plaquenil) tablet 200 mg, 200 mg, oral, Daily, EMMANUEL Soto, 200 mg at 25 1200    lisinopril tablet 30 mg, 30 mg, oral, Daily, CHRISTINA Soto-CNP, 30 mg at 25 1200    ondansetron (Zofran) tablet 4 mg, 4 mg, oral, q8h PRN **OR** ondansetron (Zofran) injection 4 mg, 4 mg, intravenous, q8h PRN, EMMANUEL Soto    pantoprazole (Protonix) injection 40 mg, 40 mg, intravenous, BID, Susana Rodriguez MD, 40 mg at 25 0947    pregabalin (Lyrica) capsule 150 mg, 150 mg, oral, TID, CHRISTINA Soto-CNP, 150 mg at 25 1200  [2]   Past Surgical History:  Procedure Laterality Date     SECTION, CLASSIC       section     SECTION, LOW TRANSVERSE      ENDOMETRIAL ABLATION      Ablation    EYE SURGERY       Eye surgery    TONSILLECTOMY     [3]   Family History  Problem Relation Name Age of Onset    Glaucoma Mother      Hypertension Mother      Diabetes Father      Glaucoma Father      Other (sarcoma) Father      Diabetes Son Bandar CAIT Rivera

## 2025-07-25 PROBLEM — R10.13 EPIGASTRIC ABDOMINAL PAIN: Status: ACTIVE | Noted: 2025-07-25

## 2025-07-25 LAB
ALBUMIN SERPL BCP-MCNC: 3 G/DL (ref 3.4–5)
ALP SERPL-CCNC: 231 U/L (ref 33–136)
ALT SERPL W P-5'-P-CCNC: 82 U/L (ref 7–45)
ANION GAP SERPL CALC-SCNC: 8 MMOL/L (ref 10–20)
AST SERPL W P-5'-P-CCNC: 149 U/L (ref 9–39)
BASOPHILS # BLD AUTO: 0.03 X10*3/UL (ref 0–0.1)
BASOPHILS NFR BLD AUTO: 0.8 %
BILIRUB SERPL-MCNC: 2.4 MG/DL (ref 0–1.2)
BUN SERPL-MCNC: 9 MG/DL (ref 6–23)
CALCIUM SERPL-MCNC: 8.3 MG/DL (ref 8.6–10.3)
CHLORIDE SERPL-SCNC: 109 MMOL/L (ref 98–107)
CO2 SERPL-SCNC: 27 MMOL/L (ref 21–32)
CREAT SERPL-MCNC: 0.52 MG/DL (ref 0.5–1.05)
EGFRCR SERPLBLD CKD-EPI 2021: >90 ML/MIN/1.73M*2
EOSINOPHIL # BLD AUTO: 0.24 X10*3/UL (ref 0–0.7)
EOSINOPHIL NFR BLD AUTO: 6.5 %
ERYTHROCYTE [DISTWIDTH] IN BLOOD BY AUTOMATED COUNT: 13.6 % (ref 11.5–14.5)
GLUCOSE BLD MANUAL STRIP-MCNC: 144 MG/DL (ref 74–99)
GLUCOSE BLD MANUAL STRIP-MCNC: 152 MG/DL (ref 74–99)
GLUCOSE BLD MANUAL STRIP-MCNC: 163 MG/DL (ref 74–99)
GLUCOSE BLD MANUAL STRIP-MCNC: 168 MG/DL (ref 74–99)
GLUCOSE SERPL-MCNC: 134 MG/DL (ref 74–99)
HCT VFR BLD AUTO: 26.2 % (ref 36–46)
HGB BLD-MCNC: 8.4 G/DL (ref 12–16)
HOLD SPECIMEN: NORMAL
IMM GRANULOCYTES # BLD AUTO: 0.01 X10*3/UL (ref 0–0.7)
IMM GRANULOCYTES NFR BLD AUTO: 0.3 % (ref 0–0.9)
LYMPHOCYTES # BLD AUTO: 0.51 X10*3/UL (ref 1.2–4.8)
LYMPHOCYTES NFR BLD AUTO: 13.7 %
MAGNESIUM SERPL-MCNC: 1.73 MG/DL (ref 1.6–2.4)
MCH RBC QN AUTO: 29.5 PG (ref 26–34)
MCHC RBC AUTO-ENTMCNC: 32.1 G/DL (ref 32–36)
MCV RBC AUTO: 92 FL (ref 80–100)
MONOCYTES # BLD AUTO: 0.35 X10*3/UL (ref 0.1–1)
MONOCYTES NFR BLD AUTO: 9.4 %
NEUTROPHILS # BLD AUTO: 2.57 X10*3/UL (ref 1.2–7.7)
NEUTROPHILS NFR BLD AUTO: 69.3 %
NRBC BLD-RTO: 0 /100 WBCS (ref 0–0)
PLATELET # BLD AUTO: 67 X10*3/UL (ref 150–450)
POTASSIUM SERPL-SCNC: 4.2 MMOL/L (ref 3.5–5.3)
PROT SERPL-MCNC: 5.4 G/DL (ref 6.4–8.2)
RBC # BLD AUTO: 2.85 X10*6/UL (ref 4–5.2)
SODIUM SERPL-SCNC: 140 MMOL/L (ref 136–145)
WBC # BLD AUTO: 3.7 X10*3/UL (ref 4.4–11.3)

## 2025-07-25 PROCEDURE — 2500000004 HC RX 250 GENERAL PHARMACY W/ HCPCS (ALT 636 FOR OP/ED): Performed by: HOSPITALIST

## 2025-07-25 PROCEDURE — 82947 ASSAY GLUCOSE BLOOD QUANT: CPT

## 2025-07-25 PROCEDURE — 99232 SBSQ HOSP IP/OBS MODERATE 35: CPT

## 2025-07-25 PROCEDURE — 80053 COMPREHEN METABOLIC PANEL: CPT | Performed by: NURSE PRACTITIONER

## 2025-07-25 PROCEDURE — 2500000002 HC RX 250 W HCPCS SELF ADMINISTERED DRUGS (ALT 637 FOR MEDICARE OP, ALT 636 FOR OP/ED)

## 2025-07-25 PROCEDURE — 99222 1ST HOSP IP/OBS MODERATE 55: CPT | Performed by: REGISTERED NURSE

## 2025-07-25 PROCEDURE — 2500000004 HC RX 250 GENERAL PHARMACY W/ HCPCS (ALT 636 FOR OP/ED)

## 2025-07-25 PROCEDURE — 83735 ASSAY OF MAGNESIUM: CPT | Performed by: NURSE PRACTITIONER

## 2025-07-25 PROCEDURE — 2500000001 HC RX 250 WO HCPCS SELF ADMINISTERED DRUGS (ALT 637 FOR MEDICARE OP): Performed by: NURSE PRACTITIONER

## 2025-07-25 PROCEDURE — 36415 COLL VENOUS BLD VENIPUNCTURE: CPT

## 2025-07-25 PROCEDURE — 36415 COLL VENOUS BLD VENIPUNCTURE: CPT | Performed by: NURSE PRACTITIONER

## 2025-07-25 PROCEDURE — 87075 CULTR BACTERIA EXCEPT BLOOD: CPT | Mod: ELYLAB

## 2025-07-25 PROCEDURE — 85025 COMPLETE CBC W/AUTO DIFF WBC: CPT | Performed by: NURSE PRACTITIONER

## 2025-07-25 PROCEDURE — 99233 SBSQ HOSP IP/OBS HIGH 50: CPT | Performed by: NURSE PRACTITIONER

## 2025-07-25 PROCEDURE — 1200000002 HC GENERAL ROOM WITH TELEMETRY DAILY

## 2025-07-25 RX ORDER — DEXTROSE 50 % IN WATER (D50W) INTRAVENOUS SYRINGE
25
Status: DISCONTINUED | OUTPATIENT
Start: 2025-07-25 | End: 2025-07-29 | Stop reason: HOSPADM

## 2025-07-25 RX ORDER — INSULIN LISPRO 100 [IU]/ML
0-15 INJECTION, SOLUTION INTRAVENOUS; SUBCUTANEOUS
Status: DISCONTINUED | OUTPATIENT
Start: 2025-07-25 | End: 2025-07-29 | Stop reason: HOSPADM

## 2025-07-25 RX ORDER — DEXTROSE 50 % IN WATER (D50W) INTRAVENOUS SYRINGE
12.5
Status: DISCONTINUED | OUTPATIENT
Start: 2025-07-25 | End: 2025-07-29 | Stop reason: HOSPADM

## 2025-07-25 RX ORDER — AMOXICILLIN 250 MG
1 CAPSULE ORAL 2 TIMES DAILY
Status: DISCONTINUED | OUTPATIENT
Start: 2025-07-25 | End: 2025-07-29 | Stop reason: HOSPADM

## 2025-07-25 RX ORDER — POLYETHYLENE GLYCOL 3350 17 G/17G
17 POWDER, FOR SOLUTION ORAL DAILY
Status: DISCONTINUED | OUTPATIENT
Start: 2025-07-25 | End: 2025-07-29 | Stop reason: HOSPADM

## 2025-07-25 RX ADMIN — PREGABALIN 150 MG: 50 CAPSULE ORAL at 09:26

## 2025-07-25 RX ADMIN — DOCUSATE SODIUM 50MG AND SENNOSIDES 8.6MG 1 TABLET: 8.6; 5 TABLET, FILM COATED ORAL at 20:45

## 2025-07-25 RX ADMIN — LISINOPRIL 30 MG: 20 TABLET ORAL at 09:26

## 2025-07-25 RX ADMIN — INSULIN LISPRO 3 UNITS: 100 INJECTION, SOLUTION INTRAVENOUS; SUBCUTANEOUS at 17:28

## 2025-07-25 RX ADMIN — PIPERACILLIN SODIUM AND TAZOBACTAM SODIUM 3.38 G: 3; .375 INJECTION, SOLUTION INTRAVENOUS at 20:45

## 2025-07-25 RX ADMIN — FOLIC ACID 1 MG: 1 TABLET ORAL at 09:26

## 2025-07-25 RX ADMIN — HYDROXYCHLOROQUINE SULFATE 200 MG: 200 TABLET, FILM COATED ORAL at 09:26

## 2025-07-25 RX ADMIN — INSULIN LISPRO 3 UNITS: 100 INJECTION, SOLUTION INTRAVENOUS; SUBCUTANEOUS at 12:36

## 2025-07-25 RX ADMIN — PREGABALIN 150 MG: 50 CAPSULE ORAL at 20:45

## 2025-07-25 RX ADMIN — ACETAMINOPHEN 650 MG: 325 TABLET ORAL at 04:22

## 2025-07-25 RX ADMIN — PANTOPRAZOLE SODIUM 40 MG: 40 INJECTION, POWDER, FOR SOLUTION INTRAVENOUS at 20:44

## 2025-07-25 RX ADMIN — PANTOPRAZOLE SODIUM 40 MG: 40 INJECTION, POWDER, FOR SOLUTION INTRAVENOUS at 09:26

## 2025-07-25 RX ADMIN — PIPERACILLIN SODIUM AND TAZOBACTAM SODIUM 3.38 G: 3; .375 INJECTION, SOLUTION INTRAVENOUS at 16:02

## 2025-07-25 RX ADMIN — PREGABALIN 150 MG: 50 CAPSULE ORAL at 14:00

## 2025-07-25 RX ADMIN — DOCUSATE SODIUM 50MG AND SENNOSIDES 8.6MG 1 TABLET: 8.6; 5 TABLET, FILM COATED ORAL at 12:36

## 2025-07-25 RX ADMIN — FERROUS SULFATE TAB 325 MG (65 MG ELEMENTAL FE) 1 TABLET: 325 (65 FE) TAB at 09:26

## 2025-07-25 RX ADMIN — CARVEDILOL 6.25 MG: 6.25 TABLET, FILM COATED ORAL at 20:45

## 2025-07-25 ASSESSMENT — PAIN SCALES - GENERAL
PAINLEVEL_OUTOF10: 0 - NO PAIN
PAINLEVEL_OUTOF10: 0 - NO PAIN

## 2025-07-25 ASSESSMENT — ENCOUNTER SYMPTOMS
ABDOMINAL PAIN: 1
BLOOD IN STOOL: 1

## 2025-07-25 ASSESSMENT — COGNITIVE AND FUNCTIONAL STATUS - GENERAL
MOBILITY SCORE: 24
MOBILITY SCORE: 23
CLIMB 3 TO 5 STEPS WITH RAILING: A LITTLE
DAILY ACTIVITIY SCORE: 24
DAILY ACTIVITIY SCORE: 24

## 2025-07-25 ASSESSMENT — PAIN - FUNCTIONAL ASSESSMENT
PAIN_FUNCTIONAL_ASSESSMENT: 0-10
PAIN_FUNCTIONAL_ASSESSMENT: 0-10

## 2025-07-25 ASSESSMENT — ACTIVITIES OF DAILY LIVING (ADL): LACK_OF_TRANSPORTATION: NO

## 2025-07-25 NOTE — H&P (VIEW-ONLY)
Department of Internal Medicine  Gastroenterology  Progress note      Subjective  GI is following for rectal bleeding with history of cirrhosis.  Now noted to have choledocholithiasis on imaging.     Patient reports she has had right upper quadrant pain that still persists with food.  No nausea/vomiting.  Overall HDS, afebrile.  Patient denies any new abdominal distention.  Remains alert and oriented.  Patient reports she had a very small bowel movement today with no further signs of bleeding.  Patient reports flatulence, but not having a good sized bowel movement since Monday, requesting bowel regimen.  Hemoglobin is overall remained stable.    GI initially consulted for rectal bleeding that has seemed to subside on its own.  However, now noted to have choledocholithiasis on imaging with elevated LFTs and RUQ pain.  No current cholangitis.      Discussed plan of care at length with the patient and family at bedside.  All questions answered and verbalized understanding with plan for ERCP for Monday.  Benefits versus risks of ERCP discussed with the patient/family and agreeable to proceed.  Patient denies any anticoagulation at home.    Discussed patient with multiple members of the medical team at [place] and [facility] including advanced GI, Dr. Schulz, endoscopy team at [facility] and medical team at [place].  [place] updated to schedule for transport Monday morning.      Current Medication  Current Medications[1]    Past Medical History  Active Ambulatory Problems     Diagnosis Date Noted    Abdominal pain, acute, right upper quadrant 04/07/2023    Age-related nuclear cataract of both eyes 04/07/2023    Alkaline phosphatase elevation 04/07/2023    Allergic reaction 04/07/2023    Angular cheilosis 04/07/2023    Arcus senilis, bilateral 04/07/2023    Costochondritis 04/07/2023    Foot pain 04/07/2023    Foot sprain 04/07/2023    Foreign body of left ear 04/07/2023    GERD (gastroesophageal reflux disease) 04/07/2023  "   Fibromyalgia 04/07/2023    Headache 04/07/2023    Hypertension associated with type 2 diabetes mellitus 04/07/2023    Posterior subcapsular age-related cataract of left eye 04/07/2023    Type 2 diabetes mellitus with circulatory disorder, without long-term current use of insulin 04/07/2023    SLE (systemic lupus erythematosus) (Multi) 04/07/2023    Sore in mouth 04/07/2023    Thrombocytopenia 04/07/2023    Tinea versicolor 04/07/2023    Transaminitis 04/07/2023    Varicose vein of leg 04/07/2023    Varicose veins of lower extremity 04/07/2023    Class 2 severe obesity due to excess calories with serious comorbidity and body mass index (BMI) of 37.0 to 37.9 in adult 04/07/2023    Knee internal derangement 04/07/2023    Knee pain 04/07/2023    Knee sprain 04/07/2023    Annual physical exam 04/24/2023    Iron deficiency anemia 02/21/2025    Acute upper GI bleed 07/07/2025    Cirrhosis of liver without ascites (Multi) 07/14/2025    Secondary esophageal varices with bleeding (Multi) 07/14/2025     Resolved Ambulatory Problems     Diagnosis Date Noted    Vitamin D deficiency 04/07/2023     Past Medical History:   Diagnosis Date    Allergic     Anemia 04/2025    Clotting disorder (Multi) 06/2025    Diabetes mellitus (Multi)     Encounter for examination of eyes and vision without abnormal findings     Heart murmur     Hypertension     Systemic lupus erythematosus, unspecified     Visual impairment        PHYSICAL EXAM  VS: /53   Pulse 60   Temp 36.1 °C (97 °F)   Resp 16   Ht (!) 1.549 m (5' 1\")   Wt 79.2 kg (174 lb 9.7 oz)   SpO2 98%   BMI 32.99 kg/m²  Body mass index is 32.99 kg/m².  Physical Exam  Well-developed, no acute distress, obese, lungs clear, nonlabored breathing, RRR, abdomen soft, obese,  +RUQ tenderness to palpation, no distention, no peripheral edema, awake alert and oriented x 3, clear speech, no asterixis, appropriate mood and behavior    DATA  Recent blood work and relevant radiology and " endoscopic studies were reviewed and discussed with the patient   Results from last 7 days   Lab Units 07/25/25  0631   WBC AUTO x10*3/uL 3.7*   RBC AUTO x10*6/uL 2.85*   HEMOGLOBIN g/dL 8.4*   HEMATOCRIT % 26.2*   MCV fL 92   MCHC g/dL 32.1   RDW % 13.6   PLATELETS AUTO x10*3/uL 67*       Results from last 72 hours   Lab Units 07/25/25  0631   SODIUM mmol/L 140   POTASSIUM mmol/L 4.2   CHLORIDE mmol/L 109*   CO2 mmol/L 27   BUN mg/dL 9   CREATININE mg/dL 0.52   CALCIUM mg/dL 8.3*   PROTEIN TOTAL g/dL 5.4*   BILIRUBIN TOTAL mg/dL 2.4*   ALK PHOS U/L 231*   AST U/L 149*   ALT U/L 82*       Results from last 72 hours   Lab Units 07/24/25  0551   INR  1.2*       Results from last 72 hours   Lab Units 07/23/25  1901   LIPASE U/L 19       RADIOLOGY REVIEW  Ultrasound right upper quadrant 7/20/2025:  IMPRESSION:  While reviewing today's ultrasound images, I have also reviewed both  recent CTs, 7 July 2025 and 23 July 2025      On CT from 7 July 2025, coronal series 502, image 63, I have  annotated with a [place] a calcified stone that, at that time, was in  the cystic duct or gallbladder neck. On CT 7 July 2025, there was no  calcification/stone near the ampulla      On CT from 23 July 2025, coronal series 602, image 71, I have  annotated with a [place] that same 4 mm stone which by that time had  dropped down the CBD all the way to the ampulla, not causing any  appreciable significant change in caliber configuration of the  upstream biliary tree, but I note the hyperbilirubinemia that  developed between the two CTs      Yesterday's CT confirms choledocholithiasis. As is always the case on  ultrasound, the distal CBD/ampulla cannot be visualized due to  shadowing bowel gas, but the CT finding is absolutely certain, not  requiring MRCP two confirmed but may be obtained at clinician's  discretion to assess for additional stones which may not be visible  on CT      Redemonstration of cirrhotic liver      Yesterday's CT with  arterial phase postcontrast imaging was far more  sensitive and specific for any potential hepatocellular carcinoma and  there was no arterial-enhancing liver lesion      Yesterday's CT with portal venous phase postcontrast imaging  confirmed the splenic vein, main and intrahepatic portal veins are  all patent; acute portal venous thrombosis      Yesterday's CT with portal venous phase postcontrast imaging  confirmed all three hepatic veins are patent; no acute hepatic venous  thrombosis      Yesterday's complete CT abdomen and pelvis confirmed absence of  ascites anywhere in the abdomen or pelvis. Still no ascites in the  right upper quadrant on today's ultrasound      Tiny sandlike, calcified gallstones confirmed on yesterday's CT do  not project as well on today's ultrasound. Gallbladder no longer  dilated. The mild gallbladder wall thickening present yesterday and  again today is nonspecific in the absence of gallbladder dilation and  suspected hepatitis    ENDOSCOPIC REVIEW  EGD: 7/8/2025 with Dr. Elizabeth indicated for epigastric pain upper GI bleed, cirrhosis with EV  Impression  Dilation in the distal esophagus with a medium amount of fluid with abnormal mucosa  Three large grade III varices in the middle third of the esophagus and lower third of the esophagus; there was no stigmata of bleeding but given recent bleeding and size and grade of varices decision was made to band. Placed 5 bands successfully, resulting in complete eradication  Mild and friable portal hypertensive gastropathy in the body of the stomach and antrum; performed cold forceps biopsy  The duodenum appeared normal. Performed random biopsy to rule out celiac disease.     EGD: 4/29/2025 with Dr. Man indicated for JESENIA  Impression  2 cm hiatal hernia  Medium varices in the middle third of the esophagus and lower third of the esophagus  Severe and friable portal hypertensive gastropathy in the body of the stomach and antrum; performed cold  forceps biopsy  The duodenum appeared normal. Performed random biopsy to rule out celiac disease.     Colonoscopy: 4/29/2025 with Dr. 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     GI initially consulted for BRBPR, but now noted to have choledocholithiasis on imaging.        #Choledocholithiasis noted now on imaging with elevated LFTs, hyperbilirubinemia (T. bili 2.4) and RUQ pain.  HDS, afebrile.  No current cholangitis.  Not on anticoagulation  # GIB-hematochezia x 1 occurrence seems to have resolved.  GIB likely 2/2 hemorrhoids in the setting of constipation.  #Normocytic anemia - hgb has remained stable past 2 days:  8.5-8.8-8.3-8.4 today  #Compensated cirrhosis with evidence of portal hypertension, splenomegaly.  No further bleeding.  No hepatic cephalopathy.  No ascites.  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 Schulz at 11am  -Spoke with , Lilia, who will leave a message for  on Sunday to schedule transport to [facility].  Schedule pickup from [place] at 8 AM on 7/28.  -Recommend blood cultures x 2 followed by broad-spectrum antibiotic   -rec gen sx consult to discuss cholecystectomy post ERCP  - Monitor  for signs of cholangitis  - Continue Coreg for primary prophylaxis of esophageal varices, if HR remains >60 and BP allows recommend increasing dose to BID   - Repeat EGD 9/11/2025 at 10 A.M. with Dr. Man as already scheduled   - Continue monitoring hemoglobin, transfuse if hemoglobin less than 7  - Continue to monitor for signs of overt GIB  - Continue pantoprazole 40 mg IV/p.o. twice daily  -diet as tolerated: low-salt, soft diet  - Monitor for signs of encephalopathy  - Abstain from alcohol   - Trend LFTs and INR daily   - Avoid constipation   - Recommend high fiber diet  - Recommend MiraLax 1-2 days as daily as needed for constipation- Gas-X PRN (miralax ordered)  - Senna BID ordered  - Continue iron supplement   - Supportive care per medicine team  - Consider pain management consult to evaluate and treat MALS, celiac stenosis   - Follow up with Dr. Man after EGD in September 2025.          Greater than 50 minutes spent on overall visit, patient/family education, all questions answered and verbalized understanding, lab review, orders, coordination of care with multiple members of the medical team at [facility]'s            Plan discussed with Dr. Perdomo.  GI will continue to follow  (Electronically signed byEMMANUEL Hairston on 7/25/2025 at 10:57 AM)          [1]   Current Facility-Administered Medications:     acetaminophen (Tylenol) tablet 650 mg, 650 mg, oral, q4h PRN, 650 mg at 07/25/25 0422 **OR** acetaminophen (Tylenol) oral liquid 650 mg, 650 mg, oral, q4h PRN **OR** acetaminophen (Tylenol) suppository 650 mg, 650 mg, rectal, q4h PRN, CHRISTINA Soto-CNP    carvedilol (Coreg) tablet 6.25 mg, 6.25 mg, oral, Nightly, EMMANUEL Soto, 6.25 mg at 07/24/25 2011    ferrous sulfate 325 mg (65 mg elemental) tablet 1 tablet, 65 mg of elemental iron, oral, Daily, CHRSITINA Soto-CNP, 1 tablet at 07/25/25 0926    folic acid (Folvite) tablet 1 mg, 1 mg, oral,  Daily, EMMANUEL Soto, 1 mg at 07/25/25 0926    hydroxychloroquine (Plaquenil) tablet 200 mg, 200 mg, oral, Daily, EMMANUEL Soto, 200 mg at 07/25/25 0926    lisinopril tablet 30 mg, 30 mg, oral, Daily, EMMANUEL Soto, 30 mg at 07/25/25 0926    ondansetron (Zofran) tablet 4 mg, 4 mg, oral, q8h PRN **OR** ondansetron (Zofran) injection 4 mg, 4 mg, intravenous, q8h PRN, EMMANUEL Soto    pantoprazole (Protonix) injection 40 mg, 40 mg, intravenous, BID, Susana Rodriguez MD, 40 mg at 07/25/25 0926    pregabalin (Lyrica) capsule 150 mg, 150 mg, oral, TID, EMMANUEL Soto, 150 mg at 07/25/25 0926

## 2025-07-25 NOTE — PROGRESS NOTES
Department of Internal Medicine  Gastroenterology  Progress note      Subjective  GI is following for rectal bleeding with history of cirrhosis.  Now noted to have choledocholithiasis on imaging.     Patient reports she has had right upper quadrant pain that still persists with food.  No nausea/vomiting.  Overall HDS, afebrile.  Patient denies any new abdominal distention.  Remains alert and oriented.  Patient reports she had a very small bowel movement today with no further signs of bleeding.  Patient reports flatulence, but not having a good sized bowel movement since Monday, requesting bowel regimen.  Hemoglobin is overall remained stable.    GI initially consulted for rectal bleeding that has seemed to subside on its own.  However, now noted to have choledocholithiasis on imaging with elevated LFTs and RUQ pain.  No current cholangitis.      Discussed plan of care at length with the patient and family at bedside.  All questions answered and verbalized understanding with plan for ERCP for Monday.  Benefits versus risks of ERCP discussed with the patient/family and agreeable to proceed.  Patient denies any anticoagulation at home.    Discussed patient with multiple members of the medical team at [facility] and [facility] including advanced GI, Dr. Schulz, endoscopy team at [facility] and medical team at [facility].  [place] updated to schedule for transport Monday morning.      Current Medication  Current Medications[1]    Past Medical History  Active Ambulatory Problems     Diagnosis Date Noted    Abdominal pain, acute, right upper quadrant 04/07/2023    Age-related nuclear cataract of both eyes 04/07/2023    Alkaline phosphatase elevation 04/07/2023    Allergic reaction 04/07/2023    Angular cheilosis 04/07/2023    Arcus senilis, bilateral 04/07/2023    Costochondritis 04/07/2023    Foot pain 04/07/2023    Foot sprain 04/07/2023    Foreign body of left ear 04/07/2023    GERD (gastroesophageal reflux disease) 04/07/2023  "   Fibromyalgia 04/07/2023    Headache 04/07/2023    Hypertension associated with type 2 diabetes mellitus 04/07/2023    Posterior subcapsular age-related cataract of left eye 04/07/2023    Type 2 diabetes mellitus with circulatory disorder, without long-term current use of insulin 04/07/2023    SLE (systemic lupus erythematosus) (Multi) 04/07/2023    Sore in mouth 04/07/2023    Thrombocytopenia 04/07/2023    Tinea versicolor 04/07/2023    Transaminitis 04/07/2023    Varicose vein of leg 04/07/2023    Varicose veins of lower extremity 04/07/2023    Class 2 severe obesity due to excess calories with serious comorbidity and body mass index (BMI) of 37.0 to 37.9 in adult 04/07/2023    Knee internal derangement 04/07/2023    Knee pain 04/07/2023    Knee sprain 04/07/2023    Annual physical exam 04/24/2023    Iron deficiency anemia 02/21/2025    Acute upper GI bleed 07/07/2025    Cirrhosis of liver without ascites (Multi) 07/14/2025    Secondary esophageal varices with bleeding (Multi) 07/14/2025     Resolved Ambulatory Problems     Diagnosis Date Noted    Vitamin D deficiency 04/07/2023     Past Medical History:   Diagnosis Date    Allergic     Anemia 04/2025    Clotting disorder (Multi) 06/2025    Diabetes mellitus (Multi)     Encounter for examination of eyes and vision without abnormal findings     Heart murmur     Hypertension     Systemic lupus erythematosus, unspecified     Visual impairment        PHYSICAL EXAM  VS: /53   Pulse 60   Temp 36.1 °C (97 °F)   Resp 16   Ht (!) 1.549 m (5' 1\")   Wt 79.2 kg (174 lb 9.7 oz)   SpO2 98%   BMI 32.99 kg/m²  Body mass index is 32.99 kg/m².  Physical Exam  Well-developed, no acute distress, obese, lungs clear, nonlabored breathing, RRR, abdomen soft, obese,  +RUQ tenderness to palpation, no distention, no peripheral edema, awake alert and oriented x 3, clear speech, no asterixis, appropriate mood and behavior    DATA  Recent blood work and relevant radiology and " endoscopic studies were reviewed and discussed with the patient   Results from last 7 days   Lab Units 07/25/25  0631   WBC AUTO x10*3/uL 3.7*   RBC AUTO x10*6/uL 2.85*   HEMOGLOBIN g/dL 8.4*   HEMATOCRIT % 26.2*   MCV fL 92   MCHC g/dL 32.1   RDW % 13.6   PLATELETS AUTO x10*3/uL 67*       Results from last 72 hours   Lab Units 07/25/25  0631   SODIUM mmol/L 140   POTASSIUM mmol/L 4.2   CHLORIDE mmol/L 109*   CO2 mmol/L 27   BUN mg/dL 9   CREATININE mg/dL 0.52   CALCIUM mg/dL 8.3*   PROTEIN TOTAL g/dL 5.4*   BILIRUBIN TOTAL mg/dL 2.4*   ALK PHOS U/L 231*   AST U/L 149*   ALT U/L 82*       Results from last 72 hours   Lab Units 07/24/25  0551   INR  1.2*       Results from last 72 hours   Lab Units 07/23/25  1901   LIPASE U/L 19       RADIOLOGY REVIEW  Ultrasound right upper quadrant 7/20/2025:  IMPRESSION:  While reviewing today's ultrasound images, I have also reviewed both  recent CTs, 7 July 2025 and 23 July 2025      On CT from 7 July 2025, coronal series 502, image 63, I have  annotated with a [place] a calcified stone that, at that time, was in  the cystic duct or gallbladder neck. On CT 7 July 2025, there was no  calcification/stone near the ampulla      On CT from 23 July 2025, coronal series 602, image 71, I have  annotated with a [place] that same 4 mm stone which by that time had  dropped down the CBD all the way to the ampulla, not causing any  appreciable significant change in caliber configuration of the  upstream biliary tree, but I note the hyperbilirubinemia that  developed between the two CTs      Yesterday's CT confirms choledocholithiasis. As is always the case on  ultrasound, the distal CBD/ampulla cannot be visualized due to  shadowing bowel gas, but the CT finding is absolutely certain, not  requiring MRCP two confirmed but may be obtained at clinician's  discretion to assess for additional stones which may not be visible  on CT      Redemonstration of cirrhotic liver      Yesterday's CT with  arterial phase postcontrast imaging was far more  sensitive and specific for any potential hepatocellular carcinoma and  there was no arterial-enhancing liver lesion      Yesterday's CT with portal venous phase postcontrast imaging  confirmed the splenic vein, main and intrahepatic portal veins are  all patent; acute portal venous thrombosis      Yesterday's CT with portal venous phase postcontrast imaging  confirmed all three hepatic veins are patent; no acute hepatic venous  thrombosis      Yesterday's complete CT abdomen and pelvis confirmed absence of  ascites anywhere in the abdomen or pelvis. Still no ascites in the  right upper quadrant on today's ultrasound      Tiny sandlike, calcified gallstones confirmed on yesterday's CT do  not project as well on today's ultrasound. Gallbladder no longer  dilated. The mild gallbladder wall thickening present yesterday and  again today is nonspecific in the absence of gallbladder dilation and  suspected hepatitis    ENDOSCOPIC REVIEW  EGD: 7/8/2025 with Dr. Elizabeth indicated for epigastric pain upper GI bleed, cirrhosis with EV  Impression  Dilation in the distal esophagus with a medium amount of fluid with abnormal mucosa  Three large grade III varices in the middle third of the esophagus and lower third of the esophagus; there was no stigmata of bleeding but given recent bleeding and size and grade of varices decision was made to band. Placed 5 bands successfully, resulting in complete eradication  Mild and friable portal hypertensive gastropathy in the body of the stomach and antrum; performed cold forceps biopsy  The duodenum appeared normal. Performed random biopsy to rule out celiac disease.     EGD: 4/29/2025 with Dr. Man indicated for JESENIA  Impression  2 cm hiatal hernia  Medium varices in the middle third of the esophagus and lower third of the esophagus  Severe and friable portal hypertensive gastropathy in the body of the stomach and antrum; performed cold  forceps biopsy  The duodenum appeared normal. Performed random biopsy to rule out celiac disease.     Colonoscopy: 4/29/2025 with Dr. 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     GI initially consulted for BRBPR, but now noted to have choledocholithiasis on imaging.        #Choledocholithiasis noted now on imaging with elevated LFTs, hyperbilirubinemia (T. bili 2.4) and RUQ pain.  HDS, afebrile.  No current cholangitis.  Not on anticoagulation  # GIB-hematochezia x 1 occurrence seems to have resolved.  GIB likely 2/2 hemorrhoids in the setting of constipation.  #Normocytic anemia - hgb has remained stable past 2 days:  8.5-8.8-8.3-8.4 today  #Compensated cirrhosis with evidence of portal hypertension, splenomegaly.  No further bleeding.  No hepatic cephalopathy.  No ascites.  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 Schulz at 11am  -Spoke with , Lilia, who will leave a message for  on Sunday to schedule transport to [facility].  Schedule pickup from [place] at 8 AM on 7/28.  -Recommend blood cultures x 2 followed by broad-spectrum antibiotic   -rec gen sx consult to discuss cholecystectomy post ERCP  - Monitor  for signs of cholangitis  - Continue Coreg for primary prophylaxis of esophageal varices, if HR remains >60 and BP allows recommend increasing dose to BID   - Repeat EGD 9/11/2025 at 10 A.M. with Dr. Mna as already scheduled   - Continue monitoring hemoglobin, transfuse if hemoglobin less than 7  - Continue to monitor for signs of overt GIB  - Continue pantoprazole 40 mg IV/p.o. twice daily  -diet as tolerated: low-salt, soft diet  - Monitor for signs of encephalopathy  - Abstain from alcohol   - Trend LFTs and INR daily   - Avoid constipation   - Recommend high fiber diet  - Recommend MiraLax 1-2 days as daily as needed for constipation- Gas-X PRN (miralax ordered)  - Senna BID ordered  - Continue iron supplement   - Supportive care per medicine team  - Consider pain management consult to evaluate and treat MALS, celiac stenosis   - Follow up with Dr. Man after EGD in September 2025.          Greater than 50 minutes spent on overall visit, patient/family education, all questions answered and verbalized understanding, lab review, orders, coordination of care with multiple members of the medical team at [facility]'s            Plan discussed with Dr. Perdomo.  GI will continue to follow  (Electronically signed byEMMANUEL Hiarston on 7/25/2025 at 10:57 AM)          [1]   Current Facility-Administered Medications:     acetaminophen (Tylenol) tablet 650 mg, 650 mg, oral, q4h PRN, 650 mg at 07/25/25 0422 **OR** acetaminophen (Tylenol) oral liquid 650 mg, 650 mg, oral, q4h PRN **OR** acetaminophen (Tylenol) suppository 650 mg, 650 mg, rectal, q4h PRN, CHRISTINA Soto-CNP    carvedilol (Coreg) tablet 6.25 mg, 6.25 mg, oral, Nightly, EMMANUEL Soto, 6.25 mg at 07/24/25 2011    ferrous sulfate 325 mg (65 mg elemental) tablet 1 tablet, 65 mg of elemental iron, oral, Daily, CHRISTINA Soto-CNP, 1 tablet at 07/25/25 0926    folic acid (Folvite) tablet 1 mg, 1 mg, oral,  Daily, EMMANUEL Soto, 1 mg at 07/25/25 0926    hydroxychloroquine (Plaquenil) tablet 200 mg, 200 mg, oral, Daily, EMMANUEL Soto, 200 mg at 07/25/25 0926    lisinopril tablet 30 mg, 30 mg, oral, Daily, EMMANUEL Soto, 30 mg at 07/25/25 0926    ondansetron (Zofran) tablet 4 mg, 4 mg, oral, q8h PRN **OR** ondansetron (Zofran) injection 4 mg, 4 mg, intravenous, q8h PRN, EMMANUEL Soto    pantoprazole (Protonix) injection 40 mg, 40 mg, intravenous, BID, Susana Rodriguez MD, 40 mg at 07/25/25 0926    pregabalin (Lyrica) capsule 150 mg, 150 mg, oral, TID, EMMANUEL Soto, 150 mg at 07/25/25 0926

## 2025-07-25 NOTE — CARE PLAN
The patient's goals for the shift include pain management.     The clinical goals for the shift include patient to remain free from pain throughout shift.      Problem: Pain - Adult  Goal: Verbalizes/displays adequate comfort level or baseline comfort level  Outcome: Progressing     Problem: Safety - Adult  Goal: Free from fall injury  Outcome: Progressing

## 2025-07-25 NOTE — PROGRESS NOTES
"Daily Progress Note    Daylin Tyler is a 65 y.o. female on day 0 of admission presenting with GI bleed.    Subjective   Patient seen and examined, resting comfortably in bed while eating lunch.  Family at bedside.  Patient states she feels well today, denies abdominal pain, nausea, vomiting, diarrhea, CP, SOB.  Spoke with GI via messaging, recommending ERCP on Monday and starting IV Zosyn.  Will also obtain blood cultures.       Objective   Physical Exam  Constitutional:       Appearance: Normal appearance. She is obese.   HENT:      Head: Normocephalic.      Mouth/Throat:      Mouth: Mucous membranes are moist.     Eyes:      Pupils: Pupils are equal, round, and reactive to light.       Cardiovascular:      Rate and Rhythm: Normal rate and regular rhythm.      Heart sounds: Normal heart sounds, S1 normal and S2 normal.   Pulmonary:      Effort: Pulmonary effort is normal.      Breath sounds: Normal breath sounds.   Abdominal:      General: Bowel sounds are normal.      Palpations: Abdomen is soft.      Tenderness: There is no abdominal tenderness.     Musculoskeletal:         General: Normal range of motion.      Cervical back: Neck supple.     Skin:     General: Skin is warm.     Neurological:      Mental Status: She is alert and oriented to person, place, and time.      Motor: No weakness.     Psychiatric:         Mood and Affect: Mood normal.         Behavior: Behavior normal.         Last Recorded Vitals  Blood pressure 117/54, pulse 60, temperature 36.7 °C (98.1 °F), resp. rate 16, height (!) 1.549 m (5' 1\"), weight 79.2 kg (174 lb 9.7 oz), SpO2 98%.  Intake/Output last 3 Shifts:  I/O last 3 completed shifts:  In: 547.5 (6.9 mL/kg) [I.V.:47.5 (0.6 mL/kg); IV Piggyback:500]  Out: - (0 mL/kg)   Weight: 79.2 kg     Medications  Scheduled medications  Scheduled Medications[1]  Continuous medications  Continuous Medications[2]  PRN medications  PRN Medications[3]    Labs  CBC:   Results from last 7 days   Lab Units " 07/25/25  0631 07/24/25  0551 07/23/25  2039   WBC AUTO x10*3/uL 3.7* 3.7* 4.4   RBC AUTO x10*6/uL 2.85* 2.82* 2.96*   HEMOGLOBIN g/dL 8.4* 8.3* 8.8*   HEMATOCRIT % 26.2* 26.0* 27.4*   MCV fL 92 92 93   MCH pg 29.5 29.4 29.7   MCHC g/dL 32.1 31.9* 32.1   RDW % 13.6 14.1 14.0   PLATELETS AUTO x10*3/uL 67* 67* 77*     CMP:    Results from last 7 days   Lab Units 07/25/25  0631 07/24/25  0551 07/23/25  1901   SODIUM mmol/L 140 141 139   POTASSIUM mmol/L 4.2 3.8 3.9   CHLORIDE mmol/L 109* 108* 107   CO2 mmol/L 27 27 25   BUN mg/dL 9 8 10   CREATININE mg/dL 0.52 0.54 0.55   GLUCOSE mg/dL 134* 103* 129*   PROTEIN TOTAL g/dL 5.4* 5.4* 5.7*   CALCIUM mg/dL 8.3* 8.6 8.6   BILIRUBIN TOTAL mg/dL 2.4* 3.0* 2.0*   ALK PHOS U/L 231* 208* 193*   AST U/L 149* 139* 108*   ALT U/L 82* 67* 50*     BMP:    Results from last 7 days   Lab Units 07/25/25  0631 07/24/25  0551 07/23/25  1901   SODIUM mmol/L 140 141 139   POTASSIUM mmol/L 4.2 3.8 3.9   CHLORIDE mmol/L 109* 108* 107   CO2 mmol/L 27 27 25   BUN mg/dL 9 8 10   CREATININE mg/dL 0.52 0.54 0.55   CALCIUM mg/dL 8.3* 8.6 8.6   GLUCOSE mg/dL 134* 103* 129*     Magnesium:  Results from last 7 days   Lab Units 07/25/25  0631 07/24/25  0551 07/23/25  1901   MAGNESIUM mg/dL 1.73 2.03 1.59*     Troponin:    Results from last 7 days   Lab Units 07/23/25  1901   TROPHS ng/L 4     BNP:     Lipid Panel:  Results from last 7 days   Lab Units 07/24/25  0551 07/23/25  1901   INR  1.2* 1.3*   PROTIME seconds 12.8* 14.1*        Nutrition             Relevant Results  Results from last 7 days   Lab Units 07/25/25  1130 07/25/25  0631 07/25/25  0616 07/24/25  1952 07/24/25  1555 07/24/25  1108 07/24/25  0628 07/24/25  0551 07/23/25  1901   POCT GLUCOSE mg/dL 163*  --  144* 156* 81 107*   < >  --   --    GLUCOSE mg/dL  --  134*  --   --   --   --   --  103* 129*    < > = values in this interval not displayed.     Lab Results   Component Value Date    HGBA1C 6.3 03/13/2025         Assessment/Plan    BRBPR, likely secondary to hemorrhoids  Compensated cirrhosis with portal venous hypertension  Splenomegaly  Celiac artery stenosis  Iron deficiency anemia  Constipation  -CT A/P without evidence of GI bleed, notes short segment stenosis of the celiac artery ostium concerning for median arcuate ligament syndrome.  Also notes moderate cirrhotic changes with portal vein hypertension, splenomegaly, and diverticulosis without diverticulitis  - EGD 7/2025 showed 3 large grade 3 esophageal varices, banded x 5 with complete eradication  - GI consult, plan for ERCP on Monday at [facility], no endoscopic studies needed  - Per GI, continue Coreg for primary prophylaxis of esophageal varices  - Advance diet as tolerated  - PPI twice daily  - Trend LFTs and INR daily  - Encourage alcohol cessation/abstinence  - Continue iron supplement  - Optimize pain control  - Bowel regimen with MiraLAX and senna  - RUQ US shows cirrhotic liver, acute portal and hepatic venous thrombosis  - Consider pain management consult per GI for MALS and celiac stenosis  - Will consult ACS for possibility of cholecystectomy after ERCP     Thrombocytopenia  HTN  SLE, not in acute flare  - Thrombocytopenia appears stable, will trend with daily labs  - Continue home antihypertensives  - Continue home Plaquenil     T2DM not requiring insulin  - Hold home Rybelsus  - SSI, Accu-Cheks, diabetic diet  - Blood glucose appears stable on morning labs        DVTp: Defer  PLAN: Home    Lupe Beltran PA-C    Plan of care was discussed extensively with patient.  Patient verbalized understanding through teach back method.  All question and concerns addressed upon examination.    Of note, this documentation is completed using the Dragon Dictation system (voice recognition software). There may be spelling and/or grammatical errors that were not corrected prior to final submission.             [1] carvedilol, 6.25 mg, oral, Nightly  ferrous sulfate, 65  mg of elemental iron, oral, Daily  folic acid, 1 mg, oral, Daily  hydroxychloroquine, 200 mg, oral, Daily  insulin lispro, 0-15 Units, subcutaneous, TID AC  lisinopril, 30 mg, oral, Daily  pantoprazole, 40 mg, intravenous, BID  piperacillin-tazobactam, 3.375 g, intravenous, Once  piperacillin-tazobactam, 3.375 g, intravenous, q8h  polyethylene glycol, 17 g, oral, Daily  pregabalin, 150 mg, oral, TID  sennosides-docusate sodium, 1 tablet, oral, BID  [2]    [3] PRN medications: acetaminophen **OR** acetaminophen **OR** acetaminophen, dextrose, dextrose, glucagon, glucagon, ondansetron **OR** ondansetron

## 2025-07-25 NOTE — PROGRESS NOTES
07/25/25 0964   Discharge Planning   Living Arrangements Spouse/significant other   Support Systems Spouse/significant other   Type of Residence Private residence   Who is requesting discharge planning? Provider   Expected Discharge Disposition Home   Does the patient need discharge transport arranged? No   Financial Resource Strain   How hard is it for you to pay for the very basics like food, housing, medical care, and heating? Not hard   Housing Stability   In the last 12 months, was there a time when you were not able to pay the mortgage or rent on time? N   At any time in the past 12 months, were you homeless or living in a shelter (including now)? N   Transportation Needs   In the past 12 months, has lack of transportation kept you from medical appointments or from getting medications? no   In the past 12 months, has lack of transportation kept you from meetings, work, or from getting things needed for daily living? No   Intensity of Service   Intensity of Service 0-30 min     Met with pt to discuss dc planning. Pt in OBS for GI Bleed. Denies dc needs. Independent with ADL's & I ADL's . Working & drives. Denies financial concerns. Verified PCP. Plan foe dc home, no dc needs identified.

## 2025-07-25 NOTE — CONSULTS
Inpatient consult to Acute Care Surgery  Consult performed by: CHRISTINA Mcgarry-CNP  Consult ordered by: Lupe Beltran PA-C  Reason for consult: Cholecystectomy after ERCP             General Surgery Consult Note    Patient: Daylin Tyler  Unit/Bed: 1109/1109-A  YOB: 1960  MRN: [id number]  Acct: [id number]   Admitting Diagnosis: Epigastric abdominal pain [R10.13]  GI bleed [K92.2]  Elevated transaminase level [R74.01]  History of esophageal varices with bleeding [Z87.19]  Cirrhosis of liver without ascites, unspecified hepatic cirrhosis type (Multi) [K74.60]  Lesion of adrenal gland (Multi) [E27.9]  Date:  7/23/2025  Hospital Day: 0  Attending: Susana Rodriguez MD    Complaint:  Chief Complaint   Patient presents with    Abdominal Cramping     Abdominal cramping for a very long time.       History of Present Illness:  Patient is a 65 year old female with a past medical history of DM2, GERD, HTN, SLE, cirrhosis with esophageal varices s/p EGD approximately 2 weeks ago who presented to the emergency room due to abdominal pain for the past 2 days along with a bloody bowel movement per patient. Patient does take an iron supplement. CT angio AP showed the gallbladder is fluid-filled with cholelithiasis, without gallbladder wall thickening but demonstrates pericholecystic fluid. This is likely the sequela from underlying cirrhosis in which general surgery was consulted for cholecystectomy.    Allergies:  RX Allergies[1]   PMHx:  Medical History[2]  PSHx:  Surgical History[3]  Social Hx:  Social History[4]  Family Hx:  Family History[5]    Review of Systems:   Review of Systems   Gastrointestinal:  Positive for abdominal pain and blood in stool.   All other systems reviewed and are negative.    Physical Examination:    Visit Vitals  /54   Pulse 60   Temp 36.7 °C (98.1 °F)   Resp 16      Physical Exam  Vitals reviewed.   Constitutional:       General: She is not in acute distress.     Appearance:  Normal appearance.   HENT:      Head: Normocephalic.      Nose: Nose normal.      Mouth/Throat:      Mouth: Mucous membranes are moist.     Cardiovascular:      Rate and Rhythm: Normal rate.   Pulmonary:      Effort: Pulmonary effort is normal.   Abdominal:      General: Abdomen is flat. Bowel sounds are normal.      Palpations: Abdomen is soft.      Tenderness: There is abdominal tenderness in the right upper quadrant and epigastric area.     Skin:     General: Skin is warm.      Capillary Refill: Capillary refill takes less than 2 seconds.     Neurological:      General: No focal deficit present.      Mental Status: She is alert and oriented to person, place, and time.     Psychiatric:         Mood and Affect: Mood normal.       LABS:  CBC:   Lab Results   Component Value Date    WBC 3.7 (L) 07/25/2025    RBC 2.85 (L) 07/25/2025    HGB 8.4 (L) 07/25/2025    HCT 26.2 (L) 07/25/2025    MCV 92 07/25/2025    MCH 29.5 07/25/2025    MCHC 32.1 07/25/2025    RDW 13.6 07/25/2025    PLT 67 (L) 07/25/2025     CBC with Differential:    Lab Results   Component Value Date    WBC 3.7 (L) 07/25/2025    RBC 2.85 (L) 07/25/2025    HGB 8.4 (L) 07/25/2025    HCT 26.2 (L) 07/25/2025    PLT 67 (L) 07/25/2025    MCV 92 07/25/2025    MCH 29.5 07/25/2025    MCHC 32.1 07/25/2025    RDW 13.6 07/25/2025    NRBC 0.0 07/25/2025    LYMPHOPCT 13.7 07/25/2025    MONOPCT 9.4 07/25/2025    EOSPCT 6.5 07/25/2025    BASOPCT 0.8 07/25/2025    MONOSABS 0.35 07/25/2025    LYMPHSABS 0.51 (L) 07/25/2025    EOSABS 0.24 07/25/2025    BASOSABS 0.03 07/25/2025     CMP:    Lab Results   Component Value Date     07/25/2025    K 4.2 07/25/2025     (H) 07/25/2025    CO2 27 07/25/2025    BUN 9 07/25/2025    CREATININE 0.52 07/25/2025    GLUCOSE 134 (H) 07/25/2025    PROT 5.4 (L) 07/25/2025    CALCIUM 8.3 (L) 07/25/2025    BILITOT 2.4 (H) 07/25/2025    ALKPHOS 231 (H) 07/25/2025     (H) 07/25/2025    ALT 82 (H) 07/25/2025     BMP:    Lab Results  "  Component Value Date     07/25/2025    K 4.2 07/25/2025     (H) 07/25/2025    CO2 27 07/25/2025    BUN 9 07/25/2025    CREATININE 0.52 07/25/2025    CALCIUM 8.3 (L) 07/25/2025    GLUCOSE 134 (H) 07/25/2025     Magnesium:  Lab Results   Component Value Date    MG 1.73 07/25/2025     Troponin:    Lab Results   Component Value Date    TROPHS 4 07/23/2025     Lipid Panel:  No results found for: \"HDL\", \"CHHDL\", \"VLDL\", \"TRIG\", \"NHDL\"   Current Medications:  Current Medications[6]  US right upper quadrant  Result Date: 7/24/2025  Interpreted By:  Mukund Nunez, STUDY: US RIGHT UPPER QUADRANT;  7/24/2025 9:05 am   INDICATION: Signs/Symptoms:eval liver cirrhosis.     COMPARISON: CTA abdomen and pelvis 23 July 2025   ACCESSION NUMBER(S): [id number]   ORDERING CLINICIAN: CHAUNCEY HOOKS   TECHNIQUE: Transverse and longitudinal grayscale and color Doppler ultrasound of the right upper quadrant, including the liver, biliary system and pancreas, and including limited views of the right kidney   FINDINGS: LIVER: Contour:  Nodular/cirrhotic Echogenicity and Echotexture:  Not abnormally echogenic (not fatty), but abnormally coarsened echotexture, a nonspecific sign suggesting underlying hepatocellular disease Size (craniocaudal long axis, in cm): 13.8, nonenlarged but rather more suspect for shrunken due to cirrhosis Other:  No compelling sonographic evidence for solid hepatic mass   BILE DUCTS: There is no intra- or proximal / perihilar extrahepatic biliary ductal dilation. Common duct diameter: 5-6 mm.   GALLBLADDER: Shadowing gallstones: Tiny sand like stones confirmed on CT do not project as well here. Sludge is present Other:  Wall thickening is nonspecific in the absence of gallbladder dilation and with suspected hepatitis   PANCREAS: Limited views of portions of the head and/or proximal body of the gland are unremarkable, without duct dilation; other portions of the gland are obscured by overlying, shadowing " bowel gas   RIGHT KIDNEY: Size (craniocaudal long axis, in cm): 11.0 Hydronephrosis: Negative Shadowing stone: Negative Other acute unexpected finding/s: Negative   OTHER: Right upper quadrant ascites: Negative       While reviewing today's ultrasound images, I have also reviewed both recent CTs, 7 July 2025 and 23 July 2025   On CT from 7 July 2025, coronal series 502, image 63, I have annotated with a Apache a calcified stone that, at that time, was in the cystic duct or gallbladder neck. On CT 7 July 2025, there was no calcification/stone near the ampulla   On CT from 23 July 2025, coronal series 602, image 71, I have annotated with a Apache that same 4 mm stone which by that time had dropped down the CBD all the way to the ampulla, not causing any appreciable significant change in caliber configuration of the upstream biliary tree, but I note the hyperbilirubinemia that developed between the two CTs   Yesterday's CT confirms choledocholithiasis. As is always the case on ultrasound, the distal CBD/ampulla cannot be visualized due to shadowing bowel gas, but the CT finding is absolutely certain, not requiring MRCP two confirmed but may be obtained at clinician's discretion to assess for additional stones which may not be visible on CT   Redemonstration of cirrhotic liver   Yesterday's CT with arterial phase postcontrast imaging was far more sensitive and specific for any potential hepatocellular carcinoma and there was no arterial-enhancing liver lesion   Yesterday's CT with portal venous phase postcontrast imaging confirmed the splenic vein, main and intrahepatic portal veins are all patent; acute portal venous thrombosis   Yesterday's CT with portal venous phase postcontrast imaging confirmed all three hepatic veins are patent; no acute hepatic venous thrombosis   Yesterday's complete CT abdomen and pelvis confirmed absence of ascites anywhere in the abdomen or pelvis. Still no ascites in the right upper  quadrant on today's ultrasound   Tiny sandlike, calcified gallstones confirmed on yesterday's CT do not project as well on today's ultrasound. Gallbladder no longer dilated. The mild gallbladder wall thickening present yesterday and again today is nonspecific in the absence of gallbladder dilation and suspected hepatitis   MACRO: None   Signed by: Mukund Nunez 7/24/2025 10:07 AM Dictation workstation:   [id number]    CT angio abdomen pelvis w and or wo IV IV contrast  Result Date: 7/23/2025  STUDY: CT CTA ABDOMEN and PELVIS w + wo CONTRAST; 7/23/2025 8:22 PM INDICATION:  Active GI bleed (0.3-0.5 cc blood/minute in conjunction with GI or intensivist consult. TECHNIQUE:  Helical CT is performed from the aortic diaphragmatic hiatus through the symphysis pubis before and after bolus administration of 75 mL of Omnipaque 350.  Images were reviewed and processed at a workstation according to the CT angiogram protocol with 3-D and/or MIP post processing imaging generated.  Automated mA/kV exposure control was utilized and patient examination was performed in strict accordance with principles of ALARA. COMPARISON:  CTA abdomen and pelvis 7/7/2025, US abdomen 4/10/2025. FINDINGS: Vascular: Abdominal Aorta: The abdominal aorta is of appropriate caliber and demonstrates no area of stenoses or significant atherosclerotic disease. There is no aneurysm of the abdominal aorta. Celiac artery: Short segment high-grade narrowing with associated angulation as well as poststenotic dilatation centered at the ostium. Conventional hepatic arterial anatomy present Superior mesenteric artery: Patent without stenosis Inferior mesenteric artery: Patent without stenosis Right renal artery: Single-vessel, patent without stenosis Left renal artery: Three (3) vessels, patent without stenosis, Right Run-off: Aortoiliac: The common, external, and internal iliac vessels demonstrate no aneurysm or significant stenosis.  Femoropopliteal: The proximal  femoropopliteal system demonstrates no significant stenosis Left run-off: Aortoiliac: The common, external, and internal iliac vessels demonstrate no aneurysm or significant stenosis.  Femoropopliteal: The proximal femoropopliteal system demonstrates no significant stenosis Inferior vena cava: No acute pathology Portal veins: No acute pathology. Splanchnic veins: A splenorenal shunt is identified. Abdomen/Pelvis: Lower thorax: No acute pathology. Hepatobiliary: Diffuse nodularity of the hepatic parenchymal surface with enlargement of the caudate lobe.  Findings consistent with underlying cirrhosis.  There is recannulation of the umbilical vein. The gallbladder is fluid-filled with cholelithiasis, without gallbladder wall thickening but demonstrates pericholecystic fluid. This is likely the sequela from underlying cirrhosis. Spleen: The spleen is enlarged measuring 16.1 cm in craniocaudal dimension. Pancreas: No acute pathology. Adrenals: There is a 1.4 cm isoattenuating nodule in the main body of the right adrenal.  No acute pathology of the left adrenal. Kidneys/Urinary bladder: No acute pathology of the kidneys or urinary bladder. Gastrointestinal tract: No acute pathology.  There is no evidence suggestive of upper or lower gastrointestinal hemorrhage from an arterial source.  There is diffuse calcified and noncalcified diverticulosis along the sigmoid and descending colon without evidence of diverticulitis.  Small hiatal hernia appreciated. Lymph nodes: No acute pathology. Mesentery/Peritoneum: No acute pathology. Pelvis/Reproductive Organs: No acute pathology. Osseous/Miscellaneous: No acute osseous pathology.  Significant intervertebral disc narrowing with circumferential disc bulge and osteophyte formation at L5-S1.    Vascular: *No evidence of upper or lower gastrointestinal hemorrhage from an arterial source. *Short segment stenosis with angulation and poststenotic dilatation centered at the celiac artery  ostium.  Findings raise the possibility for median arcuate ligament syndrome in the appropriate clinical setting. Abdomen/pelvis: *Moderate cirrhotic changes with evidence of portal venous hypertension. *Splenomegaly *1.4 cm isoattenuating lesion in the main body of the RIGHT adrenal.  *Extensive sigmoid and colonic diverticulosis without diverticulitis Signed by Jackie Oscar MD       Assessment:    Cholecystectomy after ERCP    Patient denies nausea and vomiting. Patient denies abdominal tenderness. Patient states she is passing gas.     On exam abdomen is soft, non distended and tenderness with palpation to RUQ and epigastric area. Bowel sounds present x4 quadrant. RUQ US showed tiny sandlike, calcified gallstones confirmed on yesterday's CT do not project as well on today's ultrasound. Gallbladder no longer dilated. The mild gallbladder wall thickening present yesterday and again today is nonspecific in the absence of gallbladder dilation and suspected hepatitis. Labs show elevated LFTs. Afebrile.     Plan:  -Continue diet as tolerated  -Will await ERCP results to determine next course of action.       Further recommendations per Dr. Flores     Time spent  60  minutes obtaining labs, imaging, recommendations, interview, assessment, examination, medication review/ordering, and EMR review.    Plan of care was discussed extensively with patient. Patient verbalized understanding through teach back method. All questions and concerns addressed upon examination.     Of note, this documentation is completed using the Dragon Dictation system (voice recognition software). There may be spelling and/or grammatical errors that were not corrected prior to final submission.    Electronically signed by EMMANUEL Mcgarry on 7/25/2025 at 3:21 PM          [1]   Allergies  Allergen Reactions    Metformin GI Upset     Side effects stopped when medication was discontinued    Sulfamethoxazole Itching and Swelling   [2]   Past  Medical History:  Diagnosis Date    Allergic     Anemia 2025    Clotting disorder (Multi) 2025    Diabetes mellitus (Multi)     Encounter for examination of eyes and vision without abnormal findings     Encounter for eye exam    GERD (gastroesophageal reflux disease)     Headache     Heart murmur     Hypertension     Systemic lupus erythematosus, unspecified     History of systemic lupus erythematosus (SLE)    Visual impairment    [3]   Past Surgical History:  Procedure Laterality Date     SECTION, CLASSIC       section     SECTION, LOW TRANSVERSE      ENDOMETRIAL ABLATION      Ablation    EYE SURGERY      Eye surgery    TONSILLECTOMY     [4]   Social History  Socioeconomic History    Marital status:    Tobacco Use    Smoking status: Never    Smokeless tobacco: Never   Vaping Use    Vaping status: Never Used   Substance and Sexual Activity    Alcohol use: Yes     Alcohol/week: 0.0 - 1.0 standard drinks of alcohol    Drug use: Never    Sexual activity: Not Currently     Social Drivers of Health     Financial Resource Strain: Low Risk  (2025)    Overall Financial Resource Strain (CARDIA)     Difficulty of Paying Living Expenses: Not hard at all   Food Insecurity: No Food Insecurity (2025)    Hunger Vital Sign     Worried About Running Out of Food in the Last Year: Never true     Ran Out of Food in the Last Year: Never true   Transportation Needs: No Transportation Needs (2025)    PRAPARE - Transportation     Lack of Transportation (Medical): No     Lack of Transportation (Non-Medical): No   Intimate Partner Violence: Not At Risk (2025)    Humiliation, Afraid, Rape, and Kick questionnaire     Fear of Current or Ex-Partner: No     Emotionally Abused: No     Physically Abused: No     Sexually Abused: No   Housing Stability: Unknown (2025)    Housing Stability Vital Sign     Unable to Pay for Housing in the Last Year: No     Homeless in the Last Year: No   [5]    Family History  Problem Relation Name Age of Onset    Glaucoma Mother      Hypertension Mother      Diabetes Father      Glaucoma Father      Other (sarcoma) Father      Diabetes Son Bandar Rivera    [6]   Current Facility-Administered Medications:     acetaminophen (Tylenol) tablet 650 mg, 650 mg, oral, q4h PRN, 650 mg at 07/25/25 0422 **OR** acetaminophen (Tylenol) oral liquid 650 mg, 650 mg, oral, q4h PRN **OR** acetaminophen (Tylenol) suppository 650 mg, 650 mg, rectal, q4h PRN, EMMANUEL Soto    carvedilol (Coreg) tablet 6.25 mg, 6.25 mg, oral, Nightly, EMMANUEL Soto, 6.25 mg at 07/24/25 2011    dextrose 50 % injection 12.5 g, 12.5 g, intravenous, q15 min PRN, Lupe Beltran PA-C    dextrose 50 % injection 25 g, 25 g, intravenous, q15 min PRN, Lupe Beltran PA-C    ferrous sulfate 325 mg (65 mg elemental) tablet 1 tablet, 65 mg of elemental iron, oral, Daily, EMMANUEL Soto, 1 tablet at 07/25/25 0926    folic acid (Folvite) tablet 1 mg, 1 mg, oral, Daily, EMMANUEL Soto, 1 mg at 07/25/25 0926    glucagon (Glucagen) injection 1 mg, 1 mg, intramuscular, q15 min PRN, Lupe Beltran PA-C    glucagon (Glucagen) injection 1 mg, 1 mg, intramuscular, q15 min PRN, Lupe Beltran PA-C    hydroxychloroquine (Plaquenil) tablet 200 mg, 200 mg, oral, Daily, EMMANUEL Soto, 200 mg at 07/25/25 0926    insulin lispro injection 0-15 Units, 0-15 Units, subcutaneous, TID AC, Lupe Beltran PA-C, 3 Units at 07/25/25 1236    lisinopril tablet 30 mg, 30 mg, oral, Daily, EMMANUEL Soto, 30 mg at 07/25/25 0926    ondansetron (Zofran) tablet 4 mg, 4 mg, oral, q8h PRN **OR** ondansetron (Zofran) injection 4 mg, 4 mg, intravenous, q8h PRN, CHRISTINA Soto-CNP    pantoprazole (Protonix) injection 40 mg, 40 mg, intravenous, BID, Susana Rodriguez MD, 40 mg at 07/25/25 0926    piperacillin-tazobactam (Zosyn) 3.375 g in dextrose (iso) IV 50 mL, 3.375 g,  intravenous, Once, Lupe Beltran PA-C    piperacillin-tazobactam (Zosyn) 3.375 g in dextrose (iso) IV 50 mL, 3.375 g, intravenous, q8h, Lupe Beltran PA-C    polyethylene glycol (Glycolax, Miralax) packet 17 g, 17 g, oral, Daily, EMMANUEL Ochoa    pregabalin (Lyrica) capsule 150 mg, 150 mg, oral, TID, EMMANUEL Soto, 150 mg at 07/25/25 1400    sennosides-docusate sodium (Geraldine-Colace) 8.6-50 mg per tablet 1 tablet, 1 tablet, oral, BID, EMMANUEL Ochoa, 1 tablet at 07/25/25 1236

## 2025-07-26 LAB
ALBUMIN SERPL BCP-MCNC: 3 G/DL (ref 3.4–5)
ALP SERPL-CCNC: 216 U/L (ref 33–136)
ALT SERPL W P-5'-P-CCNC: 89 U/L (ref 7–45)
ANION GAP SERPL CALC-SCNC: 9 MMOL/L (ref 10–20)
AST SERPL W P-5'-P-CCNC: 145 U/L (ref 9–39)
BASOPHILS # BLD AUTO: 0.02 X10*3/UL (ref 0–0.1)
BASOPHILS NFR BLD AUTO: 0.7 %
BILIRUB SERPL-MCNC: 1.3 MG/DL (ref 0–1.2)
BUN SERPL-MCNC: 10 MG/DL (ref 6–23)
CALCIUM SERPL-MCNC: 8 MG/DL (ref 8.6–10.3)
CHLORIDE SERPL-SCNC: 109 MMOL/L (ref 98–107)
CO2 SERPL-SCNC: 26 MMOL/L (ref 21–32)
CREAT SERPL-MCNC: 0.62 MG/DL (ref 0.5–1.05)
EGFRCR SERPLBLD CKD-EPI 2021: >90 ML/MIN/1.73M*2
EOSINOPHIL # BLD AUTO: 0.18 X10*3/UL (ref 0–0.7)
EOSINOPHIL NFR BLD AUTO: 6.2 %
ERYTHROCYTE [DISTWIDTH] IN BLOOD BY AUTOMATED COUNT: 13.8 % (ref 11.5–14.5)
EST. AVERAGE GLUCOSE BLD GHB EST-MCNC: 105 MG/DL
GLUCOSE BLD MANUAL STRIP-MCNC: 125 MG/DL (ref 74–99)
GLUCOSE BLD MANUAL STRIP-MCNC: 131 MG/DL (ref 74–99)
GLUCOSE BLD MANUAL STRIP-MCNC: 144 MG/DL (ref 74–99)
GLUCOSE BLD MANUAL STRIP-MCNC: 189 MG/DL (ref 74–99)
GLUCOSE SERPL-MCNC: 158 MG/DL (ref 74–99)
HBA1C MFR BLD: 5.3 % (ref ?–5.7)
HCT VFR BLD AUTO: 24.8 % (ref 36–46)
HGB BLD-MCNC: 8.2 G/DL (ref 12–16)
IMM GRANULOCYTES # BLD AUTO: 0.01 X10*3/UL (ref 0–0.7)
IMM GRANULOCYTES NFR BLD AUTO: 0.3 % (ref 0–0.9)
LYMPHOCYTES # BLD AUTO: 0.51 X10*3/UL (ref 1.2–4.8)
LYMPHOCYTES NFR BLD AUTO: 17.5 %
MAGNESIUM SERPL-MCNC: 1.65 MG/DL (ref 1.6–2.4)
MCH RBC QN AUTO: 29.6 PG (ref 26–34)
MCHC RBC AUTO-ENTMCNC: 33.1 G/DL (ref 32–36)
MCV RBC AUTO: 90 FL (ref 80–100)
MONOCYTES # BLD AUTO: 0.3 X10*3/UL (ref 0.1–1)
MONOCYTES NFR BLD AUTO: 10.3 %
NEUTROPHILS # BLD AUTO: 1.9 X10*3/UL (ref 1.2–7.7)
NEUTROPHILS NFR BLD AUTO: 65 %
NRBC BLD-RTO: 0 /100 WBCS (ref 0–0)
PLATELET # BLD AUTO: 72 X10*3/UL (ref 150–450)
POTASSIUM SERPL-SCNC: 3.9 MMOL/L (ref 3.5–5.3)
PROT SERPL-MCNC: 5.2 G/DL (ref 6.4–8.2)
RBC # BLD AUTO: 2.77 X10*6/UL (ref 4–5.2)
SODIUM SERPL-SCNC: 140 MMOL/L (ref 136–145)
WBC # BLD AUTO: 2.9 X10*3/UL (ref 4.4–11.3)

## 2025-07-26 PROCEDURE — 82947 ASSAY GLUCOSE BLOOD QUANT: CPT

## 2025-07-26 PROCEDURE — 85025 COMPLETE CBC W/AUTO DIFF WBC: CPT

## 2025-07-26 PROCEDURE — 36415 COLL VENOUS BLD VENIPUNCTURE: CPT

## 2025-07-26 PROCEDURE — 83735 ASSAY OF MAGNESIUM: CPT

## 2025-07-26 PROCEDURE — 1200000002 HC GENERAL ROOM WITH TELEMETRY DAILY

## 2025-07-26 PROCEDURE — 2500000004 HC RX 250 GENERAL PHARMACY W/ HCPCS (ALT 636 FOR OP/ED): Performed by: HOSPITALIST

## 2025-07-26 PROCEDURE — 2500000004 HC RX 250 GENERAL PHARMACY W/ HCPCS (ALT 636 FOR OP/ED)

## 2025-07-26 PROCEDURE — 2500000001 HC RX 250 WO HCPCS SELF ADMINISTERED DRUGS (ALT 637 FOR MEDICARE OP): Performed by: NURSE PRACTITIONER

## 2025-07-26 PROCEDURE — 2500000004 HC RX 250 GENERAL PHARMACY W/ HCPCS (ALT 636 FOR OP/ED): Performed by: NURSE PRACTITIONER

## 2025-07-26 PROCEDURE — 84075 ASSAY ALKALINE PHOSPHATASE: CPT

## 2025-07-26 PROCEDURE — 83036 HEMOGLOBIN GLYCOSYLATED A1C: CPT | Mod: ELYLAB

## 2025-07-26 PROCEDURE — 99232 SBSQ HOSP IP/OBS MODERATE 35: CPT

## 2025-07-26 RX ADMIN — POLYETHYLENE GLYCOL 3350 17 G: 17 POWDER, FOR SOLUTION ORAL at 09:06

## 2025-07-26 RX ADMIN — FERROUS SULFATE TAB 325 MG (65 MG ELEMENTAL FE) 1 TABLET: 325 (65 FE) TAB at 09:07

## 2025-07-26 RX ADMIN — DOCUSATE SODIUM 50MG AND SENNOSIDES 8.6MG 1 TABLET: 8.6; 5 TABLET, FILM COATED ORAL at 20:28

## 2025-07-26 RX ADMIN — PIPERACILLIN SODIUM AND TAZOBACTAM SODIUM 3.38 G: 3; .375 INJECTION, SOLUTION INTRAVENOUS at 05:29

## 2025-07-26 RX ADMIN — DOCUSATE SODIUM 50MG AND SENNOSIDES 8.6MG 1 TABLET: 8.6; 5 TABLET, FILM COATED ORAL at 09:07

## 2025-07-26 RX ADMIN — HYDROXYCHLOROQUINE SULFATE 200 MG: 200 TABLET, FILM COATED ORAL at 09:07

## 2025-07-26 RX ADMIN — CARVEDILOL 6.25 MG: 6.25 TABLET, FILM COATED ORAL at 20:28

## 2025-07-26 RX ADMIN — PANTOPRAZOLE SODIUM 40 MG: 40 INJECTION, POWDER, FOR SOLUTION INTRAVENOUS at 20:29

## 2025-07-26 RX ADMIN — PREGABALIN 150 MG: 50 CAPSULE ORAL at 20:24

## 2025-07-26 RX ADMIN — PIPERACILLIN SODIUM AND TAZOBACTAM SODIUM 3.38 G: 3; .375 INJECTION, SOLUTION INTRAVENOUS at 11:57

## 2025-07-26 RX ADMIN — PREGABALIN 150 MG: 50 CAPSULE ORAL at 16:22

## 2025-07-26 RX ADMIN — FOLIC ACID 1 MG: 1 TABLET ORAL at 09:07

## 2025-07-26 RX ADMIN — PIPERACILLIN SODIUM AND TAZOBACTAM SODIUM 3.38 G: 3; .375 INJECTION, SOLUTION INTRAVENOUS at 20:29

## 2025-07-26 RX ADMIN — PREGABALIN 150 MG: 50 CAPSULE ORAL at 09:06

## 2025-07-26 RX ADMIN — LISINOPRIL 30 MG: 20 TABLET ORAL at 09:07

## 2025-07-26 RX ADMIN — PANTOPRAZOLE SODIUM 40 MG: 40 INJECTION, POWDER, FOR SOLUTION INTRAVENOUS at 09:07

## 2025-07-26 ASSESSMENT — PAIN SCALES - GENERAL
PAINLEVEL_OUTOF10: 0 - NO PAIN
PAINLEVEL_OUTOF10: 0 - NO PAIN

## 2025-07-26 ASSESSMENT — COGNITIVE AND FUNCTIONAL STATUS - GENERAL
CLIMB 3 TO 5 STEPS WITH RAILING: A LITTLE
MOBILITY SCORE: 23
DAILY ACTIVITIY SCORE: 24

## 2025-07-26 ASSESSMENT — PAIN - FUNCTIONAL ASSESSMENT: PAIN_FUNCTIONAL_ASSESSMENT: 0-10

## 2025-07-26 NOTE — CARE PLAN
The patient's goals for the shift include  Pt will be able to rest/sleep    The clinical goals for the shift include Pt will be free from fall/injury throughout this shift.

## 2025-07-26 NOTE — PROGRESS NOTES
"Daily Progress Note    Daylin Tyler is a 65 y.o. female on day 1 of admission presenting with GI bleed.    Subjective   Patient seen and examined, resting comfortably in bed with  at bedside.  States she feels well today, no acute events overnight.  Denies CP, SOB, abdominal pain, nausea, vomiting.  Did have an episode of diarrhea this morning, likely secondary to initiation of Zosyn.  Denies hematochezia or melena.  Is tolerating food.       Objective   Physical Exam  Constitutional:       Appearance: She is obese.   HENT:      Head: Normocephalic.      Mouth/Throat:      Mouth: Mucous membranes are moist.     Eyes:      Pupils: Pupils are equal, round, and reactive to light.       Cardiovascular:      Rate and Rhythm: Normal rate and regular rhythm.      Heart sounds: Normal heart sounds, S1 normal and S2 normal.   Pulmonary:      Effort: Pulmonary effort is normal.      Breath sounds: Normal breath sounds.   Abdominal:      General: Bowel sounds are normal.      Palpations: Abdomen is soft.      Tenderness: There is no abdominal tenderness.     Musculoskeletal:         General: Normal range of motion.      Cervical back: Neck supple.     Skin:     General: Skin is warm.     Neurological:      Mental Status: She is alert and oriented to person, place, and time.      Motor: No weakness.     Psychiatric:         Mood and Affect: Mood normal.         Behavior: Behavior normal.         Last Recorded Vitals  Blood pressure 123/58, pulse 65, temperature 37 °C (98.6 °F), resp. rate 18, height (!) 1.549 m (5' 1\"), weight 79.2 kg (174 lb 9.7 oz), SpO2 97%.  Intake/Output last 3 Shifts:  I/O last 3 completed shifts:  In: 50 (0.6 mL/kg) [IV Piggyback:50]  Out: - (0 mL/kg)   Weight: 79.2 kg     Medications  Scheduled medications  Scheduled Medications[1]  Continuous medications  Continuous Medications[2]  PRN medications  PRN Medications[3]    Labs  CBC:   Results from last 7 days   Lab Units 07/26/25  0556 " 07/25/25  0631 07/24/25  0551   WBC AUTO x10*3/uL 2.9* 3.7* 3.7*   RBC AUTO x10*6/uL 2.77* 2.85* 2.82*   HEMOGLOBIN g/dL 8.2* 8.4* 8.3*   HEMATOCRIT % 24.8* 26.2* 26.0*   MCV fL 90 92 92   MCH pg 29.6 29.5 29.4   MCHC g/dL 33.1 32.1 31.9*   RDW % 13.8 13.6 14.1   PLATELETS AUTO x10*3/uL 72* 67* 67*     CMP:    Results from last 7 days   Lab Units 07/26/25  0556 07/25/25  0631 07/24/25  0551   SODIUM mmol/L 140 140 141   POTASSIUM mmol/L 3.9 4.2 3.8   CHLORIDE mmol/L 109* 109* 108*   CO2 mmol/L 26 27 27   BUN mg/dL 10 9 8   CREATININE mg/dL 0.62 0.52 0.54   GLUCOSE mg/dL 158* 134* 103*   PROTEIN TOTAL g/dL 5.2* 5.4* 5.4*   CALCIUM mg/dL 8.0* 8.3* 8.6   BILIRUBIN TOTAL mg/dL 1.3* 2.4* 3.0*   ALK PHOS U/L 216* 231* 208*   AST U/L 145* 149* 139*   ALT U/L 89* 82* 67*     BMP:    Results from last 7 days   Lab Units 07/26/25  0556 07/25/25  0631 07/24/25  0551   SODIUM mmol/L 140 140 141   POTASSIUM mmol/L 3.9 4.2 3.8   CHLORIDE mmol/L 109* 109* 108*   CO2 mmol/L 26 27 27   BUN mg/dL 10 9 8   CREATININE mg/dL 0.62 0.52 0.54   CALCIUM mg/dL 8.0* 8.3* 8.6   GLUCOSE mg/dL 158* 134* 103*     Magnesium:  Results from last 7 days   Lab Units 07/26/25  0556 07/25/25  0631 07/24/25  0551   MAGNESIUM mg/dL 1.65 1.73 2.03     Troponin:    Results from last 7 days   Lab Units 07/23/25  1901   TROPHS ng/L 4     BNP:     Lipid Panel:  Results from last 7 days   Lab Units 07/24/25  0551 07/23/25  1901   INR  1.2* 1.3*   PROTIME seconds 12.8* 14.1*        Nutrition             Relevant Results  Results from last 7 days   Lab Units 07/26/25  0639 07/26/25  0556 07/25/25  2122 07/25/25  1635 07/25/25  1130 07/25/25  0631 07/25/25  0616 07/24/25  0628 07/24/25  0551 07/23/25  1901   POCT GLUCOSE mg/dL 144*  --  168* 152* 163*  --  144*   < >  --   --    GLUCOSE mg/dL  --  158*  --   --   --  134*  --   --  103* 129*    < > = values in this interval not displayed.     Lab Results   Component Value Date    HGBA1C 6.3 03/13/2025         Assessment/Plan      BRBPR, likely secondary to hemorrhoids  Compensated cirrhosis with portal venous hypertension  Splenomegaly  Celiac artery stenosis  Iron deficiency anemia  Constipation  - CT A/P without evidence of GI bleed, notes short segment stenosis of the celiac artery ostium concerning for median arcuate ligament syndrome.  Also notes moderate cirrhotic changes with portal vein hypertension, splenomegaly, and diverticulosis without diverticulitis  - EGD 7/2025 showed 3 large grade 3 esophageal varices, banded x 5 with complete eradication  - GI consult, plan for ERCP on Monday at [facility], no endoscopic studies needed  - Continue IV Zosyn per GI recs  - Follow blood cultures  - Continue Coreg for primary prophylaxis of esophageal varices  - PPI twice daily  - Trend LFTs and INR daily  - Encourage alcohol cessation/abstinence  - Continue iron supplement  - Optimize pain control  - Bowel regimen with MiraLAX and Geraldine-Colace  - RUQ US shows cirrhotic liver, acute portal and hepatic venous thrombosis  - ACS consulted, further recs pending ERCP  - Consider pain management consult per GI for MALS and celiac stenosis     Thrombocytopenia  HTN  SLE, not in acute flare  - Thrombocytopenia appears stable, will trend with daily labs  - Continue home antihypertensives  - Continue home Plaquenil     T2DM not requiring insulin  - Hold home Rybelsus  - SSI, Accu-Cheks, diabetic diet  - A1c 3/2025 was 6.3%  - Repeat A1c     DVTp: Defer  PLAN: Home    Lupe Beltran PA-C    Plan of care was discussed extensively with patient.  Patient verbalized understanding through teach back method.  All question and concerns addressed upon examination.    Of note, this documentation is completed using the Dragon Dictation system (voice recognition software). There may be spelling and/or grammatical errors that were not corrected prior to final submission.             [1] carvedilol, 6.25 mg, oral, Nightly  ferrous sulfate, 65 mg of  elemental iron, oral, Daily  folic acid, 1 mg, oral, Daily  hydroxychloroquine, 200 mg, oral, Daily  insulin lispro, 0-15 Units, subcutaneous, TID AC  lisinopril, 30 mg, oral, Daily  pantoprazole, 40 mg, intravenous, BID  piperacillin-tazobactam, 3.375 g, intravenous, q8h  polyethylene glycol, 17 g, oral, Daily  pregabalin, 150 mg, oral, TID  sennosides-docusate sodium, 1 tablet, oral, BID  [2]    [3] PRN medications: acetaminophen **OR** acetaminophen **OR** acetaminophen, dextrose, dextrose, glucagon, glucagon, ondansetron **OR** ondansetron

## 2025-07-26 NOTE — SIGNIFICANT EVENT
She did not go for her ERCP yesterday because she had eaten in the morning.  She denies recurrence of epigastric pain, and fever.  She has been afebrile overnight.  She does have pancytopenia.  She denies a prior history of gastric bypass.  Her INR is 1.3.  Will plan for ERCP on Monday for her choledocholithiasis.  Please keep her n.p.o. after midnight tomorrow.

## 2025-07-26 NOTE — CARE PLAN
The patient's goals for the shift include      The clinical goals for the shift include Patient will remain HDS      Problem: Pain - Adult  Goal: Verbalizes/displays adequate comfort level or baseline comfort level  Outcome: Progressing     Problem: Safety - Adult  Goal: Free from fall injury  Outcome: Progressing     Problem: Nutrition  Goal: Nutrient intake appropriate for maintaining nutritional needs  Outcome: Progressing

## 2025-07-27 VITALS
HEIGHT: 61 IN | DIASTOLIC BLOOD PRESSURE: 51 MMHG | TEMPERATURE: 97.7 F | SYSTOLIC BLOOD PRESSURE: 105 MMHG | HEART RATE: 67 BPM | WEIGHT: 174.6 LBS | BODY MASS INDEX: 32.97 KG/M2 | RESPIRATION RATE: 16 BRPM | OXYGEN SATURATION: 96 %

## 2025-07-27 LAB
ALBUMIN SERPL BCP-MCNC: 3.1 G/DL (ref 3.4–5)
ALP SERPL-CCNC: 206 U/L (ref 33–136)
ALT SERPL W P-5'-P-CCNC: 83 U/L (ref 7–45)
ANION GAP SERPL CALC-SCNC: 8 MMOL/L (ref 10–20)
AST SERPL W P-5'-P-CCNC: 114 U/L (ref 9–39)
BACTERIA BLD CULT: NORMAL
BACTERIA BLD CULT: NORMAL
BASOPHILS # BLD AUTO: 0.02 X10*3/UL (ref 0–0.1)
BASOPHILS NFR BLD AUTO: 0.6 %
BILIRUB SERPL-MCNC: 1.5 MG/DL (ref 0–1.2)
BUN SERPL-MCNC: 9 MG/DL (ref 6–23)
CALCIUM SERPL-MCNC: 8.3 MG/DL (ref 8.6–10.3)
CHLORIDE SERPL-SCNC: 110 MMOL/L (ref 98–107)
CO2 SERPL-SCNC: 26 MMOL/L (ref 21–32)
CREAT SERPL-MCNC: 0.55 MG/DL (ref 0.5–1.05)
EGFRCR SERPLBLD CKD-EPI 2021: >90 ML/MIN/1.73M*2
EOSINOPHIL # BLD AUTO: 0.2 X10*3/UL (ref 0–0.7)
EOSINOPHIL NFR BLD AUTO: 6.2 %
ERYTHROCYTE [DISTWIDTH] IN BLOOD BY AUTOMATED COUNT: 13.6 % (ref 11.5–14.5)
GLUCOSE BLD MANUAL STRIP-MCNC: 112 MG/DL (ref 74–99)
GLUCOSE BLD MANUAL STRIP-MCNC: 133 MG/DL (ref 74–99)
GLUCOSE BLD MANUAL STRIP-MCNC: 162 MG/DL (ref 74–99)
GLUCOSE BLD MANUAL STRIP-MCNC: 226 MG/DL (ref 74–99)
GLUCOSE SERPL-MCNC: 118 MG/DL (ref 74–99)
HCT VFR BLD AUTO: 25.7 % (ref 36–46)
HGB BLD-MCNC: 8.3 G/DL (ref 12–16)
HOLD SPECIMEN: NORMAL
IMM GRANULOCYTES # BLD AUTO: 0.01 X10*3/UL (ref 0–0.7)
IMM GRANULOCYTES NFR BLD AUTO: 0.3 % (ref 0–0.9)
LYMPHOCYTES # BLD AUTO: 0.65 X10*3/UL (ref 1.2–4.8)
LYMPHOCYTES NFR BLD AUTO: 20.2 %
MAGNESIUM SERPL-MCNC: 1.69 MG/DL (ref 1.6–2.4)
MCH RBC QN AUTO: 29.3 PG (ref 26–34)
MCHC RBC AUTO-ENTMCNC: 32.3 G/DL (ref 32–36)
MCV RBC AUTO: 91 FL (ref 80–100)
MONOCYTES # BLD AUTO: 0.31 X10*3/UL (ref 0.1–1)
MONOCYTES NFR BLD AUTO: 9.6 %
NEUTROPHILS # BLD AUTO: 2.03 X10*3/UL (ref 1.2–7.7)
NEUTROPHILS NFR BLD AUTO: 63.1 %
NRBC BLD-RTO: 0 /100 WBCS (ref 0–0)
PLATELET # BLD AUTO: 69 X10*3/UL (ref 150–450)
POTASSIUM SERPL-SCNC: 3.9 MMOL/L (ref 3.5–5.3)
PROT SERPL-MCNC: 5.3 G/DL (ref 6.4–8.2)
RBC # BLD AUTO: 2.83 X10*6/UL (ref 4–5.2)
SODIUM SERPL-SCNC: 140 MMOL/L (ref 136–145)
WBC # BLD AUTO: 3.2 X10*3/UL (ref 4.4–11.3)

## 2025-07-27 PROCEDURE — 2500000001 HC RX 250 WO HCPCS SELF ADMINISTERED DRUGS (ALT 637 FOR MEDICARE OP)

## 2025-07-27 PROCEDURE — 2500000001 HC RX 250 WO HCPCS SELF ADMINISTERED DRUGS (ALT 637 FOR MEDICARE OP): Performed by: NURSE PRACTITIONER

## 2025-07-27 PROCEDURE — 99232 SBSQ HOSP IP/OBS MODERATE 35: CPT

## 2025-07-27 PROCEDURE — 2500000004 HC RX 250 GENERAL PHARMACY W/ HCPCS (ALT 636 FOR OP/ED)

## 2025-07-27 PROCEDURE — 2500000004 HC RX 250 GENERAL PHARMACY W/ HCPCS (ALT 636 FOR OP/ED): Performed by: NURSE PRACTITIONER

## 2025-07-27 PROCEDURE — 85025 COMPLETE CBC W/AUTO DIFF WBC: CPT

## 2025-07-27 PROCEDURE — 1210000001 HC SEMI-PRIVATE ROOM DAILY

## 2025-07-27 PROCEDURE — 2500000002 HC RX 250 W HCPCS SELF ADMINISTERED DRUGS (ALT 637 FOR MEDICARE OP, ALT 636 FOR OP/ED)

## 2025-07-27 PROCEDURE — 82947 ASSAY GLUCOSE BLOOD QUANT: CPT

## 2025-07-27 PROCEDURE — 36415 COLL VENOUS BLD VENIPUNCTURE: CPT

## 2025-07-27 PROCEDURE — 2500000004 HC RX 250 GENERAL PHARMACY W/ HCPCS (ALT 636 FOR OP/ED): Performed by: HOSPITALIST

## 2025-07-27 PROCEDURE — 83735 ASSAY OF MAGNESIUM: CPT

## 2025-07-27 PROCEDURE — 84075 ASSAY ALKALINE PHOSPHATASE: CPT

## 2025-07-27 RX ORDER — LOPERAMIDE HYDROCHLORIDE 2 MG/1
2 CAPSULE ORAL 4 TIMES DAILY PRN
Status: DISCONTINUED | OUTPATIENT
Start: 2025-07-27 | End: 2025-07-29 | Stop reason: HOSPADM

## 2025-07-27 RX ADMIN — HYDROXYCHLOROQUINE SULFATE 200 MG: 200 TABLET, FILM COATED ORAL at 09:55

## 2025-07-27 RX ADMIN — PREGABALIN 150 MG: 50 CAPSULE ORAL at 09:56

## 2025-07-27 RX ADMIN — LISINOPRIL 30 MG: 20 TABLET ORAL at 09:55

## 2025-07-27 RX ADMIN — FERROUS SULFATE TAB 325 MG (65 MG ELEMENTAL FE) 1 TABLET: 325 (65 FE) TAB at 09:55

## 2025-07-27 RX ADMIN — PREGABALIN 150 MG: 50 CAPSULE ORAL at 20:29

## 2025-07-27 RX ADMIN — PANTOPRAZOLE SODIUM 40 MG: 40 INJECTION, POWDER, FOR SOLUTION INTRAVENOUS at 20:29

## 2025-07-27 RX ADMIN — DOCUSATE SODIUM 50MG AND SENNOSIDES 8.6MG 1 TABLET: 8.6; 5 TABLET, FILM COATED ORAL at 09:56

## 2025-07-27 RX ADMIN — PREGABALIN 150 MG: 50 CAPSULE ORAL at 16:34

## 2025-07-27 RX ADMIN — PIPERACILLIN SODIUM AND TAZOBACTAM SODIUM 3.38 G: 3; .375 INJECTION, SOLUTION INTRAVENOUS at 05:12

## 2025-07-27 RX ADMIN — INSULIN LISPRO 3 UNITS: 100 INJECTION, SOLUTION INTRAVENOUS; SUBCUTANEOUS at 11:48

## 2025-07-27 RX ADMIN — FOLIC ACID 1 MG: 1 TABLET ORAL at 09:55

## 2025-07-27 RX ADMIN — CARVEDILOL 6.25 MG: 6.25 TABLET, FILM COATED ORAL at 20:31

## 2025-07-27 RX ADMIN — LOPERAMIDE HYDROCHLORIDE 2 MG: 2 CAPSULE ORAL at 11:48

## 2025-07-27 RX ADMIN — PIPERACILLIN SODIUM AND TAZOBACTAM SODIUM 3.38 G: 3; .375 INJECTION, SOLUTION INTRAVENOUS at 20:31

## 2025-07-27 RX ADMIN — PIPERACILLIN SODIUM AND TAZOBACTAM SODIUM 3.38 G: 3; .375 INJECTION, SOLUTION INTRAVENOUS at 12:54

## 2025-07-27 RX ADMIN — PANTOPRAZOLE SODIUM 40 MG: 40 INJECTION, POWDER, FOR SOLUTION INTRAVENOUS at 09:56

## 2025-07-27 ASSESSMENT — PAIN SCALES - GENERAL: PAINLEVEL_OUTOF10: 0 - NO PAIN

## 2025-07-27 ASSESSMENT — COGNITIVE AND FUNCTIONAL STATUS - GENERAL
DAILY ACTIVITIY SCORE: 24
MOBILITY SCORE: 24

## 2025-07-27 NOTE — CARE PLAN
Problem: Pain - Adult  Goal: Verbalizes/displays adequate comfort level or baseline comfort level  Outcome: Progressing     Problem: Safety - Adult  Goal: Free from fall injury  Outcome: Progressing     Problem: Discharge Planning  Goal: Discharge to home or other facility with appropriate resources  Outcome: Progressing     Problem: Chronic Conditions and Co-morbidities  Goal: Patient's chronic conditions and co-morbidity symptoms are monitored and maintained or improved  Outcome: Progressing     Problem: Nutrition  Goal: Nutrient intake appropriate for maintaining nutritional needs  Outcome: Progressing   The patient's goals for the shift include      The clinical goals for the shift include Hemoglobin will remain >7    Over the shift, the patient did make progress toward the following goals.

## 2025-07-27 NOTE — PROGRESS NOTES
"Daily Progress Note    Daylin Tyler is a 65 y.o. female on day 2 of admission presenting with GI bleed.    Subjective   Patient seen and examined, resting comfortably in bed after eating breakfast.  States she feels well today.  Denies CP, SOB, abdominal pain, nausea, vomiting.  Has had a few episodes of diarrhea, likely secondary to initiation of Zosyn.  Will add Imodium.  ERCP scheduled for tomorrow.  Further GI and general surgery recs pending results.       Objective   Physical Exam  Constitutional:       Appearance: Normal appearance. She is obese.   HENT:      Head: Normocephalic.      Mouth/Throat:      Mouth: Mucous membranes are moist.     Eyes:      Pupils: Pupils are equal, round, and reactive to light.       Cardiovascular:      Rate and Rhythm: Normal rate and regular rhythm.      Heart sounds: Normal heart sounds, S1 normal and S2 normal.   Pulmonary:      Effort: Pulmonary effort is normal.      Breath sounds: Normal breath sounds.   Abdominal:      General: Bowel sounds are normal.      Palpations: Abdomen is soft.      Tenderness: There is no abdominal tenderness.     Musculoskeletal:         General: Normal range of motion.      Cervical back: Neck supple.      Right lower leg: No edema.      Left lower leg: No edema.     Skin:     General: Skin is warm.     Neurological:      Mental Status: She is alert and oriented to person, place, and time.     Psychiatric:         Mood and Affect: Mood normal.         Behavior: Behavior normal.         Last Recorded Vitals  Blood pressure 106/55, pulse 62, temperature 36.3 °C (97.3 °F), temperature source Temporal, resp. rate 16, height (!) 1.549 m (5' 1\"), weight 79.2 kg (174 lb 9.7 oz), SpO2 99%.  Intake/Output last 3 Shifts:  I/O last 3 completed shifts:  In: 100 (1.3 mL/kg) [IV Piggyback:100]  Out: - (0 mL/kg)   Weight: 79.2 kg     Medications  Scheduled medications  Scheduled Medications[1]  Continuous medications  Continuous Medications[2]  PRN " medications  PRN Medications[3]    Labs  CBC:   Results from last 7 days   Lab Units 07/27/25  0612 07/26/25  0556 07/25/25  0631   WBC AUTO x10*3/uL 3.2* 2.9* 3.7*   RBC AUTO x10*6/uL 2.83* 2.77* 2.85*   HEMOGLOBIN g/dL 8.3* 8.2* 8.4*   HEMATOCRIT % 25.7* 24.8* 26.2*   MCV fL 91 90 92   MCH pg 29.3 29.6 29.5   MCHC g/dL 32.3 33.1 32.1   RDW % 13.6 13.8 13.6   PLATELETS AUTO x10*3/uL 69* 72* 67*     CMP:    Results from last 7 days   Lab Units 07/27/25  0612 07/26/25  0556 07/25/25  0631   SODIUM mmol/L 140 140 140   POTASSIUM mmol/L 3.9 3.9 4.2   CHLORIDE mmol/L 110* 109* 109*   CO2 mmol/L 26 26 27   BUN mg/dL 9 10 9   CREATININE mg/dL 0.55 0.62 0.52   GLUCOSE mg/dL 118* 158* 134*   PROTEIN TOTAL g/dL 5.3* 5.2* 5.4*   CALCIUM mg/dL 8.3* 8.0* 8.3*   BILIRUBIN TOTAL mg/dL 1.5* 1.3* 2.4*   ALK PHOS U/L 206* 216* 231*   AST U/L 114* 145* 149*   ALT U/L 83* 89* 82*     BMP:    Results from last 7 days   Lab Units 07/27/25  0612 07/26/25  0556 07/25/25  0631   SODIUM mmol/L 140 140 140   POTASSIUM mmol/L 3.9 3.9 4.2   CHLORIDE mmol/L 110* 109* 109*   CO2 mmol/L 26 26 27   BUN mg/dL 9 10 9   CREATININE mg/dL 0.55 0.62 0.52   CALCIUM mg/dL 8.3* 8.0* 8.3*   GLUCOSE mg/dL 118* 158* 134*     Magnesium:  Results from last 7 days   Lab Units 07/27/25  0612 07/26/25  0556 07/25/25  0631   MAGNESIUM mg/dL 1.69 1.65 1.73     Troponin:    Results from last 7 days   Lab Units 07/23/25  1901   TROPHS ng/L 4     BNP:     Lipid Panel:  Results from last 7 days   Lab Units 07/24/25  0551 07/23/25  1901   INR  1.2* 1.3*   PROTIME seconds 12.8* 14.1*        Nutrition             Relevant Results  Results from last 7 days   Lab Units 07/27/25  0620 07/27/25  0612 07/26/25  1912 07/26/25  1610 07/26/25  1117 07/26/25  0639 07/26/25  0556 07/25/25  1130 07/25/25  0631 07/24/25  0628 07/24/25  0551 07/23/25  1901   POCT GLUCOSE mg/dL 112*  --  189* 131* 125* 144*  --    < >  --    < >  --   --    GLUCOSE mg/dL  --  118*  --   --   --   --   158*  --  134*  --  103* 129*    < > = values in this interval not displayed.     Lab Results   Component Value Date    HGBA1C 5.3 07/26/2025        Assessment/Plan    BRBPR, likely secondary to hemorrhoids  Compensated cirrhosis with portal venous hypertension  Splenomegaly  Celiac artery stenosis  Iron deficiency anemia  Constipation  - CT A/P without evidence of GI bleed, notes short segment stenosis of the celiac artery ostium concerning for median arcuate ligament syndrome.  Also notes moderate cirrhotic changes with portal vein hypertension, splenomegaly, and diverticulosis without diverticulitis  - EGD 7/2025 showed 3 large grade 3 esophageal varices, banded x 5 with complete eradication  - GI consult, plan for ERCP on Monday at [facility], no endoscopic studies needed  - Continue IV Zosyn per GI recs  - Follow blood cultures, no growth to date  - Continue Coreg for primary prophylaxis of esophageal varices  - PPI twice daily  - Trend LFTs and INR daily  - Encourage alcohol cessation/abstinence  - Continue iron supplement  - Optimize pain control  - Bowel regimen with MiraLAX and Geraldine-Colace  - RUQ US shows cirrhotic liver, acute portal and hepatic venous thrombosis  - ACS consulted, further recs pending ERCP  - Consider pain management consult per GI for MALS and celiac stenosis     Thrombocytopenia  HTN  SLE, not in acute flare  - Thrombocytopenia appears stable, will trend with daily labs  - Continue home antihypertensives  - Continue home Plaquenil     T2DM not requiring insulin  - Hold home Rybelsus  - SSI, Accu-Cheks, diabetic diet  - A1c 3/2025 was 6.3%  - Repeat A1c 5.3%     DVTp: Defer  PLAN: Home    Lupe Beltran PA-C    Plan of care was discussed extensively with patient.  Patient verbalized understanding through teach back method.  All question and concerns addressed upon examination.    Of note, this documentation is completed using the Dragon Dictation system (voice recognition software). There may  be spelling and/or grammatical errors that were not corrected prior to final submission.             [1] carvedilol, 6.25 mg, oral, Nightly  ferrous sulfate, 65 mg of elemental iron, oral, Daily  folic acid, 1 mg, oral, Daily  hydroxychloroquine, 200 mg, oral, Daily  insulin lispro, 0-15 Units, subcutaneous, TID AC  lisinopril, 30 mg, oral, Daily  pantoprazole, 40 mg, intravenous, BID  piperacillin-tazobactam, 3.375 g, intravenous, q8h  polyethylene glycol, 17 g, oral, Daily  pregabalin, 150 mg, oral, TID  sennosides-docusate sodium, 1 tablet, oral, BID  [2]    [3] PRN medications: acetaminophen **OR** acetaminophen **OR** acetaminophen, dextrose, dextrose, glucagon, glucagon, ondansetron **OR** ondansetron

## 2025-07-28 ENCOUNTER — APPOINTMENT (OUTPATIENT)
Dept: RADIOLOGY | Facility: HOSPITAL | Age: 65
End: 2025-07-28
Payer: COMMERCIAL

## 2025-07-28 ENCOUNTER — HOSPITAL ENCOUNTER (OUTPATIENT)
Dept: GASTROENTEROLOGY | Facility: HOSPITAL | Age: 65
Discharge: HOME | End: 2025-07-28
Payer: COMMERCIAL

## 2025-07-28 ENCOUNTER — ANESTHESIA EVENT (OUTPATIENT)
Dept: GASTROENTEROLOGY | Facility: HOSPITAL | Age: 65
End: 2025-07-28
Payer: COMMERCIAL

## 2025-07-28 ENCOUNTER — ANESTHESIA (OUTPATIENT)
Dept: GASTROENTEROLOGY | Facility: HOSPITAL | Age: 65
End: 2025-07-28
Payer: COMMERCIAL

## 2025-07-28 ENCOUNTER — APPOINTMENT (OUTPATIENT)
Dept: CARDIOLOGY | Facility: HOSPITAL | Age: 65
DRG: 445 | End: 2025-07-28
Payer: COMMERCIAL

## 2025-07-28 VITALS
SYSTOLIC BLOOD PRESSURE: 135 MMHG | DIASTOLIC BLOOD PRESSURE: 57 MMHG | OXYGEN SATURATION: 96 % | RESPIRATION RATE: 19 BRPM | TEMPERATURE: 97.2 F | HEART RATE: 58 BPM

## 2025-07-28 LAB
ALBUMIN SERPL BCP-MCNC: 3.2 G/DL (ref 3.4–5)
ALP SERPL-CCNC: 202 U/L (ref 33–136)
ALT SERPL W P-5'-P-CCNC: 69 U/L (ref 7–45)
ANION GAP SERPL CALC-SCNC: 9 MMOL/L (ref 10–20)
APTT PPP: 33 SECONDS (ref 26–36)
AST SERPL W P-5'-P-CCNC: 81 U/L (ref 9–39)
ATRIAL RATE: 61 BPM
BASOPHILS # BLD AUTO: 0.04 X10*3/UL (ref 0–0.1)
BASOPHILS NFR BLD AUTO: 1 %
BILIRUB SERPL-MCNC: 1.2 MG/DL (ref 0–1.2)
BUN SERPL-MCNC: 12 MG/DL (ref 6–23)
CALCIUM SERPL-MCNC: 8.4 MG/DL (ref 8.6–10.3)
CHLORIDE SERPL-SCNC: 110 MMOL/L (ref 98–107)
CO2 SERPL-SCNC: 25 MMOL/L (ref 21–32)
CREAT SERPL-MCNC: 0.67 MG/DL (ref 0.5–1.05)
EGFRCR SERPLBLD CKD-EPI 2021: >90 ML/MIN/1.73M*2
EOSINOPHIL # BLD AUTO: 0.26 X10*3/UL (ref 0–0.7)
EOSINOPHIL NFR BLD AUTO: 6.6 %
ERYTHROCYTE [DISTWIDTH] IN BLOOD BY AUTOMATED COUNT: 13.8 % (ref 11.5–14.5)
GLUCOSE BLD MANUAL STRIP-MCNC: 116 MG/DL (ref 74–99)
GLUCOSE BLD MANUAL STRIP-MCNC: 117 MG/DL (ref 74–99)
GLUCOSE BLD MANUAL STRIP-MCNC: 124 MG/DL (ref 74–99)
GLUCOSE BLD MANUAL STRIP-MCNC: 339 MG/DL (ref 74–99)
GLUCOSE SERPL-MCNC: 120 MG/DL (ref 74–99)
HCT VFR BLD AUTO: 27.5 % (ref 36–46)
HGB BLD-MCNC: 8.8 G/DL (ref 12–16)
HOLD SPECIMEN: NORMAL
HOLD SPECIMEN: NORMAL
IMM GRANULOCYTES # BLD AUTO: 0.03 X10*3/UL (ref 0–0.7)
IMM GRANULOCYTES NFR BLD AUTO: 0.8 % (ref 0–0.9)
INR PPP: 1.1 (ref 0.9–1.1)
LYMPHOCYTES # BLD AUTO: 0.84 X10*3/UL (ref 1.2–4.8)
LYMPHOCYTES NFR BLD AUTO: 21.4 %
MAGNESIUM SERPL-MCNC: 1.72 MG/DL (ref 1.6–2.4)
MCH RBC QN AUTO: 29.2 PG (ref 26–34)
MCHC RBC AUTO-ENTMCNC: 32 G/DL (ref 32–36)
MCV RBC AUTO: 91 FL (ref 80–100)
MONOCYTES # BLD AUTO: 0.32 X10*3/UL (ref 0.1–1)
MONOCYTES NFR BLD AUTO: 8.1 %
NEUTROPHILS # BLD AUTO: 2.44 X10*3/UL (ref 1.2–7.7)
NEUTROPHILS NFR BLD AUTO: 62.1 %
NRBC BLD-RTO: 0 /100 WBCS (ref 0–0)
P AXIS: 21 DEGREES
P OFFSET: 173 MS
P ONSET: 131 MS
PLATELET # BLD AUTO: 82 X10*3/UL (ref 150–450)
POTASSIUM SERPL-SCNC: 4 MMOL/L (ref 3.5–5.3)
PR INTERVAL: 170 MS
PROT SERPL-MCNC: 5.5 G/DL (ref 6.4–8.2)
PROTHROMBIN TIME: 12.5 SECONDS (ref 9.8–12.4)
Q ONSET: 216 MS
QRS COUNT: 10 BEATS
QRS DURATION: 84 MS
QT INTERVAL: 426 MS
QTC CALCULATION(BAZETT): 428 MS
QTC FREDERICIA: 428 MS
R AXIS: 19 DEGREES
RBC # BLD AUTO: 3.01 X10*6/UL (ref 4–5.2)
SODIUM SERPL-SCNC: 140 MMOL/L (ref 136–145)
T AXIS: 28 DEGREES
T OFFSET: 429 MS
VENTRICULAR RATE: 61 BPM
WBC # BLD AUTO: 3.9 X10*3/UL (ref 4.4–11.3)

## 2025-07-28 PROCEDURE — 2500000004 HC RX 250 GENERAL PHARMACY W/ HCPCS (ALT 636 FOR OP/ED): Performed by: HOSPITALIST

## 2025-07-28 PROCEDURE — 99239 HOSP IP/OBS DSCHRG MGMT >30: CPT

## 2025-07-28 PROCEDURE — 7100000002 HC RECOVERY ROOM TIME - EACH INCREMENTAL 1 MINUTE

## 2025-07-28 PROCEDURE — 0FC98ZZ EXTIRPATION OF MATTER FROM COMMON BILE DUCT, VIA NATURAL OR ARTIFICIAL OPENING ENDOSCOPIC: ICD-10-PCS | Performed by: STUDENT IN AN ORGANIZED HEALTH CARE EDUCATION/TRAINING PROGRAM

## 2025-07-28 PROCEDURE — 74328 X-RAY BILE DUCT ENDOSCOPY: CPT | Performed by: STUDENT IN AN ORGANIZED HEALTH CARE EDUCATION/TRAINING PROGRAM

## 2025-07-28 PROCEDURE — 82947 ASSAY GLUCOSE BLOOD QUANT: CPT

## 2025-07-28 PROCEDURE — 2500000005 HC RX 250 GENERAL PHARMACY W/O HCPCS: Performed by: NURSE ANESTHETIST, CERTIFIED REGISTERED

## 2025-07-28 PROCEDURE — 43264 ERCP REMOVE DUCT CALCULI: CPT | Performed by: STUDENT IN AN ORGANIZED HEALTH CARE EDUCATION/TRAINING PROGRAM

## 2025-07-28 PROCEDURE — 3700000002 HC GENERAL ANESTHESIA TIME - EACH INCREMENTAL 1 MINUTE

## 2025-07-28 PROCEDURE — 2550000001 HC RX 255 CONTRASTS: Performed by: STUDENT IN AN ORGANIZED HEALTH CARE EDUCATION/TRAINING PROGRAM

## 2025-07-28 PROCEDURE — C1726 CATH, BAL DIL, NON-VASCULAR: HCPCS

## 2025-07-28 PROCEDURE — 43273 ENDOSCOPIC PANCREATOSCOPY: CPT | Performed by: STUDENT IN AN ORGANIZED HEALTH CARE EDUCATION/TRAINING PROGRAM

## 2025-07-28 PROCEDURE — 2500000002 HC RX 250 W HCPCS SELF ADMINISTERED DRUGS (ALT 637 FOR MEDICARE OP, ALT 636 FOR OP/ED): Performed by: STUDENT IN AN ORGANIZED HEALTH CARE EDUCATION/TRAINING PROGRAM

## 2025-07-28 PROCEDURE — 1210000001 HC SEMI-PRIVATE ROOM DAILY

## 2025-07-28 PROCEDURE — 2500000001 HC RX 250 WO HCPCS SELF ADMINISTERED DRUGS (ALT 637 FOR MEDICARE OP): Performed by: NURSE PRACTITIONER

## 2025-07-28 PROCEDURE — 99231 SBSQ HOSP IP/OBS SF/LOW 25: CPT | Performed by: NURSE PRACTITIONER

## 2025-07-28 PROCEDURE — 85610 PROTHROMBIN TIME: CPT

## 2025-07-28 PROCEDURE — 2500000004 HC RX 250 GENERAL PHARMACY W/ HCPCS (ALT 636 FOR OP/ED): Performed by: NURSE PRACTITIONER

## 2025-07-28 PROCEDURE — 2500000004 HC RX 250 GENERAL PHARMACY W/ HCPCS (ALT 636 FOR OP/ED): Performed by: NURSE ANESTHETIST, CERTIFIED REGISTERED

## 2025-07-28 PROCEDURE — 99232 SBSQ HOSP IP/OBS MODERATE 35: CPT

## 2025-07-28 PROCEDURE — BF101ZZ FLUOROSCOPY OF BILE DUCTS USING LOW OSMOLAR CONTRAST: ICD-10-PCS | Performed by: STUDENT IN AN ORGANIZED HEALTH CARE EDUCATION/TRAINING PROGRAM

## 2025-07-28 PROCEDURE — 3700000001 HC GENERAL ANESTHESIA TIME - INITIAL BASE CHARGE

## 2025-07-28 PROCEDURE — 83735 ASSAY OF MAGNESIUM: CPT

## 2025-07-28 PROCEDURE — 43277 ERCP EA DUCT/AMPULLA DILATE: CPT | Performed by: STUDENT IN AN ORGANIZED HEALTH CARE EDUCATION/TRAINING PROGRAM

## 2025-07-28 PROCEDURE — 85025 COMPLETE CBC W/AUTO DIFF WBC: CPT

## 2025-07-28 PROCEDURE — C1769 GUIDE WIRE: HCPCS

## 2025-07-28 PROCEDURE — 7100000001 HC RECOVERY ROOM TIME - INITIAL BASE CHARGE

## 2025-07-28 PROCEDURE — 2720000007 HC OR 272 NO HCPCS

## 2025-07-28 PROCEDURE — 43262 ENDO CHOLANGIOPANCREATOGRAPH: CPT | Performed by: STUDENT IN AN ORGANIZED HEALTH CARE EDUCATION/TRAINING PROGRAM

## 2025-07-28 PROCEDURE — 36415 COLL VENOUS BLD VENIPUNCTURE: CPT

## 2025-07-28 PROCEDURE — 2500000004 HC RX 250 GENERAL PHARMACY W/ HCPCS (ALT 636 FOR OP/ED)

## 2025-07-28 PROCEDURE — 93005 ELECTROCARDIOGRAM TRACING: CPT

## 2025-07-28 PROCEDURE — A43264 PR ERCP REMOVE CALCULI/DEBRIS BILIARY/PANCREAS DUCT: Performed by: NURSE ANESTHETIST, CERTIFIED REGISTERED

## 2025-07-28 PROCEDURE — A43264 PR ERCP REMOVE CALCULI/DEBRIS BILIARY/PANCREAS DUCT: Performed by: ANESTHESIOLOGY

## 2025-07-28 PROCEDURE — 80053 COMPREHEN METABOLIC PANEL: CPT

## 2025-07-28 RX ORDER — SODIUM CHLORIDE, SODIUM LACTATE, POTASSIUM CHLORIDE, CALCIUM CHLORIDE 600; 310; 30; 20 MG/100ML; MG/100ML; MG/100ML; MG/100ML
100 INJECTION, SOLUTION INTRAVENOUS CONTINUOUS
Status: DISCONTINUED | OUTPATIENT
Start: 2025-07-28 | End: 2025-07-29 | Stop reason: HOSPADM

## 2025-07-28 RX ORDER — PROPOFOL 10 MG/ML
INJECTION, EMULSION INTRAVENOUS AS NEEDED
Status: DISCONTINUED | OUTPATIENT
Start: 2025-07-28 | End: 2025-07-28

## 2025-07-28 RX ORDER — ACETAMINOPHEN 325 MG/1
975 TABLET ORAL ONCE
Status: DISCONTINUED | OUTPATIENT
Start: 2025-07-28 | End: 2025-07-29 | Stop reason: HOSPADM

## 2025-07-28 RX ORDER — MIDAZOLAM HYDROCHLORIDE 1 MG/ML
1 INJECTION, SOLUTION INTRAMUSCULAR; INTRAVENOUS ONCE AS NEEDED
Status: DISCONTINUED | OUTPATIENT
Start: 2025-07-28 | End: 2025-07-29 | Stop reason: HOSPADM

## 2025-07-28 RX ORDER — ONDANSETRON HYDROCHLORIDE 2 MG/ML
4 INJECTION, SOLUTION INTRAVENOUS ONCE AS NEEDED
Status: DISCONTINUED | OUTPATIENT
Start: 2025-07-28 | End: 2025-07-29 | Stop reason: HOSPADM

## 2025-07-28 RX ORDER — HYDRALAZINE HYDROCHLORIDE 20 MG/ML
5 INJECTION INTRAMUSCULAR; INTRAVENOUS EVERY 30 MIN PRN
Status: DISCONTINUED | OUTPATIENT
Start: 2025-07-28 | End: 2025-07-29 | Stop reason: HOSPADM

## 2025-07-28 RX ORDER — HYDROMORPHONE HYDROCHLORIDE 1 MG/ML
1 INJECTION, SOLUTION INTRAMUSCULAR; INTRAVENOUS; SUBCUTANEOUS EVERY 5 MIN PRN
Status: DISCONTINUED | OUTPATIENT
Start: 2025-07-28 | End: 2025-07-29 | Stop reason: HOSPADM

## 2025-07-28 RX ORDER — DIPHENHYDRAMINE HYDROCHLORIDE 50 MG/ML
12.5 INJECTION, SOLUTION INTRAMUSCULAR; INTRAVENOUS ONCE AS NEEDED
Status: DISCONTINUED | OUTPATIENT
Start: 2025-07-28 | End: 2025-07-29 | Stop reason: HOSPADM

## 2025-07-28 RX ORDER — LIDOCAINE HYDROCHLORIDE 20 MG/ML
INJECTION, SOLUTION EPIDURAL; INFILTRATION; INTRACAUDAL; PERINEURAL AS NEEDED
Status: DISCONTINUED | OUTPATIENT
Start: 2025-07-28 | End: 2025-07-28

## 2025-07-28 RX ORDER — MEPERIDINE HYDROCHLORIDE 50 MG/ML
12.5 INJECTION INTRAMUSCULAR; INTRAVENOUS; SUBCUTANEOUS EVERY 10 MIN PRN
Status: DISCONTINUED | OUTPATIENT
Start: 2025-07-28 | End: 2025-07-29 | Stop reason: HOSPADM

## 2025-07-28 RX ORDER — FENTANYL CITRATE 50 UG/ML
INJECTION, SOLUTION INTRAMUSCULAR; INTRAVENOUS AS NEEDED
Status: DISCONTINUED | OUTPATIENT
Start: 2025-07-28 | End: 2025-07-28

## 2025-07-28 RX ORDER — OXYCODONE HYDROCHLORIDE 5 MG/1
5 TABLET ORAL EVERY 4 HOURS PRN
Status: DISCONTINUED | OUTPATIENT
Start: 2025-07-28 | End: 2025-07-29 | Stop reason: HOSPADM

## 2025-07-28 RX ORDER — ALBUTEROL SULFATE 0.83 MG/ML
2.5 SOLUTION RESPIRATORY (INHALATION) ONCE AS NEEDED
Status: DISCONTINUED | OUTPATIENT
Start: 2025-07-28 | End: 2025-07-29 | Stop reason: HOSPADM

## 2025-07-28 RX ORDER — PHENYLEPHRINE HCL IN 0.9% NACL 1 MG/10 ML
SYRINGE (ML) INTRAVENOUS AS NEEDED
Status: DISCONTINUED | OUTPATIENT
Start: 2025-07-28 | End: 2025-07-28

## 2025-07-28 RX ORDER — INSULIN LISPRO 100 [IU]/ML
8 INJECTION, SOLUTION INTRAVENOUS; SUBCUTANEOUS ONCE
Status: COMPLETED | OUTPATIENT
Start: 2025-07-28 | End: 2025-07-28

## 2025-07-28 RX ORDER — LIDOCAINE HYDROCHLORIDE 10 MG/ML
0.1 INJECTION, SOLUTION INFILTRATION; PERINEURAL ONCE
Status: DISCONTINUED | OUTPATIENT
Start: 2025-07-28 | End: 2025-07-29 | Stop reason: HOSPADM

## 2025-07-28 RX ORDER — FENTANYL CITRATE 50 UG/ML
12.5 INJECTION, SOLUTION INTRAMUSCULAR; INTRAVENOUS EVERY 5 MIN PRN
Status: DISCONTINUED | OUTPATIENT
Start: 2025-07-28 | End: 2025-07-29 | Stop reason: HOSPADM

## 2025-07-28 RX ORDER — SODIUM CHLORIDE, SODIUM LACTATE, POTASSIUM CHLORIDE, CALCIUM CHLORIDE 600; 310; 30; 20 MG/100ML; MG/100ML; MG/100ML; MG/100ML
INJECTION, SOLUTION INTRAVENOUS CONTINUOUS PRN
Status: DISCONTINUED | OUTPATIENT
Start: 2025-07-28 | End: 2025-07-28

## 2025-07-28 RX ORDER — ROCURONIUM BROMIDE 50 MG/5 ML
SYRINGE (ML) INTRAVENOUS AS NEEDED
Status: DISCONTINUED | OUTPATIENT
Start: 2025-07-28 | End: 2025-07-28

## 2025-07-28 RX ADMIN — CARVEDILOL 6.25 MG: 6.25 TABLET, FILM COATED ORAL at 20:26

## 2025-07-28 RX ADMIN — ONDANSETRON 4 MG: 2 INJECTION, SOLUTION INTRAMUSCULAR; INTRAVENOUS at 11:36

## 2025-07-28 RX ADMIN — DEXAMETHASONE SODIUM PHOSPHATE 8 MG: 4 INJECTION, SOLUTION INTRAMUSCULAR; INTRAVENOUS at 11:36

## 2025-07-28 RX ADMIN — Medication 100 MCG: at 11:33

## 2025-07-28 RX ADMIN — PREGABALIN 150 MG: 50 CAPSULE ORAL at 17:08

## 2025-07-28 RX ADMIN — PROPOFOL 20 MG: 10 INJECTION, EMULSION INTRAVENOUS at 11:50

## 2025-07-28 RX ADMIN — PIPERACILLIN SODIUM AND TAZOBACTAM SODIUM 3.38 G: 3; .375 INJECTION, SOLUTION INTRAVENOUS at 20:25

## 2025-07-28 RX ADMIN — PIPERACILLIN SODIUM AND TAZOBACTAM SODIUM 3.38 G: 3; .375 INJECTION, SOLUTION INTRAVENOUS at 05:24

## 2025-07-28 RX ADMIN — DOCUSATE SODIUM 50MG AND SENNOSIDES 8.6MG 1 TABLET: 8.6; 5 TABLET, FILM COATED ORAL at 20:26

## 2025-07-28 RX ADMIN — INSULIN LISPRO 8 UNITS: 100 INJECTION, SOLUTION INTRAVENOUS; SUBCUTANEOUS at 21:40

## 2025-07-28 RX ADMIN — LIDOCAINE HYDROCHLORIDE 60 MG: 20 INJECTION, SOLUTION EPIDURAL; INFILTRATION; INTRACAUDAL; PERINEURAL at 11:25

## 2025-07-28 RX ADMIN — IOHEXOL 10 ML: 350 INJECTION, SOLUTION INTRAVENOUS at 11:11

## 2025-07-28 RX ADMIN — PREGABALIN 150 MG: 50 CAPSULE ORAL at 20:25

## 2025-07-28 RX ADMIN — SUGAMMADEX 200 MG: 100 INJECTION, SOLUTION INTRAVENOUS at 11:50

## 2025-07-28 RX ADMIN — Medication 50 MG: at 11:26

## 2025-07-28 RX ADMIN — SODIUM CHLORIDE, POTASSIUM CHLORIDE, SODIUM LACTATE AND CALCIUM CHLORIDE: 600; 310; 30; 20 INJECTION, SOLUTION INTRAVENOUS at 11:19

## 2025-07-28 RX ADMIN — PROPOFOL 160 MG: 10 INJECTION, EMULSION INTRAVENOUS at 11:26

## 2025-07-28 RX ADMIN — FENTANYL CITRATE 50 MCG: 50 INJECTION, SOLUTION INTRAMUSCULAR; INTRAVENOUS at 11:25

## 2025-07-28 RX ADMIN — PANTOPRAZOLE SODIUM 40 MG: 40 INJECTION, POWDER, FOR SOLUTION INTRAVENOUS at 20:26

## 2025-07-28 ASSESSMENT — COGNITIVE AND FUNCTIONAL STATUS - GENERAL
MOBILITY SCORE: 24
DAILY ACTIVITIY SCORE: 24

## 2025-07-28 ASSESSMENT — PAIN SCALES - GENERAL
PAINLEVEL_OUTOF10: 0 - NO PAIN

## 2025-07-28 ASSESSMENT — PAIN - FUNCTIONAL ASSESSMENT
PAIN_FUNCTIONAL_ASSESSMENT: 0-10

## 2025-07-28 NOTE — DISCHARGE INSTRUCTIONS
Patient Instructions after an Endoscopy      Post-procedure recommendations:  - The patient will be observed post-procedure, until all discharge criteria are met.   - Discharge the patient to home with family member/escort.  - Resume previous diet.  - Continue present medications.  - Observe patient's clinical course following today's procedure with therapeutic intervention.   - Watch for bleeding, perforation, and infection.   - Follow-up with referring physician.   - Return to primary care physician.  - The patient has a contact number available for  emergencies.  The signs and symptoms of potential delayed complications were discussed with the patient.  Return to normal activities tomorrow.  Written discharge instructions were provided to the patient.    The anesthetics, sedatives or narcotics which were given to you today will be acting in your body for the next 24 hours, so you might feel a little sleepy or groggy.  This feeling should slowly wear off. Carefully read and follow the instructions.     You received sedation today:  - Do not drive or operate any machinery or power tools of any kind.   - No alcoholic beverages today, not even beer or wine.  - Do not make any important decisions or sign any legal documents.  - No over the counter medications that contain alcohol or that may cause drowsiness.  - Do not make any important decisions or sign any legal documents.    While it is common to experience mild to moderate abdominal distention, gas, or belching after your procedure, if any of these symptoms occur following discharge from the GI Lab or within one week of having your procedure, call the [facility] to be advised whether a visit to your nearest Urgent Care or Emergency Department is indicated.  Take this paper with you if you go:  - If you develop an allergic reaction to the medications that were given during your procedure such as difficulty breathing, rash, hives, severe nausea,  vomiting or lightheadedness.  - If you experience chest pain, shortness of breath, severe abdominal pain, fevers and chills.  - If you develop signs and symptoms of bleeding such as blood in your spit, if your stools turn black, tarry, or bloody.  - If you have not urinated within 8 hours following your procedure.  - If your IV site becomes painful, red, inflamed, or looks infected.       If you experience any problems or have any questions following discharge from the GI Lab, please call: [phone]

## 2025-07-28 NOTE — PROGRESS NOTES
General Surgery Progress Note    Patient: Daylin Tyler  Unit/Bed: 1109/1109-A  YOB: 1960  MRN: [id number]  Acct: [id number]   Admitting Diagnosis: Epigastric abdominal pain [R10.13]  GI bleed [K92.2]  Elevated transaminase level [R74.01]  History of esophageal varices with bleeding [Z87.19]  Cirrhosis of liver without ascites, unspecified hepatic cirrhosis type (Multi) [K74.60]  Lesion of adrenal gland (Multi) [E27.9]  Date:  7/23/2025  Hospital Day: 3  Attending: Indio Ibrahim MD    Complaint:  Chief Complaint   Patient presents with    Abdominal Cramping     Abdominal cramping for a very long time.       Subjective  Patient seen and examined this morning. No acute events overnight. Patient states she is feeling so much better since her ERCP at [facility].  Patient reports minimal abdominal discomfort rating it a 2 out of 1-10 scale.       PHYSICAL EXAM:  Physical Exam  Vitals and nursing note reviewed.   Constitutional:       General: She is awake.      Appearance: Normal appearance. She is overweight.   HENT:      Head: Normocephalic.      Nose: Nose normal.      Mouth/Throat:      Mouth: Mucous membranes are moist.     Cardiovascular:      Rate and Rhythm: Normal rate and regular rhythm.      Heart sounds: Normal heart sounds.   Pulmonary:      Effort: Pulmonary effort is normal.      Breath sounds: Normal breath sounds.   Abdominal:      General: Abdomen is protuberant. Bowel sounds are normal.      Palpations: Abdomen is soft.      Tenderness: There is abdominal tenderness in the right upper quadrant.     Skin:     General: Skin is warm.      Capillary Refill: Capillary refill takes less than 2 seconds.     Neurological:      General: No focal deficit present.      Mental Status: She is alert and oriented to person, place, and time.     Psychiatric:         Mood and Affect: Mood normal.         Speech: Speech normal.         Behavior: Behavior is cooperative.       Vital signs in  last 24 hours:  Vitals:    07/28/25 1557   BP: 137/63   Pulse: 62   Resp:    Temp: 36.4 °C (97.5 °F)   SpO2: 99%     Intake/Output this shift:  No intake or output data in the 24 hours ending 07/28/25 3609   Allergies:  Allergies[1]   Medications:  Scheduled medications  Scheduled Medications[2]  Continuous medications  Continuous Medications[3]  PRN medications  PRN Medications[4]  Labs:  Results for orders placed or performed during the hospital encounter of 07/23/25 (from the past 24 hours)   POCT GLUCOSE   Result Value Ref Range    POCT Glucose 226 (H) 74 - 99 mg/dL   CBC and Auto Differential   Result Value Ref Range    WBC 3.9 (L) 4.4 - 11.3 x10*3/uL    nRBC 0.0 0.0 - 0.0 /100 WBCs    RBC 3.01 (L) 4.00 - 5.20 x10*6/uL    Hemoglobin 8.8 (L) 12.0 - 16.0 g/dL    Hematocrit 27.5 (L) 36.0 - 46.0 %    MCV 91 80 - 100 fL    MCH 29.2 26.0 - 34.0 pg    MCHC 32.0 32.0 - 36.0 g/dL    RDW 13.8 11.5 - 14.5 %    Platelets 82 (L) 150 - 450 x10*3/uL    Neutrophils % 62.1 40.0 - 80.0 %    Immature Granulocytes %, Automated 0.8 0.0 - 0.9 %    Lymphocytes % 21.4 13.0 - 44.0 %    Monocytes % 8.1 2.0 - 10.0 %    Eosinophils % 6.6 0.0 - 6.0 %    Basophils % 1.0 0.0 - 2.0 %    Neutrophils Absolute 2.44 1.20 - 7.70 x10*3/uL    Immature Granulocytes Absolute, Automated 0.03 0.00 - 0.70 x10*3/uL    Lymphocytes Absolute 0.84 (L) 1.20 - 4.80 x10*3/uL    Monocytes Absolute 0.32 0.10 - 1.00 x10*3/uL    Eosinophils Absolute 0.26 0.00 - 0.70 x10*3/uL    Basophils Absolute 0.04 0.00 - 0.10 x10*3/uL   Comprehensive metabolic panel   Result Value Ref Range    Glucose 120 (H) 74 - 99 mg/dL    Sodium 140 136 - 145 mmol/L    Potassium 4.0 3.5 - 5.3 mmol/L    Chloride 110 (H) 98 - 107 mmol/L    Bicarbonate 25 21 - 32 mmol/L    Anion Gap 9 (L) 10 - 20 mmol/L    Urea Nitrogen 12 6 - 23 mg/dL    Creatinine 0.67 0.50 - 1.05 mg/dL    eGFR >90 >60 mL/min/1.73m*2    Calcium 8.4 (L) 8.6 - 10.3 mg/dL    Albumin 3.2 (L) 3.4 - 5.0 g/dL    Alkaline Phosphatase  "202 (H) 33 - 136 U/L    Total Protein 5.5 (L) 6.4 - 8.2 g/dL    AST 81 (H) 9 - 39 U/L    Bilirubin, Total 1.2 0.0 - 1.2 mg/dL    ALT 69 (H) 7 - 45 U/L   Magnesium   Result Value Ref Range    Magnesium 1.72 1.60 - 2.40 mg/dL   Coagulation Screen   Result Value Ref Range    Protime 12.5 (H) 9.8 - 12.4 seconds    INR 1.1 0.9 - 1.1    aPTT 33 26 - 36 seconds   SST TOP   Result Value Ref Range    Extra Tube Hold for add-ons.    POCT GLUCOSE   Result Value Ref Range    POCT Glucose 117 (H) 74 - 99 mg/dL   ECG 12 lead   Result Value Ref Range    Ventricular Rate 61 BPM    Atrial Rate 61 BPM    HI Interval 170 ms    QRS Duration 84 ms    QT Interval 426 ms    QTC Calculation(Bazett) 428 ms    P Axis 21 degrees    R Axis 19 degrees    T Axis 28 degrees    QRS Count 10 beats    Q Onset 216 ms    P Onset 131 ms    P Offset 173 ms    T Offset 429 ms    QTC Fredericia 428 ms      Imaging:  Imaging  No results found.    Cardiology, Vascular, and Other Imaging  FL fluoro images no charge  Result Date: 7/28/2025  These images are not reportable by radiology and will not be interpreted by  Radiologists.    Endoscopic Retrograde Cholangiopancreatography (ERCP)  Result Date: 7/28/2025  Table formatting from the original result was not included. Impression Small biliary stone removed with a combination of sphincterotomy, sphincteroplasty and balloon sweeps Indocin not administered due to history of cirrhosis Findings The  film appeared normal. The major papilla was native. The major papilla had a normal appearance. The common bile duct was deeply cannulated after 2 attempts using a traction sphincterotome with 260 cm x 0.025\" straight guidewire. Cannulation was not difficult. An initial contrast injection was performed successfully in the common bile duct. The injection extended throughout the entire biliary tree. Ductal flow was adequate. Additional filling defects were observed. The study's image quality was acceptable. Single " non-obstructing stone with sludge but without pus was noted in the common bile duct. The stone was 5 mm. Complete large biliary sphincterotomy was performed using a sphincterotome. No bleeding was noted at the procedure site. Multiple sweeps were performed in the common bile duct using a 11.5 mm balloon. Sludge was removed, achieving partial clearance. Performed biliary sphincteroplasty with 1 step of dilation using 40 mm long dilator. For step 1, dilator was expanded to 8 mm for 30 s with moderate resistance. Sweeping was performed in the common bile duct using a 15 mm balloon. Stones were removed, achieving complete clearance. A completion contrast injection was performed successfully in the common bile duct. The injection extended throughout the entire biliary tree. Ductal flow was adequate. No additional filling defects were observed. The study's image quality was acceptable. Recommendation Watch for complications Resume prior diet  Indication Cirrhosis of liver without ascites, unspecified hepatic cirrhosis type (Multi) Abdominal pain, acute, right upper quadrant Elevated LFTs Choledocholithiasis Post-Op Diagnosis None Staff Staff Role Jimmy Schulz MD Proceduralist Medications See Anesthesia Record. Preprocedure A history and physical has been performed, and patient medication allergies have been reviewed. The patient's tolerance of previous anesthesia has been reviewed. The risks and benefits of the procedure and the sedation options and risks were discussed with the patient. All questions were answered and informed consent obtained. Rectal Indomethacin was not administered. Details of the Procedure The patient underwent general anesthesia, which was administered by an anesthesia professional. The patient's blood pressure, heart rate, level of consciousness, oxygen saturation, respirations, ECG and ETCO2 were monitored throughout the procedure. The scope was introduced through the mouth and advanced to the  second part of the duodenum. Insufflated with carbon dioxide. Clinical intention was achieved. The patient experienced no blood loss. The procedure was not difficult. The patient tolerated the procedure well. There were no apparent adverse events. Events Procedure Events Event Event Time ENDO SCOPE IN TIME 7/28/2025 11:35 AM ENDO SCOPE OUT TIME 7/28/2025 11:47 AM Specimens No specimens collected Procedure Location [facility] [address] [address] [phone] Referring Provider CHRISTINA Hairston-CNP Procedure Provider Jimmy Schulz MD     ECG 12 lead  Result Date: 7/28/2025  Normal sinus rhythm Cannot rule out Anterior infarct (cited on or before 07-JUL-2025) Abnormal ECG When compared with ECG of 07-JUL-2025 11:13, (unconfirmed) No significant change was found See ED provider note for full interpretation and clinical correlation       Assessment  Cholecystectomy after ERCP    On exam abdomen soft, protuberant, and tender to the RUQ with palpation rating 2 on a 1-10 scale.  Bowel sounds present. Lung sounds clear, respirations equal and unlabored.  ERCP today at [facility] was successful in achieving complete clearance following spincterotomy, spincteroplasty, and balloon sweeps; Findings discussed with Dr Cottno.  Morning laboratory results show WBC 3.9 up from 3.2, HGB 8.8 up from 8.3, Alkaline phosphatase 202 down from 206, ALT 69 down from 83, and AST 81 down from 114.  Afebrile.      Plan  -Regular diet  -CMP in AM  -Pain control  -Nausea control  -DVT prophylaxis- SCD  -Pulmonary toileting- C&DB  -Encourage ambulation- OOB walking   -Continue care per primary team        Further recommendations per Dr. Yury Baez, APRN-CNP    Time spent  37  minutes obtaining labs, imaging, recommendations, interview, assessment, examination, medication review/ordering, and EMR review.    Plan of care was discussed extensively with patient.  Patient verbalized understanding through teach back method. All questions and concerns addressed upon examination.     Of note, this documentation is completed using the Dragon Dictation system (voice recognition software). There may be spelling and/or grammatical errors that were not corrected prior to final submission.           [1]   Allergies  Allergen Reactions    Metformin GI Upset     Side effects stopped when medication was discontinued    Sulfamethoxazole Itching and Swelling   [2] carvedilol, 6.25 mg, oral, Nightly  ferrous sulfate, 65 mg of elemental iron, oral, Daily  folic acid, 1 mg, oral, Daily  hydroxychloroquine, 200 mg, oral, Daily  insulin lispro, 0-15 Units, subcutaneous, TID AC  lisinopril, 30 mg, oral, Daily  pantoprazole, 40 mg, intravenous, BID  piperacillin-tazobactam, 3.375 g, intravenous, q8h  polyethylene glycol, 17 g, oral, Daily  pregabalin, 150 mg, oral, TID  sennosides-docusate sodium, 1 tablet, oral, BID  sodium chloride, 500 mL, intravenous, Once    [3]    [4] PRN medications: acetaminophen **OR** acetaminophen **OR** acetaminophen, dextrose, dextrose, glucagon, glucagon, loperamide, ondansetron **OR** ondansetron

## 2025-07-28 NOTE — PROGRESS NOTES
"Daily Progress Note    Daylin Tyler is a 65 y.o. female on day 3 of admission presenting with GI bleed.    Subjective   Patient seen and examined, resting comfortably in bed after returning from [facility] for ERCP.  GI removed a small stone and performed sphincterotomy and balloon sweeping.  Patient states she feels well and is tolerating a diet.  Denies CP, SOB, abdominal pain, nausea, vomiting, diarrhea.  Spoke with general surgery, will need to keep the patient overnight for possible intervention.       Objective   Physical Exam  Constitutional:       Appearance: Normal appearance.   HENT:      Head: Normocephalic.      Mouth/Throat:      Mouth: Mucous membranes are moist.     Eyes:      Pupils: Pupils are equal, round, and reactive to light.       Cardiovascular:      Rate and Rhythm: Normal rate and regular rhythm.      Heart sounds: Normal heart sounds, S1 normal and S2 normal.   Pulmonary:      Effort: Pulmonary effort is normal.      Breath sounds: Normal breath sounds.   Abdominal:      General: Bowel sounds are normal.      Palpations: Abdomen is soft.      Tenderness: There is abdominal tenderness in the right upper quadrant.     Musculoskeletal:         General: Normal range of motion.      Cervical back: Neck supple.     Skin:     General: Skin is warm.     Neurological:      Mental Status: She is alert and oriented to person, place, and time.      Motor: No weakness.     Psychiatric:         Mood and Affect: Mood normal.         Behavior: Behavior normal.         Last Recorded Vitals  Blood pressure 137/63, pulse 62, temperature 36.4 °C (97.5 °F), resp. rate 18, height (!) 1.549 m (5' 1\"), weight 79.2 kg (174 lb 9.7 oz), SpO2 99%.  Intake/Output last 3 Shifts:  I/O last 3 completed shifts:  In: 420 (5.3 mL/kg) [P.O.:320; IV Piggyback:100]  Out: - (0 mL/kg)   Weight: 79.2 kg     Medications  Scheduled medications  Scheduled Medications[1]  Continuous medications  Continuous Medications[2]  PRN " medications  PRN Medications[3]    Labs  CBC:   Results from last 7 days   Lab Units 07/28/25  0602 07/27/25  0612 07/26/25  0556   WBC AUTO x10*3/uL 3.9* 3.2* 2.9*   RBC AUTO x10*6/uL 3.01* 2.83* 2.77*   HEMOGLOBIN g/dL 8.8* 8.3* 8.2*   HEMATOCRIT % 27.5* 25.7* 24.8*   MCV fL 91 91 90   MCH pg 29.2 29.3 29.6   MCHC g/dL 32.0 32.3 33.1   RDW % 13.8 13.6 13.8   PLATELETS AUTO x10*3/uL 82* 69* 72*     CMP:    Results from last 7 days   Lab Units 07/28/25  0602 07/27/25  0612 07/26/25  0556   SODIUM mmol/L 140 140 140   POTASSIUM mmol/L 4.0 3.9 3.9   CHLORIDE mmol/L 110* 110* 109*   CO2 mmol/L 25 26 26   BUN mg/dL 12 9 10   CREATININE mg/dL 0.67 0.55 0.62   GLUCOSE mg/dL 120* 118* 158*   PROTEIN TOTAL g/dL 5.5* 5.3* 5.2*   CALCIUM mg/dL 8.4* 8.3* 8.0*   BILIRUBIN TOTAL mg/dL 1.2 1.5* 1.3*   ALK PHOS U/L 202* 206* 216*   AST U/L 81* 114* 145*   ALT U/L 69* 83* 89*     BMP:    Results from last 7 days   Lab Units 07/28/25  0602 07/27/25  0612 07/26/25  0556   SODIUM mmol/L 140 140 140   POTASSIUM mmol/L 4.0 3.9 3.9   CHLORIDE mmol/L 110* 110* 109*   CO2 mmol/L 25 26 26   BUN mg/dL 12 9 10   CREATININE mg/dL 0.67 0.55 0.62   CALCIUM mg/dL 8.4* 8.3* 8.0*   GLUCOSE mg/dL 120* 118* 158*     Magnesium:  Results from last 7 days   Lab Units 07/28/25  0602 07/27/25  0612 07/26/25  0556   MAGNESIUM mg/dL 1.72 1.69 1.65     Troponin:    Results from last 7 days   Lab Units 07/23/25  1901   TROPHS ng/L 4     BNP:     Lipid Panel:  Results from last 7 days   Lab Units 07/28/25  0602 07/24/25  0551 07/23/25  1901   INR  1.1 1.2* 1.3*   PROTIME seconds 12.5* 12.8* 14.1*        Nutrition             Relevant Results  Results from last 7 days   Lab Units 07/28/25  1211 07/28/25  0914 07/28/25  0607 07/28/25  0602 07/27/25  1937 07/27/25  1618 07/27/25  0620 07/27/25  0612 07/26/25  0639 07/26/25  0556 07/25/25  1130 07/25/25  0631 07/24/25  0628 07/24/25  0551   POCT GLUCOSE mg/dL 116* 124* 117*  --  226* 133*   < >  --    < >  --    <  >  --    < >  --    GLUCOSE mg/dL  --   --   --  120*  --   --   --  118*  --  158*  --  134*  --  103*    < > = values in this interval not displayed.     Lab Results   Component Value Date    HGBA1C 5.3 07/26/2025        Assessment/Plan    BRBPR, likely secondary to hemorrhoids  Compensated cirrhosis with portal venous hypertension  Splenomegaly  Celiac artery stenosis  Iron deficiency anemia  Constipation  - CT A/P without evidence of GI bleed, notes short segment stenosis of the celiac artery ostium concerning for median arcuate ligament syndrome.  Also notes moderate cirrhotic changes with portal vein hypertension, splenomegaly, and diverticulosis without diverticulitis  - EGD 7/2025 showed 3 large grade 3 esophageal varices, banded x 5 with complete eradication  - GI consult, no endoscopic studies needed, okay for discharge  - Continue IV Zosyn  - Follow blood cultures, no growth to date  - Continue Coreg for primary prophylaxis of esophageal varices  - PPI twice daily  - Trend LFTs and INR daily  - Encourage alcohol cessation/abstinence  - Continue iron supplement  - Optimize pain control  - Bowel regimen with MiraLAX and Geraldine-Colace  - RUQ US shows cirrhotic liver, acute portal and hepatic venous thrombosis  - ACS consulted, further recs pending ERCP  - ERCP allowed 1 stone to be removed, sphincterotomy and balloon sweeping  - Consider pain management consult per GI for MALS and celiac stenosis     Thrombocytopenia  HTN  SLE, not in acute flare  - Thrombocytopenia appears stable, will trend with daily labs  - Continue home antihypertensives  - Continue home Plaquenil     T2DM not requiring insulin  - Hold home Rybelsus  - SSI, Accu-Cheks, diabetic diet  - A1c 3/2025 was 6.3%  - Repeat A1c 5.3%     DVTp: Defer  PLAN: Home    Lupe Beltran PA-C    Plan of care was discussed extensively with patient.  Patient verbalized understanding through teach back method.  All question and concerns addressed upon  examination.    Of note, this documentation is completed using the Dragon Dictation system (voice recognition software). There may be spelling and/or grammatical errors that were not corrected prior to final submission.             [1] carvedilol, 6.25 mg, oral, Nightly  ferrous sulfate, 65 mg of elemental iron, oral, Daily  folic acid, 1 mg, oral, Daily  hydroxychloroquine, 200 mg, oral, Daily  insulin lispro, 0-15 Units, subcutaneous, TID AC  lisinopril, 30 mg, oral, Daily  pantoprazole, 40 mg, intravenous, BID  piperacillin-tazobactam, 3.375 g, intravenous, q8h  polyethylene glycol, 17 g, oral, Daily  pregabalin, 150 mg, oral, TID  sennosides-docusate sodium, 1 tablet, oral, BID  sodium chloride, 500 mL, intravenous, Once  [2]    [3] PRN medications: acetaminophen **OR** acetaminophen **OR** acetaminophen, dextrose, dextrose, glucagon, glucagon, loperamide, ondansetron **OR** ondansetron

## 2025-07-28 NOTE — DISCHARGE INSTRUCTIONS
Follow-up with GI  Follow-up with PCP within 1 week of discharge    Thank you for choosing [facility]. It has been a pleasure taking part in your medical care. Please follow up with your primary care provider as instructed. If your symptoms should persist or worsen, please contact your primary care physician, or in the case of an emergency proceed to the nearest Emergency Room for further care. If you have any questions about the care you received, please call [facility] at [phone]. Thank you again!

## 2025-07-28 NOTE — DISCHARGE SUMMARY
Discharge Diagnosis  Choledocholithiasis  Transaminitis  Cirrhosis           Issues Requiring Follow-Up  Follow-up with GI and PCP    Discharge Meds     Medication List      ASK your doctor about these medications     belimumab 10 mg/kg in sodium chloride 0.9% IVPB   carvedilol 6.25 mg tablet; Commonly known as: Coreg; Take 1 tablet (6.25   mg) by mouth once daily at bedtime.   ferrous sulfate 325 (65 Fe) mg EC tablet; Take 1 tablet by mouth once   daily with breakfast. Do not crush, chew, or split.   fluticasone 50 mcg/actuation nasal spray; Commonly known as: Flonase;   Administer 1 spray into each nostril once daily. Shake gently. Before   first use, prime pump. After use, clean tip and replace cap.   folic acid 1 mg tablet; Commonly known as: Folvite; Take 1 tablet (1 mg)   by mouth once daily.   lisinopril 30 mg tablet; Take 1 tablet (30 mg) by mouth once daily.   pantoprazole 40 mg EC tablet; Commonly known as: ProtoNix; Take 1 tablet   (40 mg) by mouth 2 times a day. Do not crush, chew, or split.   PlaqueniL 200 mg tablet; Generic drug: hydroxychloroquine   pregabalin 150 mg capsule; Commonly known as: Lyrica   Rybelsus 7 mg tablet; Generic drug: semaglutide; Take 1 tablet (7 mg) by   mouth once daily.       Test Results Pending At Discharge  Pending Labs       Order Current Status    Blood Culture Preliminary result    Blood Culture Preliminary result    Extra Tubes Preliminary result    Extra Tubes Preliminary result    Extra Tubes Preliminary result    SST TOP Preliminary result    SST TOP Preliminary result    SST TOP Preliminary result            Hospital Course   This is a 65-year-old female with past medical history including T2DM, GERD, HTN, SLE, cirrhosis with esophageal varices and portal venous hypertension s/p EGD, iron deficiency anemia, splenomegaly, and celiac artery stenosis who presented on 7/23 for BRBPR and transaminitis.  CT A/P had findings concerning for median arcuate ligament syndrome  and moderate cirrhosis with portal vein hypertension, splenomegaly, diverticulosis.  LFTs were significantly elevated.  GI consulted, obtained ERCP and removed a small stone and performed sphincterotomy and balloon sweeps.  No stent placement was needed.  GI also recommended starting the patient on Zosyn and obtaining blood cultures, which are pending but have negative growth to date.  She should follow-up with GI in 2 weeks.  Patient should follow-up with her PCP within 1 week of discharge.  She can resume all of her home medications.  With treatment, the patient's symptoms and labs have significantly improved.  LFTs downtrending and hemoglobin stable.  The patient will be discharged home today barring no postprocedural complications.  She is hemodynamically stable at time of discharge.    Plan of care was discussed extensively with patient.  Patient verbalized understanding through teach back method.  All question and concerns addressed upon examination.     Of note, this documentation is completed using the Dragon Dictation system (voice recognition software). There may be spelling and/or grammatical errors that were not corrected prior to final submission.      Pertinent Physical Exam At Time of Discharge  Physical Exam  Constitutional:       Appearance: She is obese.   HENT:      Head: Normocephalic.      Mouth/Throat:      Mouth: Mucous membranes are moist.     Eyes:      Pupils: Pupils are equal, round, and reactive to light.       Cardiovascular:      Rate and Rhythm: Normal rate and regular rhythm.      Heart sounds: Normal heart sounds, S1 normal and S2 normal.   Pulmonary:      Effort: Pulmonary effort is normal.      Breath sounds: Normal breath sounds.   Abdominal:      General: Bowel sounds are normal.      Palpations: Abdomen is soft.      Tenderness: There is abdominal tenderness in the right upper quadrant.     Musculoskeletal:         General: Normal range of motion.      Cervical back: Neck  supple.     Skin:     General: Skin is warm.     Neurological:      Mental Status: She is alert and oriented to person, place, and time.     Psychiatric:         Mood and Affect: Mood normal.         Behavior: Behavior normal.         Outpatient Follow-Up  Future Appointments   Date Time Provider Department Center   9/11/2025 10:00 AM Isela Man MD [id number] [facility]   12/8/2025  8:50 AM Isela Man MD [id number] [facility]   2/3/2026 12:40 PM Teri Guillermo PharmD [id number]         Lupe Beltran PA-C    I spent 35 minutes on discharge. Time calculated includes bedside evaluation, counseling, and outpatient care coordination.

## 2025-07-28 NOTE — ANESTHESIA PREPROCEDURE EVALUATION
Patient: Daylin Tyler    Procedure Information       Date/Time: 07/28/25 1100    Scheduled providers: Jimmy Schulz MD    Procedure: ERCP    Location: [facility] [place]            Relevant Problems   Cardiac   (+) Hypertension associated with type 2 diabetes mellitus      GI   (+) Acute upper GI bleed   (+) GERD (gastroesophageal reflux disease)   (+) GI bleed   (+) Secondary esophageal varices with bleeding (Multi)      Liver   (+) Cirrhosis of liver without ascites (Multi)      Endocrine   (+) Class 2 severe obesity due to excess calories with serious comorbidity and body mass index (BMI) of 37.0 to 37.9 in adult   (+) Type 2 diabetes mellitus with circulatory disorder, without long-term current use of insulin      Hematology   (+) Iron deficiency anemia   (+) Thrombocytopenia      Musculoskeletal   (+) Fibromyalgia      ID   (+) Tinea versicolor      Skin   (+) Tinea versicolor       Clinical information reviewed:    Allergies                NPO Detail:  NPO/Void Status  Date of Last Liquid: 07/27/25  Time of Last Liquid: 2130  Date of Last Solid: 07/27/25  Time of Last Solid: 2000  Last Intake Type: Clear fluids  Time of Last Void: 0630         Physical Exam    Airway  Mallampati: II  TM distance: >3 FB  Neck ROM: full  Mouth opening: 3 or more finger widths     Cardiovascular - normal exam   Dental    Pulmonary - normal exam   Abdominal - normal exam(+) obese             Anesthesia Plan    History of general anesthesia?: yes  History of complications of general anesthesia?: no    ASA 3     general     intravenous induction   Anesthetic plan and risks discussed with patient.    Plan discussed with CRNA.

## 2025-07-28 NOTE — PROGRESS NOTES
"Daylin Tyler is a 65 y.o. female on day 3 of admission presenting with GI bleed.  Noted to have choledocholithiasis on imaging.    Subjective   Patient n.p.o. for ERCP today.  Patient will boomerang to [facility] for procedure and come back to [facility] to be recovered.  Currently, patient denies abdominal pain, nausea, vomiting, diarrhea, or overt GI bleeding.  Afebrile and hemodynamically stable.    Objective   Well-developed, no acute distress, obese, lungs clear, nonlabored breathing, RRR, abdomen soft, obese,  +RUQ tenderness to palpation, no distention, no peripheral edema, awake alert and oriented x 3, clear speech, no asterixis, appropriate mood and behavior .    Last Recorded Vitals  Blood pressure (!) 95/42, pulse 58, temperature 37 °C (98.6 °F), temperature source Temporal, resp. rate 18, height (!) 1.549 m (5' 1\"), weight 79.2 kg (174 lb 9.7 oz), SpO2 98%.  Intake/Output last 3 Shifts:  I/O last 3 completed shifts:  In: 420 (5.3 mL/kg) [P.O.:320; IV Piggyback:100]  Out: - (0 mL/kg)   Weight: 79.2 kg     Relevant Results  Imaging  US right upper quadrant  Result Date: 7/24/2025  While reviewing today's ultrasound images, I have also reviewed both recent CTs, 7 July 2025 and 23 July 2025   On CT from 7 July 2025, coronal series 502, image 63, I have annotated with a Karl[place] a calcified stone that, at that time, was in the cystic duct or gallbladder neck. On CT 7 July 2025, there was no calcification/stone near the ampulla   On CT from 23 July 2025, coronal series 602, image 71, I have annotated with a Karl[place] that same 4 mm stone which by that time had dropped down the CBD all the way to the ampulla, not causing any appreciable significant change in caliber configuration of the upstream biliary tree, but I note the hyperbilirubinemia that developed between the two CTs   Yesterday's CT confirms choledocholithiasis. As is always the case on ultrasound, the distal CBD/ampulla cannot be visualized due to " shadowing bowel gas, but the CT finding is absolutely certain, not requiring MRCP two confirmed but may be obtained at clinician's discretion to assess for additional stones which may not be visible on CT   Redemonstration of cirrhotic liver   Yesterday's CT with arterial phase postcontrast imaging was far more sensitive and specific for any potential hepatocellular carcinoma and there was no arterial-enhancing liver lesion   Yesterday's CT with portal venous phase postcontrast imaging confirmed the splenic vein, main and intrahepatic portal veins are all patent; acute portal venous thrombosis   Yesterday's CT with portal venous phase postcontrast imaging confirmed all three hepatic veins are patent; no acute hepatic venous thrombosis   Yesterday's complete CT abdomen and pelvis confirmed absence of ascites anywhere in the abdomen or pelvis. Still no ascites in the right upper quadrant on today's ultrasound   Tiny sandlike, calcified gallstones confirmed on yesterday's CT do not project as well on today's ultrasound. Gallbladder no longer dilated. The mild gallbladder wall thickening present yesterday and again today is nonspecific in the absence of gallbladder dilation and suspected hepatitis   MACRO: None   Signed by: Mukund Nunez 7/24/2025 10:07 AM Dictation workstation:   [id number]    CT angio abdomen pelvis w and or wo IV IV contrast  Result Date: 7/23/2025  Vascular: *No evidence of upper or lower gastrointestinal hemorrhage from an arterial source. *Short segment stenosis with angulation and poststenotic dilatation centered at the celiac artery ostium.  Findings raise the possibility for median arcuate ligament syndrome in the appropriate clinical setting. Abdomen/pelvis: *Moderate cirrhotic changes with evidence of portal venous hypertension. *Splenomegaly *1.4 cm isoattenuating lesion in the main body of the RIGHT adrenal.  *Extensive sigmoid and colonic diverticulosis without diverticulitis Signed by  "Jackie Oscar MD      Cardiology, Vascular, and Other Imaging  FL fluoro images no charge  Result Date: 7/28/2025  These images are not reportable by radiology and will not be interpreted by  Radiologists.    Endoscopic Retrograde Cholangiopancreatography (ERCP)  Result Date: 7/28/2025  Table formatting from the original result was not included. Impression Small biliary stone removed with a combination of sphincterotomy, sphincteroplasty and balloon sweeps Indocin not administered due to history of cirrhosis Findings The  film appeared normal. The major papilla was native. The major papilla had a normal appearance. The common bile duct was deeply cannulated after 2 attempts using a traction sphincterotome with 260 cm x 0.025\" straight guidewire. Cannulation was not difficult. An initial contrast injection was performed successfully in the common bile duct. The injection extended throughout the entire biliary tree. Ductal flow was adequate. Additional filling defects were observed. The study's image quality was acceptable. Single non-obstructing stone with sludge but without pus was noted in the common bile duct. The stone was 5 mm. Complete large biliary sphincterotomy was performed using a sphincterotome. No bleeding was noted at the procedure site. Multiple sweeps were performed in the common bile duct using a 11.5 mm balloon. Sludge was removed, achieving partial clearance. Performed biliary sphincteroplasty with 1 step of dilation using 40 mm long dilator. For step 1, dilator was expanded to 8 mm for 30 s with moderate resistance. Sweeping was performed in the common bile duct using a 15 mm balloon. Stones were removed, achieving complete clearance. A completion contrast injection was performed successfully in the common bile duct. The injection extended throughout the entire biliary tree. Ductal flow was adequate. No additional filling defects were observed. The study's image quality was acceptable. " Recommendation Watch for complications Resume prior diet  Indication Cirrhosis of liver without ascites, unspecified hepatic cirrhosis type (Multi) Abdominal pain, acute, right upper quadrant Elevated LFTs Choledocholithiasis Post-Op Diagnosis None Staff Staff Role Jimmy Schulz MD Proceduralist Medications See Anesthesia Record. Preprocedure A history and physical has been performed, and patient medication allergies have been reviewed. The patient's tolerance of previous anesthesia has been reviewed. The risks and benefits of the procedure and the sedation options and risks were discussed with the patient. All questions were answered and informed consent obtained. Rectal Indomethacin was not administered. Details of the Procedure The patient underwent general anesthesia, which was administered by an anesthesia professional. The patient's blood pressure, heart rate, level of consciousness, oxygen saturation, respirations, ECG and ETCO2 were monitored throughout the procedure. The scope was introduced through the mouth and advanced to the second part of the duodenum. Insufflated with carbon dioxide. Clinical intention was achieved. The patient experienced no blood loss. The procedure was not difficult. The patient tolerated the procedure well. There were no apparent adverse events. Events Procedure Events Event Event Time ENDO SCOPE IN TIME 7/28/2025 11:35 AM ENDO SCOPE OUT TIME 7/28/2025 11:47 AM Specimens No specimens collected Procedure Location [facility] [address] [facility] [address] [facility] [id number] [phone] Referring Provider Sunshine Greenfield, APRN-CNP Procedure Provider Jimmy Schulz MD     ECG 12 lead  Result Date: 7/28/2025  Normal sinus rhythm Cannot rule out Anterior infarct (cited on or before 07-JUL-2025) Abnormal ECG When compared with ECG of 07-JUL-2025 11:13, (unconfirmed) No significant change was found See ED provider note for full interpretation and clinical  correlation    ENDOSCOPIC REVIEW  EGD: 7/8/2025 with Dr. Elizabeth indicated for epigastric pain upper GI bleed, cirrhosis with EV  Impression  Dilation in the distal esophagus with a medium amount of fluid with abnormal mucosa  Three large grade III varices in the middle third of the esophagus and lower third of the esophagus; there was no stigmata of bleeding but given recent bleeding and size and grade of varices decision was made to band. Placed 5 bands successfully, resulting in complete eradication  Mild and friable portal hypertensive gastropathy in the body of the stomach and antrum; performed cold forceps biopsy  The duodenum appeared normal. Performed random biopsy to rule out celiac disease.     EGD: 4/29/2025 with Dr. Man indicated for JESENIA  Impression  2 cm hiatal hernia  Medium varices in the middle third of the esophagus and lower third of the esophagus  Severe and friable portal hypertensive gastropathy in the body of the stomach and antrum; performed cold forceps biopsy  The duodenum appeared normal. Performed random biopsy to rule out celiac disease.     Colonoscopy: 4/29/2025 with Dr. Man indicated for JESENIA  Impression  Patchy areas of friable mucosa in the ascending colon suspicious for mild portal colopathy  Large hemorrhoids  Repeat colonoscopy in 10 years due April, 2035  Continue iron supplementation  Anemia likely multifactorial secondary to mucosal friability from portal hypertensive gastropathy and colopathy    Lab Results   Component Value Date    WBC 3.9 (L) 07/28/2025    HGB 8.8 (L) 07/28/2025    HCT 27.5 (L) 07/28/2025    MCV 91 07/28/2025    PLT 82 (L) 07/28/2025     Lab Results   Component Value Date    ALT 69 (H) 07/28/2025    AST 81 (H) 07/28/2025    ALKPHOS 202 (H) 07/28/2025    BILITOT 1.2 07/28/2025     Lab Results   Component Value Date    GLUCOSE 120 (H) 07/28/2025    CALCIUM 8.4 (L) 07/28/2025     07/28/2025    K 4.0 07/28/2025    CO2 25 07/28/2025     (H)  07/28/2025    BUN 12 07/28/2025    CREATININE 0.67 07/28/2025     Lab Results   Component Value Date    INR 1.1 07/28/2025    INR 1.2 (H) 07/24/2025    INR 1.3 (H) 07/23/2025    PROTIME 12.5 (H) 07/28/2025    PROTIME 12.8 (H) 07/24/2025    PROTIME 14.1 (H) 07/23/2025     Lab Results   Component Value Date    LIPASE 19 07/23/2025     MELD 3.0: 10 at 7/28/2025  6:02 AM  MELD-Na: 8 at 7/28/2025  6:02 AM  Calculated from:  Serum Creatinine: 0.67 mg/dL (Using min of 1 mg/dL) at 7/28/2025  6:02 AM  Serum Sodium: 140 mmol/L (Using max of 137 mmol/L) at 7/28/2025  6:02 AM  Total Bilirubin: 1.2 mg/dL at 7/28/2025  6:02 AM  Serum Albumin: 3.2 g/dL at 7/28/2025  6:02 AM  INR(ratio): 1.1 at 7/28/2025  6:02 AM  Age at listing (hypothetical): 65 years  Sex: Female at 7/28/2025  6:02 AM    ASSESSMENT/PLAN  IMPRESSION/RECOMMENDATIONS  Patient is a 65-year-old female with history of alcohol versus fatty liver induced cirrhosis c/b esophageal varices -without hepatic encephalopathy or ascites, T2DM, morbid obesity, SLE presents to [facility] with chief complaint of abdominal pain and 1 episode of hematochezia in the setting of constipation.  GI initially consulted for BRBPR, but now noted to have choledocholithiasis on imaging.     Choledocholithiasis noted now on imaging with elevated LFTs, hyperbilirubinemia (T. bili 2.4) and RUQ pain.  HDS, afebrile.  No current cholangitis.  Not on anticoagulation.   GIB-hematochezia x 1 occurrence seems to have resolved.  GIB likely 2/2 hemorrhoids in the setting of constipation.  Normocytic anemia - hgb has remained stable 8.5-8.8 today  Compensated cirrhosis with evidence of portal hypertension, splenomegaly.  No further bleeding.  No hepatic encephalopathy.  No ascites.  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 Schulz indicated for  choledocholithiasis, cirrhosis, RUQ pain, elevated LFTs.  Impression  Small biliary stone removed with a combination of sphincterotomy, sphincteroplasty and balloon sweeps  Indocin not administered due to history of cirrhosis    Monitor for post ERCP complications such as pancreatitis, bleeding, infection, and perforation.  Some melena as expected if patient had sphincterotomy.  Notify GI with any overt GI bleeding or hemoglobin drop <3 g/dL.  Monitor for signs of acute cholangitis, acute pancreatitis, bacterial peritonitis and sepsis.    Monitor for signs of free bowel wall perforation, retroperitoneal duodenal perforation, pancreatic or bile duct perforation.  Recommend CT scanning of the abdomen with any concern for perforation.  Keep patient n.p.o.  - Blood cultures with negative to date.   - Broad-spectrum antibiotic     - Continue Coreg for primary prophylaxis of esophageal varices, if HR remains >60 and BP allows recommend increasing dose to BID   - Repeat EGD 9/11/2025 at 10 A.M. with Dr. Man as already scheduled   - Continue monitoring hemoglobin, transfuse if hemoglobin less than 7  - Continue to monitor for signs of overt GIB  - Continue pantoprazole 40 mg IV/p.o. twice daily  -diet as tolerated: low-salt, soft diet  - Monitor for signs of encephalopathy  - Abstain from alcohol   - Trend LFTs and INR daily   - Avoid constipation   - Recommend high fiber diet  - Recommend MiraLax 1-2 days as daily as needed for constipation- Gas-X PRN (miralax ordered)  - Senna BID ordered  - Continue iron supplement   - Supportive care per medicine team  - Consider pain management consult to evaluate and treat MALS, celiac stenosis   - Follow up with Dr. Man after EGD in September 2025.      I spent 25 minutes in the professional and overall care of this patient.      Ros Menjivar, APRN-CNP

## 2025-07-28 NOTE — ANESTHESIA POSTPROCEDURE EVALUATION
Patient: Daylin Tyler    Procedure Summary       Date: 07/28/25 Room / Location: [facility] [place]    Anesthesia Start: 1119 Anesthesia Stop: 1158    Procedure: ERCP Diagnosis:       Cirrhosis of liver without ascites, unspecified hepatic cirrhosis type (Multi)      Abdominal pain, acute, right upper quadrant      Elevated LFTs      Choledocholithiasis    Scheduled Providers: Jimmy Schulz MD Responsible Provider: Roberto Lopez DO    Anesthesia Type: general ASA Status: 3            Anesthesia Type: general    Vitals Value Taken Time   /55 07/28/25 13:15   Temp 36 °C (96.8 °F) 07/28/25 12:05   Pulse 56 07/28/25 13:16   Resp 17 07/28/25 13:16   SpO2 96 % 07/28/25 13:16   Vitals shown include unfiled device data.    Anesthesia Post Evaluation    Patient location during evaluation: PACU  Patient participation: complete - patient participated  Level of consciousness: awake and alert  Pain management: adequate  Airway patency: patent  Cardiovascular status: acceptable  Respiratory status: acceptable  Hydration status: acceptable  Postoperative Nausea and Vomiting: none        There were no known notable events for this encounter.

## 2025-07-28 NOTE — ANESTHESIA PROCEDURE NOTES
Airway  Date/Time: 7/28/2025 11:28 AM  Reason: elective    Airway not difficult    Staffing  Performed: CRNA   Authorized by: Roberto Lopez DO    Performed by: CHRISTINA Jhaveri-DONAL  Patient location during procedure: OR    Patient Condition  Indications for airway management: anesthesia  Patient position: sniffing  Sedation level: deep     Final Airway Details   Preoxygenated: yes  Final airway type: endotracheal airway  Successful airway: ETT  Cuffed: yes   Successful intubation technique: video laryngoscopy (Loo)  Adjuncts used in placement: intubating stylet  Blade size: #3  ETT size (mm): 7.0  Cormack-Lehane Classification: grade IIa - partial view of glottis  Placement verified by: chest auscultation, capnometry and palpation of cuff   Measured from: lips  ETT to lips (cm): 21  Number of attempts at approach: 1

## 2025-07-29 VITALS
OXYGEN SATURATION: 96 % | SYSTOLIC BLOOD PRESSURE: 111 MMHG | HEIGHT: 61 IN | RESPIRATION RATE: 17 BRPM | HEART RATE: 64 BPM | DIASTOLIC BLOOD PRESSURE: 58 MMHG | WEIGHT: 174.6 LBS | BODY MASS INDEX: 32.97 KG/M2 | TEMPERATURE: 97.5 F

## 2025-07-29 LAB
ALBUMIN SERPL BCP-MCNC: 3.3 G/DL (ref 3.4–5)
ALP SERPL-CCNC: 218 U/L (ref 33–136)
ALT SERPL W P-5'-P-CCNC: 58 U/L (ref 7–45)
ANION GAP SERPL CALC-SCNC: 9 MMOL/L (ref 10–20)
APTT PPP: 34 SECONDS (ref 26–36)
AST SERPL W P-5'-P-CCNC: 54 U/L (ref 9–39)
BACTERIA BLD CULT: NORMAL
BACTERIA BLD CULT: NORMAL
BASOPHILS # BLD AUTO: 0.01 X10*3/UL (ref 0–0.1)
BASOPHILS NFR BLD AUTO: 0.2 %
BILIRUB SERPL-MCNC: 1.3 MG/DL (ref 0–1.2)
BUN SERPL-MCNC: 10 MG/DL (ref 6–23)
CALCIUM SERPL-MCNC: 8.8 MG/DL (ref 8.6–10.3)
CHLORIDE SERPL-SCNC: 108 MMOL/L (ref 98–107)
CO2 SERPL-SCNC: 27 MMOL/L (ref 21–32)
CREAT SERPL-MCNC: 0.54 MG/DL (ref 0.5–1.05)
EGFRCR SERPLBLD CKD-EPI 2021: >90 ML/MIN/1.73M*2
EOSINOPHIL # BLD AUTO: 0.03 X10*3/UL (ref 0–0.7)
EOSINOPHIL NFR BLD AUTO: 0.6 %
ERYTHROCYTE [DISTWIDTH] IN BLOOD BY AUTOMATED COUNT: 13.8 % (ref 11.5–14.5)
GLUCOSE BLD MANUAL STRIP-MCNC: 131 MG/DL (ref 74–99)
GLUCOSE BLD MANUAL STRIP-MCNC: 206 MG/DL (ref 74–99)
GLUCOSE SERPL-MCNC: 130 MG/DL (ref 74–99)
HCT VFR BLD AUTO: 27.3 % (ref 36–46)
HGB BLD-MCNC: 8.8 G/DL (ref 12–16)
HOLD SPECIMEN: NORMAL
IMM GRANULOCYTES # BLD AUTO: 0.01 X10*3/UL (ref 0–0.7)
IMM GRANULOCYTES NFR BLD AUTO: 0.2 % (ref 0–0.9)
INR PPP: 1.2 (ref 0.9–1.1)
LYMPHOCYTES # BLD AUTO: 0.47 X10*3/UL (ref 1.2–4.8)
LYMPHOCYTES NFR BLD AUTO: 10.1 %
MAGNESIUM SERPL-MCNC: 1.76 MG/DL (ref 1.6–2.4)
MCH RBC QN AUTO: 29.3 PG (ref 26–34)
MCHC RBC AUTO-ENTMCNC: 32.2 G/DL (ref 32–36)
MCV RBC AUTO: 91 FL (ref 80–100)
MONOCYTES # BLD AUTO: 0.27 X10*3/UL (ref 0.1–1)
MONOCYTES NFR BLD AUTO: 5.8 %
NEUTROPHILS # BLD AUTO: 3.87 X10*3/UL (ref 1.2–7.7)
NEUTROPHILS NFR BLD AUTO: 83.1 %
NRBC BLD-RTO: 0 /100 WBCS (ref 0–0)
PLATELET # BLD AUTO: 78 X10*3/UL (ref 150–450)
POTASSIUM SERPL-SCNC: 4.1 MMOL/L (ref 3.5–5.3)
PROT SERPL-MCNC: 5.8 G/DL (ref 6.4–8.2)
PROTHROMBIN TIME: 13.1 SECONDS (ref 9.8–12.4)
RBC # BLD AUTO: 3 X10*6/UL (ref 4–5.2)
SODIUM SERPL-SCNC: 140 MMOL/L (ref 136–145)
WBC # BLD AUTO: 4.7 X10*3/UL (ref 4.4–11.3)

## 2025-07-29 PROCEDURE — 82947 ASSAY GLUCOSE BLOOD QUANT: CPT

## 2025-07-29 PROCEDURE — 99231 SBSQ HOSP IP/OBS SF/LOW 25: CPT | Performed by: NURSE PRACTITIONER

## 2025-07-29 PROCEDURE — 2500000001 HC RX 250 WO HCPCS SELF ADMINISTERED DRUGS (ALT 637 FOR MEDICARE OP): Performed by: NURSE PRACTITIONER

## 2025-07-29 PROCEDURE — 85730 THROMBOPLASTIN TIME PARTIAL: CPT

## 2025-07-29 PROCEDURE — 99231 SBSQ HOSP IP/OBS SF/LOW 25: CPT | Performed by: SURGERY

## 2025-07-29 PROCEDURE — 99239 HOSP IP/OBS DSCHRG MGMT >30: CPT

## 2025-07-29 PROCEDURE — 2500000004 HC RX 250 GENERAL PHARMACY W/ HCPCS (ALT 636 FOR OP/ED): Performed by: NURSE PRACTITIONER

## 2025-07-29 PROCEDURE — 85025 COMPLETE CBC W/AUTO DIFF WBC: CPT

## 2025-07-29 PROCEDURE — 36415 COLL VENOUS BLD VENIPUNCTURE: CPT

## 2025-07-29 PROCEDURE — 2500000004 HC RX 250 GENERAL PHARMACY W/ HCPCS (ALT 636 FOR OP/ED)

## 2025-07-29 PROCEDURE — 2500000002 HC RX 250 W HCPCS SELF ADMINISTERED DRUGS (ALT 637 FOR MEDICARE OP, ALT 636 FOR OP/ED)

## 2025-07-29 PROCEDURE — 2500000004 HC RX 250 GENERAL PHARMACY W/ HCPCS (ALT 636 FOR OP/ED): Performed by: HOSPITALIST

## 2025-07-29 PROCEDURE — 80053 COMPREHEN METABOLIC PANEL: CPT

## 2025-07-29 PROCEDURE — 90471 IMMUNIZATION ADMIN: CPT | Performed by: HOSPITALIST

## 2025-07-29 PROCEDURE — 90677 PCV20 VACCINE IM: CPT | Performed by: HOSPITALIST

## 2025-07-29 PROCEDURE — 83735 ASSAY OF MAGNESIUM: CPT

## 2025-07-29 RX ADMIN — INSULIN LISPRO 6 UNITS: 100 INJECTION, SOLUTION INTRAVENOUS; SUBCUTANEOUS at 11:23

## 2025-07-29 RX ADMIN — DOCUSATE SODIUM 50MG AND SENNOSIDES 8.6MG 1 TABLET: 8.6; 5 TABLET, FILM COATED ORAL at 09:13

## 2025-07-29 RX ADMIN — HYDROXYCHLOROQUINE SULFATE 200 MG: 200 TABLET, FILM COATED ORAL at 09:12

## 2025-07-29 RX ADMIN — POLYETHYLENE GLYCOL 3350 17 G: 17 POWDER, FOR SOLUTION ORAL at 09:12

## 2025-07-29 RX ADMIN — PIPERACILLIN SODIUM AND TAZOBACTAM SODIUM 3.38 G: 3; .375 INJECTION, SOLUTION INTRAVENOUS at 05:00

## 2025-07-29 RX ADMIN — FERROUS SULFATE TAB 325 MG (65 MG ELEMENTAL FE) 1 TABLET: 325 (65 FE) TAB at 09:13

## 2025-07-29 RX ADMIN — PANTOPRAZOLE SODIUM 40 MG: 40 INJECTION, POWDER, FOR SOLUTION INTRAVENOUS at 09:11

## 2025-07-29 RX ADMIN — PNEUMOCOCCAL 20-VALENT CONJUGATE VACCINE 0.5 ML
2.2; 2.2; 2.2; 2.2; 2.2; 2.2; 2.2; 2.2; 2.2; 2.2; 2.2; 2.2; 2.2; 2.2; 2.2; 2.2; 4.4; 2.2; 2.2; 2.2 INJECTION, SUSPENSION INTRAMUSCULAR at 12:11

## 2025-07-29 RX ADMIN — FOLIC ACID 1 MG: 1 TABLET ORAL at 09:13

## 2025-07-29 RX ADMIN — LISINOPRIL 30 MG: 20 TABLET ORAL at 09:13

## 2025-07-29 RX ADMIN — PREGABALIN 150 MG: 50 CAPSULE ORAL at 09:13

## 2025-07-29 ASSESSMENT — PAIN SCALES - GENERAL: PAINLEVEL_OUTOF10: 0 - NO PAIN

## 2025-07-29 ASSESSMENT — PAIN DESCRIPTION - DESCRIPTORS: DESCRIPTORS: CRAMPING

## 2025-07-29 ASSESSMENT — PAIN - FUNCTIONAL ASSESSMENT: PAIN_FUNCTIONAL_ASSESSMENT: 0-10

## 2025-07-29 NOTE — CARE PLAN
The clinical goals for the shift include Patient to remain comfortable and free of pain throughout the shift.      Problem: Pain - Adult  Goal: Verbalizes/displays adequate comfort level or baseline comfort level  Outcome: Adequate for Discharge     Problem: Safety - Adult  Goal: Free from fall injury  Outcome: Adequate for Discharge     Problem: Discharge Planning  Goal: Discharge to home or other facility with appropriate resources  Outcome: Adequate for Discharge     Problem: Chronic Conditions and Co-morbidities  Goal: Patient's chronic conditions and co-morbidity symptoms are monitored and maintained or improved  Outcome: Adequate for Discharge     Problem: Nutrition  Goal: Nutrient intake appropriate for maintaining nutritional needs  Outcome: Adequate for Discharge

## 2025-07-29 NOTE — CARE PLAN
The patient's goals for the shift include      The clinical goals for the shift include safety and comfort      Problem: Pain - Adult  Goal: Verbalizes/displays adequate comfort level or baseline comfort level  Outcome: Progressing     Problem: Safety - Adult  Goal: Free from fall injury  Outcome: Progressing     Problem: Nutrition  Goal: Nutrient intake appropriate for maintaining nutritional needs  Outcome: Progressing

## 2025-07-29 NOTE — CONSULTS
General Surgery Provider Note       Daylin Tyler     24   1109/1109-A     8:39 AM   [id number]      History of Present Illness   History provided by: Patient     Subjective:  Patient states ***    Past Medical History:  No date: Allergic  2025: Anemia  2025: Clotting disorder (Multi)  No date: Diabetes mellitus (Multi)  No date: Encounter for examination of eyes and vision without   abnormal findings      Comment:  Encounter for eye exam  No date: GERD (gastroesophageal reflux disease)  No date: Headache  No date: Heart murmur  No date: Hypertension  No date: Systemic lupus erythematosus, unspecified      Comment:  History of systemic lupus erythematosus (SLE)  No date: Visual impairment     Past Surgical History:  No date:  SECTION, CLASSIC      Comment:   section  No date:  SECTION, LOW TRANSVERSE  No date: ENDOMETRIAL ABLATION      Comment:  Ablation  No date: EYE SURGERY      Comment:  Eye surgery  No date: TONSILLECTOMY     Social Connections: Not on file        Current Medications[1]     RX Allergies[2]        Physical Exam     Vitals:    25 1557 25 1957 25 0418 25 0808   BP: 137/63 114/53 94/55 111/58   BP Location:       Patient Position:   Sitting    Pulse: 62 65 63 64   Resp:   17    Temp: 36.4 °C (97.5 °F) 36 °C (96.8 °F) 36.4 °C (97.5 °F) 36.4 °C (97.5 °F)   TempSrc:   Temporal    SpO2: 99% 98% 94% 96%   Weight:       Height:             Physical Exam         Lab and Imaging Review   ***     Assessment     Daylin Tyler  is a 45 y.o. female  ***     Plan     ***  ***  ***  ***        Chacho Torres PA-C  25   8:39 AM                           [1]   Current Facility-Administered Medications   Medication Dose Route Frequency Provider Last Rate Last Admin    acetaminophen (Tylenol) tablet 650 mg  650 mg oral q4h PRN CHRISTINA Soto-CNP   650 mg at 25 0422    Or    acetaminophen (Tylenol) oral liquid 650 mg  650 mg oral q4h PRN  EMMANUEL Soto        Or    acetaminophen (Tylenol) suppository 650 mg  650 mg rectal q4h PRN EMMANUEL Soto        carvedilol (Coreg) tablet 6.25 mg  6.25 mg oral Nightly EMMANUEL Soto   6.25 mg at 07/28/25 2026    dextrose 50 % injection 12.5 g  12.5 g intravenous q15 min PRN Lupe Beltran PA-C        dextrose 50 % injection 25 g  25 g intravenous q15 min PRN Lupe Beltran PA-C        ferrous sulfate 325 mg (65 mg elemental) tablet 1 tablet  65 mg of elemental iron oral Daily EMMANUEL Soto   1 tablet at 07/27/25 0955    folic acid (Folvite) tablet 1 mg  1 mg oral Daily EMMANUEL Soto   1 mg at 07/27/25 0955    glucagon (Glucagen) injection 1 mg  1 mg intramuscular q15 min PRN Lupe Beltran PA-C        glucagon (Glucagen) injection 1 mg  1 mg intramuscular q15 min PRN Lupe Beltran PA-C        hydroxychloroquine (Plaquenil) tablet 200 mg  200 mg oral Daily EMMANUEL Soto   200 mg at 07/27/25 0955    insulin lispro injection 0-15 Units  0-15 Units subcutaneous TID AC Lupe Beltran PA-C   3 Units at 07/27/25 1148    lisinopril tablet 30 mg  30 mg oral Daily EMMANUEL Soto   30 mg at 07/27/25 0955    loperamide (Imodium) capsule 2 mg  2 mg oral 4x daily PRN Lupe Beltran PA-C   2 mg at 07/27/25 1148    ondansetron (Zofran) tablet 4 mg  4 mg oral q8h PRN EMMANUEL Soto        Or    ondansetron (Zofran) injection 4 mg  4 mg intravenous q8h PRN EMMANUEL Soto   4 mg at 07/28/25 1136    pantoprazole (Protonix) injection 40 mg  40 mg intravenous BID Susana Rodriguez MD   40 mg at 07/28/25 2026    piperacillin-tazobactam (Zosyn) 3.375 g in dextrose (iso) IV 50 mL  3.375 g intravenous q8h Lupe Beltran PA-C 0 mL/hr at 07/29/25 0000 3.375 g at 07/29/25 0500    polyethylene glycol (Glycolax, Miralax) packet 17 g  17 g oral Daily CHRISTINA Ochoa-CNP   17 g at 07/26/25 0906    pregabalin (Lyrica) capsule 150 mg   150 mg oral TID EMMANUEL Soto   150 mg at 07/28/25 2025    sennosides-docusate sodium (Grealdine-Colace) 8.6-50 mg per tablet 1 tablet  1 tablet oral BID EMMANUEL Ochoa   1 tablet at 07/28/25 2026    sodium chloride 0.9 % bolus 500 mL  500 mL intravenous Once Lupe Beltran PA-C       [2]   Allergies  Allergen Reactions    Metformin GI Upset     Side effects stopped when medication was discontinued    Sulfamethoxazole Itching and Swelling

## 2025-07-29 NOTE — CARE PLAN
The patient's goals for the shift include      The clinical goals for the shift include Patient to remain comfortable and free of pain throughout the shift.      Problem: Pain - Adult  Goal: Verbalizes/displays adequate comfort level or baseline comfort level  Outcome: Progressing     Problem: Nutrition  Goal: Nutrient intake appropriate for maintaining nutritional needs  Outcome: Progressing

## 2025-07-29 NOTE — PROGRESS NOTES
Department of Internal Medicine  Gastroenterology  Progress note      Subjective  GI is following for cirrhosis, rectal bleeding and choledocholithiasis    Patient doing well post ERCP yesterday.  Tolerating a diet with no significant abdominal pain, no nausea/vomiting.  No post ERCP bleeding.  No fever/chills.  Afebrile.  Hgb remained stable.  No leukocytosis.  Results of ERCP discussed with the patient/family at bedside.  Both verbalized understanding all questions answered.        Current Medication  Current Medications[1]    Past Medical History  Active Ambulatory Problems     Diagnosis Date Noted    Abdominal pain, acute, right upper quadrant 04/07/2023    Age-related nuclear cataract of both eyes 04/07/2023    Alkaline phosphatase elevation 04/07/2023    Allergic reaction 04/07/2023    Angular cheilosis 04/07/2023    Arcus senilis, bilateral 04/07/2023    Costochondritis 04/07/2023    Foot pain 04/07/2023    Foot sprain 04/07/2023    Foreign body of left ear 04/07/2023    GERD (gastroesophageal reflux disease) 04/07/2023    Fibromyalgia 04/07/2023    Headache 04/07/2023    Hypertension associated with type 2 diabetes mellitus 04/07/2023    Posterior subcapsular age-related cataract of left eye 04/07/2023    Type 2 diabetes mellitus with circulatory disorder, without long-term current use of insulin 04/07/2023    SLE (systemic lupus erythematosus) (Multi) 04/07/2023    Sore in mouth 04/07/2023    Thrombocytopenia 04/07/2023    Tinea versicolor 04/07/2023    Transaminitis 04/07/2023    Varicose vein of leg 04/07/2023    Varicose veins of lower extremity 04/07/2023    Class 2 severe obesity due to excess calories with serious comorbidity and body mass index (BMI) of 37.0 to 37.9 in adult 04/07/2023    Knee internal derangement 04/07/2023    Knee pain 04/07/2023    Knee sprain 04/07/2023    Annual physical exam 04/24/2023    Iron deficiency anemia 02/21/2025    Acute upper GI bleed 07/07/2025    Cirrhosis of liver  "without ascites (Multi) 07/14/2025    Secondary esophageal varices with bleeding (Multi) 07/14/2025     Resolved Ambulatory Problems     Diagnosis Date Noted    Vitamin D deficiency 04/07/2023     Past Medical History:   Diagnosis Date    Allergic     Anemia 04/2025    Clotting disorder (Multi) 06/2025    Diabetes mellitus (Multi)     Encounter for examination of eyes and vision without abnormal findings     Heart murmur     Hypertension     Systemic lupus erythematosus, unspecified     Visual impairment        PHYSICAL EXAM  VS: /58   Pulse 64   Temp 36.4 °C (97.5 °F)   Resp 17   Ht (!) 1.549 m (5' 1\")   Wt 79.2 kg (174 lb 9.7 oz)   SpO2 96%   BMI 32.99 kg/m²  Body mass index is 32.99 kg/m².  Physical Exam  No acute distress, alert and oriented x 3,  nonlabored breathing, RRR, abdomen soft, no significant tenderness to palpation, skin warm and dry, appropriate mood and behavior    DATA  Recent blood work and relevant radiology and endoscopic studies were reviewed and discussed with the patient   Results from last 7 days   Lab Units 07/29/25  0646   WBC AUTO x10*3/uL 4.7   RBC AUTO x10*6/uL 3.00*   HEMOGLOBIN g/dL 8.8*   HEMATOCRIT % 27.3*   MCV fL 91   MCHC g/dL 32.2   RDW % 13.8   PLATELETS AUTO x10*3/uL 78*       Results from last 72 hours   Lab Units 07/29/25  0646   SODIUM mmol/L 140   POTASSIUM mmol/L 4.1   CHLORIDE mmol/L 108*   CO2 mmol/L 27   BUN mg/dL 10   CREATININE mg/dL 0.54   CALCIUM mg/dL 8.8   PROTEIN TOTAL g/dL 5.8*   BILIRUBIN TOTAL mg/dL 1.3*   ALK PHOS U/L 218*   AST U/L 54*   ALT U/L 58*       Results from last 72 hours   Lab Units 07/29/25  0646   INR  1.2*                 ENDOSCOPIC REVIEW  EGD: 7/8/2025 with Dr. Elizabeth indicated for epigastric pain upper GI bleed, cirrhosis with EV  Impression  Dilation in the distal esophagus with a medium amount of fluid with abnormal mucosa  Three large grade III varices in the middle third of the esophagus and lower third of the esophagus; " there was no stigmata of bleeding but given recent bleeding and size and grade of varices decision was made to band. Placed 5 bands successfully, resulting in complete eradication  Mild and friable portal hypertensive gastropathy in the body of the stomach and antrum; performed cold forceps biopsy  The duodenum appeared normal. Performed random biopsy to rule out celiac disease.     EGD: 4/29/2025 with Dr. Man indicated for JESENIA  Impression  2 cm hiatal hernia  Medium varices in the middle third of the esophagus and lower third of the esophagus  Severe and friable portal hypertensive gastropathy in the body of the stomach and antrum; performed cold forceps biopsy  The duodenum appeared normal. Performed random biopsy to rule out celiac disease.     Colonoscopy: 4/29/2025 with Dr. Man indicated for JESENIA  Impression  Patchy areas of friable mucosa in the ascending colon suspicious for mild portal colopathy  Large hemorrhoids  Repeat colonoscopy in 10 years due April, 2035  Continue iron supplementation  Anemia likely multifactorial secondary to mucosal friability from portal hypertensive gastropathy and colopathy       IMPRESSION/RECOMMENDATIONS  Patient is a 65-year-old female with history of alcohol versus fatty liver induced cirrhosis c/b esophageal varices -without hepatic encephalopathy or ascites, T2DM, morbid obesity, SLE presents to [facility] with chief complaint of abdominal pain and 1 episode of hematochezia in the setting of constipation.  GI initially consulted for BRBPR, but now noted to have choledocholithiasis on imaging.     Choledocholithiasis noted now on imaging with elevated LFTs, hyperbilirubinemia and RUQ pain.  LFTs overall improving post ERCP.  HDS, afebrile.  No current cholangitis.  Not on anticoagulation.   GIB-hematochezia x 1 occurrence seems to have resolved.  GIB likely 2/2 hemorrhoids in the setting of constipation.  Normocytic anemia - hgb has remained stable   Compensated  cirrhosis with evidence of portal hypertension, splenomegaly.  No further bleeding.  No hepatic encephalopathy.  No ascites.  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 Schulz indicated for choledocholithiasis, cirrhosis, RUQ pain, elevated LFTs.  Impression  Small biliary stone removed with a combination of sphincterotomy, sphincteroplasty and balloon sweeps  Indocin not administered due to history of cirrhosis    Patient doing well post ERCP yesterday.  Tolerating a diet with no significant abdominal pain, no nausea/vomiting.  No post ERCP bleeding.  No fever/chills.  Afebrile.  Hgb remained stable.  No leukocytosis      - Blood cultures -negative   - Broad-spectrum antibiotic    - Continue Coreg for primary prophylaxis of esophageal varices, if HR remains >60 and BP allows recommend increasing dose to BID   - Repeat EGD 9/11/2025 at 10 A.M. with Dr. Man as already scheduled   - Continue monitoring hemoglobin, transfuse if hemoglobin less than 7  - Continue to monitor for signs of overt GIB  - Continue pantoprazole 40 mg IV/p.o. twice daily  -diet as tolerated: low-salt, soft diet  - Monitor for signs of encephalopathy  - Abstain from alcohol   - Trend LFTs and INR daily   - Avoid constipation   - Recommend high fiber diet  - Recommend MiraLax 1-2 days as daily as needed for constipation- Gas-X PRN (miralax ordered)  - Senna BID ordered  - Continue iron supplement   - Supportive care per medicine team  - Consider pain management consult to evaluate and treat MALS, celiac stenosis   - Follow up with Dr. Man after EGD in September 2025.             Plan discussed Dr. Perdomo.  GI will sign off  (Electronically signed byCHRISTINA Hairston-CNP on 7/29/2025 at 1:42 PM)        [1]   Current Facility-Administered Medications:     acetaminophen (Tylenol) tablet 650  mg, 650 mg, oral, q4h PRN, 650 mg at 07/25/25 0422 **OR** acetaminophen (Tylenol) oral liquid 650 mg, 650 mg, oral, q4h PRN **OR** acetaminophen (Tylenol) suppository 650 mg, 650 mg, rectal, q4h PRN, EMMANUEL Soto    carvedilol (Coreg) tablet 6.25 mg, 6.25 mg, oral, Nightly, EMMANUEL Soto, 6.25 mg at 07/28/25 2026    dextrose 50 % injection 12.5 g, 12.5 g, intravenous, q15 min PRN, Lupe Beltran PA-C    dextrose 50 % injection 25 g, 25 g, intravenous, q15 min PRN, Lupe Beltran PA-C    ferrous sulfate 325 mg (65 mg elemental) tablet 1 tablet, 65 mg of elemental iron, oral, Daily, EMMANUEL Soto, 1 tablet at 07/29/25 0913    folic acid (Folvite) tablet 1 mg, 1 mg, oral, Daily, EMMANUEL Soto, 1 mg at 07/29/25 0913    glucagon (Glucagen) injection 1 mg, 1 mg, intramuscular, q15 min PRN, Lupe Beltran PA-C    glucagon (Glucagen) injection 1 mg, 1 mg, intramuscular, q15 min PRN, Lupe Beltran PA-C    hydroxychloroquine (Plaquenil) tablet 200 mg, 200 mg, oral, Daily, EMMANUEL Soto, 200 mg at 07/29/25 0912    insulin lispro injection 0-15 Units, 0-15 Units, subcutaneous, TID AC, Lupe Beltran PA-C, 6 Units at 07/29/25 1123    lisinopril tablet 30 mg, 30 mg, oral, Daily, EMMANUEL Soto, 30 mg at 07/29/25 0913    loperamide (Imodium) capsule 2 mg, 2 mg, oral, 4x daily PRN, Lupe Beltran PA-C, 2 mg at 07/27/25 1148    ondansetron (Zofran) tablet 4 mg, 4 mg, oral, q8h PRN **OR** ondansetron (Zofran) injection 4 mg, 4 mg, intravenous, q8h PRN, Viktoriya Mendoza, CHRISTINA-CNP, 4 mg at 07/28/25 1136    pantoprazole (Protonix) injection 40 mg, 40 mg, intravenous, BID, Susnaa Rodriguez MD, 40 mg at 07/29/25 0911    piperacillin-tazobactam (Zosyn) 3.375 g in dextrose (iso) IV 50 mL, 3.375 g, intravenous, q8h, Lupe Beltran PA-C, Stopped at 07/29/25 0938    polyethylene glycol (Glycolax, Miralax) packet 17 g, 17 g, oral, Daily, CHRISTINA Ochoa-CNP, 17 g  at 07/29/25 0912    pregabalin (Lyrica) capsule 150 mg, 150 mg, oral, TID, Viktoriya Mendoza, APRN-CNP, 150 mg at 07/29/25 0913    sennosides-docusate sodium (Geraldine-Colace) 8.6-50 mg per tablet 1 tablet, 1 tablet, oral, BID, Sunshine GALLOWAY Guevara, APRN-CNP, 1 tablet at 07/29/25 0913    sodium chloride 0.9 % bolus 500 mL, 500 mL, intravenous, Once, Lupe Beltran PA-C    Current Outpatient Medications:     belimumab 10 mg/kg in sodium chloride 0.9% IVPB, Infuse 10 mg/kg into a venous catheter every 28 (twenty-eight) days.  818 mg in Nacl 0.9% 250 ml @ 250 ml/hr., Disp: , Rfl:     carvedilol (Coreg) 6.25 mg tablet, Take 1 tablet (6.25 mg) by mouth once daily at bedtime., Disp: 90 tablet, Rfl: 3    ferrous sulfate 325 (65 Fe) mg EC tablet, Take 1 tablet by mouth once daily with breakfast. Do not crush, chew, or split., Disp: 60 tablet, Rfl: 3    folic acid (Folvite) 1 mg tablet, Take 1 tablet (1 mg) by mouth once daily., Disp: 90 tablet, Rfl: 1    lisinopril 30 mg tablet, Take 1 tablet (30 mg) by mouth once daily., Disp: 90 tablet, Rfl: 1    pantoprazole (ProtoNix) 40 mg EC tablet, Take 1 tablet (40 mg) by mouth 2 times a day. Do not crush, chew, or split., Disp: 180 tablet, Rfl: 3    PlaqueniL 200 mg tablet, Take 1 tablet (200 mg) by mouth twice a day. With food, Disp: , Rfl:     pregabalin (Lyrica) 150 mg capsule, Take 1 capsule (150 mg) by mouth 3 times a day as needed., Disp: , Rfl:     semaglutide (Rybelsus) 7 mg tablet, Take 1 tablet (7 mg) by mouth once daily., Disp: 90 tablet, Rfl: 3    fluticasone (Flonase) 50 mcg/actuation nasal spray, Administer 1 spray into each nostril once daily. Shake gently. Before first use, prime pump. After use, clean tip and replace cap., Disp: 16 g, Rfl: 5

## 2025-07-29 NOTE — NURSING NOTE
This nurse introduced self and role to patient and spouse, prepared and provided AVS, sat with patient, started and complete discharge education, pt verbalized understanding, without further questions or concerns, agreeable and comfortable with discharge at this time, piv removed with catheter intact, no tele, pt states she has all of her belongings, transport requested, discharge complete.

## 2025-07-29 NOTE — PROGRESS NOTES
Pt to be discharged today. Dc order is in. Met with pt to ensure she had no questions or concerns for dc and she verified that she did not. Pt will dc home today no needs.

## 2025-07-29 NOTE — PROGRESS NOTES
"Daylin Tyler is a 65 y.o. female on day 4 of admission presenting with GI bleed.    Subjective   No acute events overnight, tolerating diet, denies any pain, labs normalizing       Objective     Physical Exam  Gen: NAD, Aax3  Abd; Soft, ND, NTTP  Last Recorded Vitals  Blood pressure 111/58, pulse 64, temperature 36.4 °C (97.5 °F), resp. rate 17, height (!) 1.549 m (5' 1\"), weight 79.2 kg (174 lb 9.7 oz), SpO2 96%.  Intake/Output last 3 Shifts:  No intake/output data recorded.    Relevant Results             Component      Latest Ref Rng 7/28/2025   WBC      4.4 - 11.3 x10*3/uL 3.9 (L)    nRBC      0.0 - 0.0 /100 WBCs 0.0    RBC      4.00 - 5.20 x10*6/uL 3.01 (L)    HEMOGLOBIN      12.0 - 16.0 g/dL 8.8 (L)    HEMATOCRIT      36.0 - 46.0 % 27.5 (L)    MCV      80 - 100 fL 91    MCH      26.0 - 34.0 pg 29.2    MCHC      32.0 - 36.0 g/dL 32.0    RED CELL DISTRIBUTION WIDTH      11.5 - 14.5 % 13.8    Platelets      150 - 450 x10*3/uL 82 (L)    Neutrophils %      40.0 - 80.0 % 62.1    Immature Granulocytes %, Automated      0.0 - 0.9 % 0.8    Lymphocytes %      13.0 - 44.0 % 21.4    Monocytes %      2.0 - 10.0 % 8.1    Eosinophils %      0.0 - 6.0 % 6.6    Basophils %      0.0 - 2.0 % 1.0    Neutrophils Absolute      1.20 - 7.70 x10*3/uL 2.44    Immature Granulocytes Absolute, Automated      0.00 - 0.70 x10*3/uL 0.03    Lymphocytes Absolute      1.20 - 4.80 x10*3/uL 0.84 (L)    Monocytes Absolute      0.10 - 1.00 x10*3/uL 0.32    Eosinophils Absolute      0.00 - 0.70 x10*3/uL 0.26    Basophils Absolute      0.00 - 0.10 x10*3/uL 0.04    GLUCOSE      74 - 99 mg/dL 120 (H)    SODIUM      136 - 145 mmol/L 140    POTASSIUM      3.5 - 5.3 mmol/L 4.0    CHLORIDE      98 - 107 mmol/L 110 (H)    Bicarbonate      21 - 32 mmol/L 25    Anion Gap      10 - 20 mmol/L 9 (L)    Blood Urea Nitrogen      6 - 23 mg/dL 12    Creatinine      0.50 - 1.05 mg/dL 0.67    EGFR      >60 mL/min/1.73m*2 >90    Calcium      8.6 - 10.3 mg/dL 8.4 (L)  "   Albumin      3.4 - 5.0 g/dL 3.2 (L)    Alkaline Phosphatase      33 - 136 U/L 202 (H)    Total Protein      6.4 - 8.2 g/dL 5.5 (L)    AST      9 - 39 U/L 81 (H)    Bilirubin Total      0.0 - 1.2 mg/dL 1.2    ALT      7 - 45 U/L 69 (H)    Protime      9.8 - 12.4 seconds 12.5 (H)    INR      0.9 - 1.1  1.1    aPTT      26 - 36 seconds 33    MAGNESIUM      1.60 - 2.40 mg/dL 1.72    POCT Glucose      74 - 99 mg/dL 339 (H)    POCT Glucose       116 (H)    POCT Glucose       124 (H)    POCT Glucose       117 (H)       Legend:  (L) Low  (H) High      ERCP - Impression  Small biliary stone removed with a combination of sphincterotomy, sphincteroplasty and balloon sweeps         Assessment & Plan  GI bleed    Hypertension associated with type 2 diabetes mellitus    SLE (systemic lupus erythematosus) (Multi)    Transaminitis    Iron deficiency anemia    Acute upper GI bleed    Cirrhosis of liver without ascites (Multi)    Epigastric abdominal pain    65-year-old female with cirrhosis who presented with choledocholithiasis now postprocedure day 1 from ERCP.  Spoke with the patient in detail.  Stated that normally and the patient like herself I would recommend having a cholecystectomy during this admission, however given her liver disease I believe this would be very high risk.  I therefore recommended that she be discharged and follow-up as an outpatient to my clinic.  If we discussed that she has a persistent need for a cholecystectomy, I would refer her to hepatobiliary surgeon or transplant surgeon at a higher level facility to undergo a cholecystectomy.  -Ok to DC  -FU in office in 2 weeks  -Please call sooner with any issues        I spent 38 minutes in the professional and overall care of this patient.      Jamshid Cotton MD  07/29/25  12:41 PM

## 2025-07-30 ENCOUNTER — PATIENT OUTREACH (OUTPATIENT)
Dept: PRIMARY CARE | Facility: CLINIC | Age: 65
End: 2025-07-30
Payer: COMMERCIAL

## 2025-07-30 LAB
ATRIAL RATE: 68 BPM
HOLD SPECIMEN: NORMAL
P AXIS: 17 DEGREES
P OFFSET: 179 MS
P ONSET: 136 MS
PR INTERVAL: 162 MS
Q ONSET: 217 MS
QRS COUNT: 11 BEATS
QRS DURATION: 84 MS
QT INTERVAL: 428 MS
QTC CALCULATION(BAZETT): 455 MS
QTC FREDERICIA: 446 MS
R AXIS: 12 DEGREES
T AXIS: 13 DEGREES
T OFFSET: 431 MS
VENTRICULAR RATE: 68 BPM

## 2025-07-30 NOTE — PROGRESS NOTES
Discharge Facility:  Discharge Diagnosis:  Epigastric abdominal pain  Cirrhosis of liver without ascites, unspecified hepatic cirrhosis type   Lesion of adrenal gland   History of esophageal varices with bleeding  Elevated transaminase level  Abdominal pain, acute, right upper quadrant  Elevated LFTs  Choledocholithiasis    Admission Date:7/23/2025  Discharge Date: 7/29/2025    PCP Appointment Date:8/14/2025, will keep  Specialist Appointment Date: 9/11/2025 /[facility] Encounter and Summary Linked: Yes  ED to Hosp-Admission (Discharged) with Susana Rodriguez MD; Sawyer Feliciano MD (07/23/2025)   See discharge assessment below for further details  Wrap Up  Wrap Up Additional Comments: -- (Daylin states she is feeling much better, no changes were made in medication. She will fu with GI and PCP. She will contact provider if any concerns.) (7/30/2025  1:34 PM)    Engagement  Call Start Time: 1330 (7/30/2025  1:34 PM)    Medications  Medications reviewed with patient/caregiver?: Yes (No medication change) (7/30/2025  1:34 PM)  Is the patient having any side effects they believe may be caused by any medication additions or changes?: No (7/30/2025  1:34 PM)  Does the patient have all medications ordered at discharge?: Not applicable (7/30/2025  1:34 PM)  Care Management Interventions: No intervention needed (7/30/2025  1:34 PM)  Prescription Comments: -- (na) (7/30/2025  1:34 PM)  Is the patient taking all medications as directed (includes completed medication regime)?: Yes (7/30/2025  1:34 PM)  Medication Comments: -- (na) (7/30/2025  1:34 PM)    Appointments  Does the patient have a primary care provider?: Yes (7/30/2025  1:34 PM)  Care Management Interventions: Verified appointment date/time/provider (8/14/2025, declined sooner) (7/30/2025  1:34 PM)  Has the patient kept scheduled appointments due by today?: Yes (7/30/2025  1:34 PM)    Self Management  What is the home health agency?: -- (na) (7/30/2025   1:34 PM)  What Durable Medical Equipment (DME) was ordered?: -- (na) (7/30/2025  1:34 PM)    Patient Teaching  Does the patient have access to their discharge instructions?: Yes (7/30/2025  1:34 PM)  Care Management Interventions: Reviewed instructions with patient (7/30/2025  1:34 PM)  What is the patient's perception of their health status since discharge?: Improving (7/30/2025  1:34 PM)  Is the patient/caregiver able to teach back the hierarchy of who to call/visit for symptoms/problems? PCP, Specialist, Home Health nurse, Urgent Care, ED, 911: Yes (7/30/2025  1:34 PM)

## 2025-08-04 LAB
ATRIAL RATE: 61 BPM
P AXIS: 21 DEGREES
P OFFSET: 173 MS
P ONSET: 131 MS
PR INTERVAL: 170 MS
Q ONSET: 216 MS
QRS COUNT: 10 BEATS
QRS DURATION: 84 MS
QT INTERVAL: 426 MS
QTC CALCULATION(BAZETT): 428 MS
QTC FREDERICIA: 428 MS
R AXIS: 19 DEGREES
T AXIS: 28 DEGREES
T OFFSET: 429 MS
VENTRICULAR RATE: 61 BPM

## 2025-08-13 ENCOUNTER — PATIENT OUTREACH (OUTPATIENT)
Dept: PRIMARY CARE | Facility: CLINIC | Age: 65
End: 2025-08-13
Payer: COMMERCIAL

## 2025-08-14 ENCOUNTER — APPOINTMENT (OUTPATIENT)
Dept: PRIMARY CARE | Facility: CLINIC | Age: 65
End: 2025-08-14
Payer: COMMERCIAL

## 2025-08-21 ENCOUNTER — APPOINTMENT (OUTPATIENT)
Dept: PRIMARY CARE | Facility: CLINIC | Age: 65
End: 2025-08-21
Payer: COMMERCIAL

## 2025-08-21 VITALS
OXYGEN SATURATION: 98 % | TEMPERATURE: 98 F | HEART RATE: 64 BPM | SYSTOLIC BLOOD PRESSURE: 118 MMHG | BODY MASS INDEX: 33.79 KG/M2 | WEIGHT: 179 LBS | HEIGHT: 61 IN | DIASTOLIC BLOOD PRESSURE: 80 MMHG | RESPIRATION RATE: 16 BRPM

## 2025-08-21 DIAGNOSIS — I87.2 VENOUS INSUFFICIENCY OF BOTH LOWER EXTREMITIES: Primary | ICD-10-CM

## 2025-08-21 PROCEDURE — 3008F BODY MASS INDEX DOCD: CPT | Performed by: PHYSICIAN ASSISTANT

## 2025-08-21 PROCEDURE — 1159F MED LIST DOCD IN RCRD: CPT | Performed by: PHYSICIAN ASSISTANT

## 2025-08-21 PROCEDURE — 3074F SYST BP LT 130 MM HG: CPT | Performed by: PHYSICIAN ASSISTANT

## 2025-08-21 PROCEDURE — 3079F DIAST BP 80-89 MM HG: CPT | Performed by: PHYSICIAN ASSISTANT

## 2025-08-21 PROCEDURE — 4010F ACE/ARB THERAPY RXD/TAKEN: CPT | Performed by: PHYSICIAN ASSISTANT

## 2025-08-21 PROCEDURE — 1111F DSCHRG MED/CURRENT MED MERGE: CPT | Performed by: PHYSICIAN ASSISTANT

## 2025-08-21 PROCEDURE — 3044F HG A1C LEVEL LT 7.0%: CPT | Performed by: PHYSICIAN ASSISTANT

## 2025-08-21 PROCEDURE — 99214 OFFICE O/P EST MOD 30 MIN: CPT | Performed by: PHYSICIAN ASSISTANT

## 2025-08-21 RX ORDER — HYDROCHLOROTHIAZIDE 25 MG/1
25 TABLET ORAL DAILY PRN
Qty: 30 TABLET | Refills: 1 | Status: SHIPPED | OUTPATIENT
Start: 2025-08-21 | End: 2025-10-20

## 2025-09-02 ENCOUNTER — CLINICAL SUPPORT (OUTPATIENT)
Dept: PREADMISSION TESTING | Facility: HOSPITAL | Age: 65
End: 2025-09-02
Payer: COMMERCIAL

## 2025-09-03 VITALS — HEIGHT: 61 IN | BODY MASS INDEX: 32.67 KG/M2 | WEIGHT: 173.06 LBS

## 2025-10-01 ENCOUNTER — APPOINTMENT (OUTPATIENT)
Dept: GASTROENTEROLOGY | Facility: CLINIC | Age: 65
End: 2025-10-01
Payer: COMMERCIAL

## 2025-12-08 ENCOUNTER — APPOINTMENT (OUTPATIENT)
Dept: GASTROENTEROLOGY | Facility: CLINIC | Age: 65
End: 2025-12-08
Payer: COMMERCIAL

## 2026-02-03 ENCOUNTER — APPOINTMENT (OUTPATIENT)
Dept: PHARMACY | Facility: HOSPITAL | Age: 66
End: 2026-02-03
Payer: COMMERCIAL

## 2026-02-16 ENCOUNTER — APPOINTMENT (OUTPATIENT)
Dept: PRIMARY CARE | Facility: CLINIC | Age: 66
End: 2026-02-16
Payer: COMMERCIAL